# Patient Record
Sex: FEMALE | Race: BLACK OR AFRICAN AMERICAN | NOT HISPANIC OR LATINO | Employment: FULL TIME | URBAN - METROPOLITAN AREA
[De-identification: names, ages, dates, MRNs, and addresses within clinical notes are randomized per-mention and may not be internally consistent; named-entity substitution may affect disease eponyms.]

---

## 2017-01-25 ENCOUNTER — CONVERSION ENCOUNTER (OUTPATIENT)
Dept: MAMMOGRAPHY | Facility: CLINIC | Age: 62
End: 2017-01-25

## 2018-01-26 ENCOUNTER — CONVERSION ENCOUNTER (OUTPATIENT)
Dept: MAMMOGRAPHY | Facility: CLINIC | Age: 63
End: 2018-01-26

## 2018-08-22 ENCOUNTER — TRANSCRIBE ORDERS (OUTPATIENT)
Dept: ADMINISTRATIVE | Facility: HOSPITAL | Age: 63
End: 2018-08-22

## 2018-08-22 DIAGNOSIS — R06.02 SOB (SHORTNESS OF BREATH): Primary | ICD-10-CM

## 2018-08-22 DIAGNOSIS — I50.9 CONGESTIVE HEART FAILURE, UNSPECIFIED HF CHRONICITY, UNSPECIFIED HEART FAILURE TYPE (HCC): ICD-10-CM

## 2018-09-12 ENCOUNTER — APPOINTMENT (OUTPATIENT)
Dept: LAB | Facility: HOSPITAL | Age: 63
End: 2018-09-12

## 2018-09-12 ENCOUNTER — TRANSCRIBE ORDERS (OUTPATIENT)
Dept: ADMINISTRATIVE | Facility: HOSPITAL | Age: 63
End: 2018-09-12

## 2018-09-12 DIAGNOSIS — Z00.8 HEALTH EXAMINATION IN POPULATION SURVEY: Primary | ICD-10-CM

## 2018-09-12 DIAGNOSIS — Z00.8 HEALTH EXAMINATION IN POPULATION SURVEY: ICD-10-CM

## 2018-09-12 LAB
CHOLEST SERPL-MCNC: 175 MG/DL
EST. AVERAGE GLUCOSE BLD GHB EST-MCNC: 134 MG/DL
HBA1C MFR BLD: 6.3 % (ref 4.2–6.3)
HDLC SERPL-MCNC: 46 MG/DL (ref 40–59)
LDLC SERPL CALC-MCNC: 109 MG/DL
NONHDLC SERPL-MCNC: 129 MG/DL
TRIGL SERPL-MCNC: 102 MG/DL

## 2018-09-12 PROCEDURE — 83036 HEMOGLOBIN GLYCOSYLATED A1C: CPT | Performed by: PREVENTIVE MEDICINE

## 2018-09-12 PROCEDURE — 36415 COLL VENOUS BLD VENIPUNCTURE: CPT | Performed by: PREVENTIVE MEDICINE

## 2018-09-12 PROCEDURE — 80061 LIPID PANEL: CPT

## 2018-10-18 ENCOUNTER — OFFICE VISIT (OUTPATIENT)
Dept: FAMILY MEDICINE CLINIC | Facility: CLINIC | Age: 63
End: 2018-10-18
Payer: COMMERCIAL

## 2018-10-18 VITALS
OXYGEN SATURATION: 95 % | HEART RATE: 65 BPM | HEIGHT: 62 IN | TEMPERATURE: 97.3 F | WEIGHT: 222 LBS | BODY MASS INDEX: 40.85 KG/M2 | RESPIRATION RATE: 16 BRPM | DIASTOLIC BLOOD PRESSURE: 82 MMHG | SYSTOLIC BLOOD PRESSURE: 132 MMHG

## 2018-10-18 DIAGNOSIS — E78.5 HYPERLIPIDEMIA, UNSPECIFIED HYPERLIPIDEMIA TYPE: ICD-10-CM

## 2018-10-18 DIAGNOSIS — E66.01 CLASS 3 SEVERE OBESITY DUE TO EXCESS CALORIES WITH SERIOUS COMORBIDITY AND BODY MASS INDEX (BMI) OF 40.0 TO 44.9 IN ADULT (HCC): ICD-10-CM

## 2018-10-18 DIAGNOSIS — R73.03 PREDIABETES: ICD-10-CM

## 2018-10-18 DIAGNOSIS — I10 HYPERTENSION, UNSPECIFIED TYPE: ICD-10-CM

## 2018-10-18 DIAGNOSIS — J02.8 SORE THROAT (VIRAL): Primary | ICD-10-CM

## 2018-10-18 DIAGNOSIS — Z87.898 HISTORY OF PREDIABETES: ICD-10-CM

## 2018-10-18 DIAGNOSIS — B97.89 SORE THROAT (VIRAL): Primary | ICD-10-CM

## 2018-10-18 PROBLEM — E11.9 TYPE 2 DIABETES MELLITUS WITHOUT COMPLICATION, WITHOUT LONG-TERM CURRENT USE OF INSULIN (HCC): Status: ACTIVE | Noted: 2018-10-18

## 2018-10-18 PROCEDURE — 99204 OFFICE O/P NEW MOD 45 MIN: CPT | Performed by: NURSE PRACTITIONER

## 2018-10-18 RX ORDER — HYDROCHLOROTHIAZIDE 25 MG/1
TABLET ORAL
COMMUNITY
Start: 2017-05-04 | End: 2019-03-20 | Stop reason: SDUPTHER

## 2018-10-18 RX ORDER — OLMESARTAN MEDOXOMIL 40 MG/1
TABLET ORAL
COMMUNITY
Start: 2018-09-12 | End: 2019-03-20 | Stop reason: SDUPTHER

## 2018-10-18 RX ORDER — PANTOPRAZOLE SODIUM 40 MG/1
TABLET, DELAYED RELEASE ORAL
COMMUNITY
Start: 2018-07-17 | End: 2019-03-22 | Stop reason: SDUPTHER

## 2018-10-18 RX ORDER — ASPIRIN 81 MG
TABLET, DELAYED RELEASE (ENTERIC COATED) ORAL
COMMUNITY
Start: 2018-09-12 | End: 2019-06-20 | Stop reason: ALTCHOICE

## 2018-10-18 RX ORDER — HYDROCHLOROTHIAZIDE 25 MG/1
TABLET ORAL
COMMUNITY
Start: 2018-09-12 | End: 2018-10-18 | Stop reason: SDUPTHER

## 2018-10-18 RX ORDER — LEVOTHYROXINE SODIUM 0.05 MG/1
TABLET ORAL EVERY 24 HOURS
COMMUNITY
Start: 2018-05-16 | End: 2018-11-15 | Stop reason: ALTCHOICE

## 2018-10-18 RX ORDER — LANCETS
EACH MISCELLANEOUS
Qty: 100 EACH | Refills: 3 | Status: SHIPPED | OUTPATIENT
Start: 2018-10-18 | End: 2020-05-06 | Stop reason: SDUPTHER

## 2018-10-18 NOTE — ASSESSMENT & PLAN NOTE
Lab Results   Component Value Date    HGBA1C 6 3 09/12/2018       No results for input(s): POCGLU in the last 72 hours  Blood Sugar Average: Last 72 hrs:      I am ordering hgb A1c for pt to check A1c level  Does not take metformin because it makes her sick, has been off of it for a few months now  Will discuss this with Dr Soo Vick

## 2018-10-18 NOTE — PROGRESS NOTES
Assessment/Plan:    Sore throat (viral)  Recommended to continue with conservative measures as well as strict handwashing  Ibuprofen or acetaminophen is recommended for analgesia  Saltwater gargle, warm liquids, and rest may be helpful in relieving symptoms  At this time she does not require antibiotics unless new symptoms arise or worsen  Hypertension  Pt today controlled with hctz and benicar  I am not making any changes  Advised patient to continue on current medication regimen  Explained to patient that therapeutic measure is lifelong lifestyle modification including:  Sodium reduction (<2 g/day)  Dietary Approaches to Stop Hypertension (DASH) diet (3 servings of fruit and vegetables daily, whole grains, low sodium, low-fat proteins)   Weight loss to BMI under 30 kg/m^2  Increased physical activity: 3 to 5 times/week of daily aerobic exercise for 30- to 50-minute sessions as tolerated   Limited alcohol consumption less than 5 drinks for week  Prediabetes  Lab Results   Component Value Date    HGBA1C 6 3 09/12/2018       No results for input(s): POCGLU in the last 72 hours  Blood Sugar Average: Last 72 hrs:      I am ordering hgb A1c for pt to check A1c level  Does not take metformin because it makes her sick, has been off of it for a few months now  Will discuss this with Dr Peng Tavera  Diagnoses and all orders for this visit:    Sore throat (viral)    Hyperlipidemia, unspecified hyperlipidemia type  -     Lipid panel; Future  -     TSH, 3rd generation with Free T4 reflex; Future    Prediabetes    Hypertension, unspecified type    History of prediabetes  -     Comprehensive metabolic panel; Future  -     Hemoglobin A1C; Future  -     glucose blood test strip; Checks blood glucose 3 times a day  -     Lancets (ONETOUCH ULTRASOFT) lancets;  Check blood glucose 3 times a day    Class 3 severe obesity due to excess calories with serious comorbidity and body mass index (BMI) of 40 0 to 44 9 in adult ([facility] )  -     CBC and differential; Future    Other orders  -     VENTOLIN  (90 Base) MCG/ACT inhaler;   -     ASPIRIN LOW DOSE 81 MG EC tablet;   -     hydrochlorothiazide (HYDRODIURIL) 25 mg tablet; take 1 tablet by oral route  every morning  -     Discontinue: hydrochlorothiazide (HYDRODIURIL) 25 mg tablet;   -     hydrocortisone 2 5 % cream;   -     pantoprazole (PROTONIX) 40 mg tablet;   -     levothyroxine (SYNTHROID) 50 mcg tablet; every 24 hours  -     olmesartan (BENICAR) 40 mg tablet;           Subjective:      Patient ID: Pranav Phan is a 59 y o  female  Cc  Per pt  Sore throat since yesterday and need refills her medication  59year old female patient here to establish care  Was under care of Sergio and now starting here  Last bloodwork was this year in the beginning  Pt states she is prediabetic and has hypothyroid  Jan of 2018 had a physical  Mammogram done  Needs to go for colonscopy in 2020, due to a polyp every 5 years  Patient has seasonal allergies  Feels sore throat started 1 day before yesterday  Pain scale 3/10  Denies fever  Has been using salt water gargles and lozenges and she feels better  Some nasal congestion  Does not use flonase as she does not like it  Mild cough last night but she feels good  Using inhaler for her wheeze  The following portions of the patient's history were reviewed and updated as appropriate: allergies, current medications, past family history, past medical history, past social history, past surgical history and problem list     Review of Systems   Constitutional: Negative  HENT: Positive for ear pain and sore throat  Negative for sinus pain  Eyes: Negative  Respiratory: Negative  Negative for shortness of breath  Cardiovascular: Negative  Negative for chest pain, palpitations and leg swelling  Gastrointestinal: Negative  Endocrine: Negative  Negative for polydipsia and polyuria  Genitourinary: Negative  Musculoskeletal: Negative  Skin: Negative  Psychiatric/Behavioral: Negative  Objective:      /82 (BP Location: Left arm, Patient Position: Sitting, Cuff Size: Adult)   Pulse 65   Temp (!) 97 3 °F (36 3 °C) (Oral)   Resp 16   Ht 5' 2" (1 575 m)   Wt 101 kg (222 lb)   SpO2 95%   BMI 40 60 kg/m²          Physical Exam   Constitutional: She is oriented to person, place, and time  She appears well-developed and well-nourished  HENT:   Head: Normocephalic and atraumatic  Right Ear: External ear and ear canal normal  No tenderness  A middle ear effusion is present  Left Ear: External ear and ear canal normal  No tenderness  A middle ear effusion is present  Nose: Rhinorrhea present  Right sinus exhibits no maxillary sinus tenderness and no frontal sinus tenderness  Left sinus exhibits no maxillary sinus tenderness and no frontal sinus tenderness  Mouth/Throat: Uvula is midline, oropharynx is clear and moist and mucous membranes are normal    Cardiovascular: Normal rate, regular rhythm and normal heart sounds  Pulmonary/Chest: Effort normal and breath sounds normal    Musculoskeletal: Normal range of motion  Neurological: She is alert and oriented to person, place, and time  She has normal reflexes  Skin: Skin is warm and dry  Nursing note and vitals reviewed

## 2018-10-18 NOTE — ASSESSMENT & PLAN NOTE
Recommended to continue with conservative measures as well as strict handwashing  Ibuprofen or acetaminophen is recommended for analgesia  Saltwater gargle, warm liquids, and rest may be helpful in relieving symptoms  At this time she does not require antibiotics unless new symptoms arise or worsen

## 2018-10-18 NOTE — PATIENT INSTRUCTIONS
Hypertension   WHAT YOU NEED TO KNOW:   What is hypertension? Hypertension is high blood pressure (BP)  Your BP is the force of your blood moving against the walls of your arteries  Normal BP is less than 120/80  Prehypertension is between 120/80 and 139/89  Hypertension is 140/90 or higher  Hypertension causes your BP to get so high that your heart has to work much harder than normal  This can damage your heart  You can control hypertension with a healthy lifestyle or medicines  A controlled blood pressure helps protect your organs, such as your heart, lungs, brain, and kidneys  What causes hypertension? The cause of hypertension may not be known  This type of hypertension is called essential or primary hypertension  Hypertension can sometimes be caused by other medical conditions, such as kidney disease  This type of hypertension is called secondary hypertension  What increases my risk for hypertension? · Age older than 54 years (men)    · Age older than 72 years (women)    · A family history of hypertension or heart disease     · Obesity or lack of exercise     · Too many high-sodium foods    · Stress     · Use of tobacco, alcohol, or illegal drugs     · A medical condition, such as diabetes, kidney disease, thyroid disease, or adrenal gland disorder     · Certain medicines, such as steroids or birth control pills  What are the signs and symptoms of hypertension? You may have no signs or symptoms, or you may have any of the following:  · Headache     · Blurred vision     · Chest pain     · Dizziness or weakness     · Trouble breathing    · Nosebleeds  How is hypertension diagnosed? Your healthcare provider will ask about your symptoms and the medicines you take  He or she will also ask if you have a family history of high blood pressure and about any health conditions you have  He or she will also check your blood pressure and weight and examine your heart, lungs, and eyes   You may need any of the following tests:  · Blood tests  may help healthcare providers find the cause of your hypertension  Blood tests can also help find other health problems caused by hypertension  · Urine tests  will be done to check your kidney function  Kidney problems can increase your risk for hypertension  How is hypertension treated? Your healthcare provider will recommend lifestyle changes to lower your BP  You may also need the following medicines:  · Medicine  may be used to help lower your BP  You may need more than one type of medicine  Take the medicine exactly as directed  · Diuretics  help decrease extra fluid that collects in your body  This will help lower your BP  You may urinate more often while you take this medicine  · Cholesterol medicine  helps lower your cholesterol level  A low cholesterol level helps prevent heart disease and makes it easier to control your blood pressure  What can I do to manage hypertension? Talk with your healthcare provider about these and other ways to manage hypertension:  · Check your BP at home  Sit and rest for 5 minutes before you take your BP  Extend your arm and support it on a flat surface  Your arm should be at the same level as your heart  Follow the directions that came with your BP monitor  If possible, take at least 2 BP readings each time  Take your BP at least twice a day at the same times each day, such as morning and evening  Keep a record of your BP readings and bring it to your follow-up visits  Ask your healthcare provider what your BP should be  · Limit sodium (salt) as directed  Too much sodium can affect your fluid balance  Check labels to find low-sodium or no-salt-added foods  Some low-sodium foods use potassium salts for flavor  Too much potassium can also cause health problems  Your healthcare provider will tell you how much sodium and potassium are safe for you to have in a day   He or she may recommend that you limit sodium to 2,300 mg a day            · Follow the meal plan recommended by your healthcare provider  A dietitian or your provider can give you more information on low-sodium plans or the DASH (Dietary Approaches to Stop Hypertension) eating plan  The DASH plan is low in sodium, unhealthy fats, and total fat  It is high in potassium, calcium, and fiber  · Exercise to maintain a healthy weight  Exercise at least 30 minutes per day, on most days of the week  This will help decrease your blood pressure  Ask your healthcare provider about the best exercise plan for you  · Decrease stress  This may help lower your BP  Learn ways to relax, such as deep breathing or listening to music  · Limit alcohol  Women should limit alcohol to 1 drink a day  Men should limit alcohol to 2 drinks a day  A drink of alcohol is 12 ounces of beer, 5 ounces of wine, or 1½ ounces of liquor  · Do not smoke  Nicotine and other chemicals in cigarettes and cigars can increase your BP and also cause lung damage  Ask your healthcare provider for information if you currently smoke and need help to quit  E-cigarettes or smokeless tobacco still contain nicotine  Talk to your healthcare provider before you use these products  · Manage any other health conditions you have  Health conditions such as diabetes can increase your risk for hypertension  Follow your healthcare provider's instructions and take all your medicines as directed  Call 911 for any of the following:   · You have discomfort in your chest that feels like squeezing, pressure, fullness, or pain  · You become confused or have difficulty speaking  · You suddenly feel lightheaded or have trouble breathing  · You have pain or discomfort in your back, neck, jaw, stomach, or arm  When should I seek immediate care? · You have a severe headache or vision loss  · You have weakness in an arm or leg  When should I contact my healthcare provider?    · You feel faint, dizzy, confused, or drowsy  · You have been taking your BP medicine and your BP is still higher than your healthcare provider says it should be  · You have questions or concerns about your condition or care  CARE AGREEMENT:   You have the right to help plan your care  Learn about your health condition and how it may be treated  Discuss treatment options with your caregivers to decide what care you want to receive  You always have the right to refuse treatment  The above information is an  only  It is not intended as medical advice for individual conditions or treatments  Talk to your doctor, nurse or pharmacist before following any medical regimen to see if it is safe and effective for you  © 2017 [employer]  Information is for End User's use only and may not be sold, redistributed or otherwise used for commercial purposes  All illustrations and images included in CareNotes® are the copyrighted property of [employer]  or [employer]  Pharyngelmira   WHAT YOU NEED TO KNOW:   Pharyngitis, or sore throat, is inflammation of the tissues and structures in your pharynx (throat)  Pharyngitis is most often caused by bacteria  It may also be caused by a cold or flu virus  Other causes include smoking, allergies, or acid reflux  DISCHARGE INSTRUCTIONS:   Call 911 for any of the following:   · You have trouble breathing or swallowing because your throat is swollen or sore  Return to the emergency department if:   · You are drooling because it hurts too much to swallow  · Your fever is higher than 102? F (39?C) or lasts longer than 3 days  · You are confused  · You taste blood in your throat  Contact your healthcare provider if:   · Your throat pain gets worse  · You have a painful lump in your throat that does not go away after 5 days  · Your symptoms do not improve after 5 days  · You have questions or concerns about your condition or care    Medicines: Viral pharyngitis will go away on its own without treatment  Your sore throat should start to feel better in 3 to 5 days for both viral and bacterial infections  You may need any of the following:  · Antibiotics  treat a bacterial infection  · NSAIDs , such as ibuprofen, help decrease swelling, pain, and fever  NSAIDs can cause stomach bleeding or kidney problems in certain people  If you take blood thinner medicine, always ask your healthcare provider if NSAIDs are safe for you  Always read the medicine label and follow directions  · Acetaminophen  decreases pain and fever  It is available without a doctor's order  Ask how much to take and how often to take it  Follow directions  Acetaminophen can cause liver damage if not taken correctly  · Take your medicine as directed  Contact your healthcare provider if you think your medicine is not helping or if you have side effects  Tell him or her if you are allergic to any medicine  Keep a list of the medicines, vitamins, and herbs you take  Include the amounts, and when and why you take them  Bring the list or the pill bottles to follow-up visits  Carry your medicine list with you in case of an emergency  Manage your symptoms:   · Gargle salt water  Mix ¼ teaspoon salt in an 8 ounce glass of warm water and gargle  This may help decrease swelling in your throat  · Drink liquids as directed  You may need to drink more liquids than usual  Liquids may help soothe your throat and prevent dehydration  Ask how much liquid to drink each day and which liquids are best for you  · Use a cool-steam humidifier  to help moisten the air in your room and calm your cough  · Soothe your throat  with cough drops, ice, soft foods, or popsicles  Prevent the spread of pharyngitis:  Cover your mouth and nose when you cough or sneeze  Do not share food or drinks  Wash your hands often  Use soap and water   If soap and water are unavailable, use an alcohol based hand   Follow up with your healthcare provider as directed:  Write down your questions so you remember to ask them during your visits  © 2017 [employer] Information is for End User's use only and may not be sold, redistributed or otherwise used for commercial purposes  All illustrations and images included in CareNotes® are the copyrighted property of [employer]  or [employer]  The above information is an  only  It is not intended as medical advice for individual conditions or treatments  Talk to your doctor, nurse or pharmacist before following any medical regimen to see if it is safe and effective for you

## 2018-10-18 NOTE — ASSESSMENT & PLAN NOTE
Pt today controlled with hctz and benicar  I am not making any changes  Advised patient to continue on current medication regimen  Explained to patient that therapeutic measure is lifelong lifestyle modification including:  Sodium reduction (<2 g/day)  Dietary Approaches to Stop Hypertension (DASH) diet (3 servings of fruit and vegetables daily, whole grains, low sodium, low-fat proteins)   Weight loss to BMI under 30 kg/m^2  Increased physical activity: 3 to 5 times/week of daily aerobic exercise for 30- to 50-minute sessions as tolerated   Limited alcohol consumption less than 5 drinks for week

## 2018-11-15 ENCOUNTER — OFFICE VISIT (OUTPATIENT)
Dept: FAMILY MEDICINE CLINIC | Facility: CLINIC | Age: 63
End: 2018-11-15
Payer: COMMERCIAL

## 2018-11-15 VITALS
HEIGHT: 62 IN | SYSTOLIC BLOOD PRESSURE: 122 MMHG | TEMPERATURE: 98.7 F | WEIGHT: 223 LBS | HEART RATE: 58 BPM | RESPIRATION RATE: 16 BRPM | BODY MASS INDEX: 41.04 KG/M2 | DIASTOLIC BLOOD PRESSURE: 80 MMHG | OXYGEN SATURATION: 95 %

## 2018-11-15 DIAGNOSIS — I10 HYPERTENSION, UNSPECIFIED TYPE: ICD-10-CM

## 2018-11-15 DIAGNOSIS — E66.01 CLASS 3 SEVERE OBESITY DUE TO EXCESS CALORIES WITH SERIOUS COMORBIDITY AND BODY MASS INDEX (BMI) OF 40.0 TO 44.9 IN ADULT (HCC): ICD-10-CM

## 2018-11-15 DIAGNOSIS — Z00.01 ENCOUNTER FOR WELL ADULT EXAM WITH ABNORMAL FINDINGS: Primary | ICD-10-CM

## 2018-11-15 DIAGNOSIS — J45.909 MILD ASTHMA WITHOUT COMPLICATION, UNSPECIFIED WHETHER PERSISTENT: ICD-10-CM

## 2018-11-15 PROBLEM — J02.8 SORE THROAT (VIRAL): Status: RESOLVED | Noted: 2018-10-18 | Resolved: 2018-11-15

## 2018-11-15 PROBLEM — B97.89 SORE THROAT (VIRAL): Status: RESOLVED | Noted: 2018-10-18 | Resolved: 2018-11-15

## 2018-11-15 PROBLEM — E66.813 CLASS 3 SEVERE OBESITY DUE TO EXCESS CALORIES WITH SERIOUS COMORBIDITY AND BODY MASS INDEX (BMI) OF 40.0 TO 44.9 IN ADULT (HCC): Status: ACTIVE | Noted: 2018-11-15

## 2018-11-15 PROCEDURE — 99396 PREV VISIT EST AGE 40-64: CPT | Performed by: NURSE PRACTITIONER

## 2018-11-15 RX ORDER — ALBUTEROL SULFATE 2.5 MG/3ML
SOLUTION RESPIRATORY (INHALATION)
COMMUNITY
Start: 2018-11-08 | End: 2019-03-22 | Stop reason: SDUPTHER

## 2018-11-15 RX ORDER — LEVOTHYROXINE SODIUM 0.1 MG/1
TABLET ORAL
COMMUNITY
Start: 2018-11-08 | End: 2019-03-20 | Stop reason: SDUPTHER

## 2018-11-15 NOTE — ASSESSMENT & PLAN NOTE
To continue on current medication HCTZ 25mg and Benicar  I am not adjusting the medication today  I advised patient to complete her bloodwork and return to clinic in 6 months for BP follow up  To continue on low sodium diet and exercise

## 2018-11-15 NOTE — PROGRESS NOTES
Assessment/Plan:    Hypertension  To continue on current medication HCTZ 25mg and Benicar  I am not adjusting the medication today  I advised patient to complete her bloodwork and return to clinic in 6 months for BP follow up  To continue on low sodium diet and exercise  Encounter for well adult exam with abnormal findings  Patient is here for physical exam we find this visit without any distress or abnormalities  We recommended exercise at least three and 0 5 hours per week and healthy diet with a low carbohydrate and low saturated fat  Began to follow up in one year for regular physical exams  Class 3 severe obesity due to excess calories with serious comorbidity and body mass index (BMI) of 40 0 to 44 9 in adult [facility])  I counseled patient about healthy BMI with benefit of avoiding weight gain to prevent health risks  I recommended increased intake of fruits, vegetables, yogurt, whole grains, and diet soda  Also advised patient on increased physical activity and sleeping 6-8 hours/night  Limiting trans fat, saturated fat, fried foods, sugar and adding low fat foods in her diet  Diagnoses and all orders for this visit:    Encounter for well adult exam with abnormal findings    Hypertension, unspecified type    Class 3 severe obesity due to excess calories with serious comorbidity and body mass index (BMI) of 40 0 to 44 9 in adult ([address] )    Mild asthma without complication, unspecified whether persistent          Subjective:      Patient ID: Tyler Ro is a 59 y o  female  Follow up  59year old female patient here for physical  States she is not due for mammogram 2019 in January  Not due to colonscopy  Feels well overall          The following portions of the patient's history were reviewed and updated as appropriate: allergies, current medications, past family history, past medical history, past social history, past surgical history and problem list     Review of Systems   Constitutional: Negative  Negative for fatigue  Mild obesity   HENT: Negative  Eyes: Negative  Respiratory: Negative  Negative for shortness of breath  Cardiovascular: Negative  Negative for chest pain, palpitations and leg swelling  Gastrointestinal: Negative  Negative for abdominal distention  Endocrine: Negative  Negative for cold intolerance, polydipsia and polyuria  Genitourinary: Negative  Negative for difficulty urinating  Musculoskeletal: Negative  Skin: Negative  Allergic/Immunologic: Negative  Neurological: Negative  Negative for headaches  Hematological: Negative  Psychiatric/Behavioral: Negative  Objective:      /80 (BP Location: Left arm, Patient Position: Sitting, Cuff Size: Adult)   Pulse 58   Temp 98 7 °F (37 1 °C) (Oral)   Resp 16   Ht 5' 2" (1 575 m)   Wt 101 kg (223 lb)   SpO2 95%   BMI 40 79 kg/m²          Physical Exam   Constitutional: She is oriented to person, place, and time  She appears well-developed and well-nourished  HENT:   Head: Normocephalic and atraumatic  Eyes: Pupils are equal, round, and reactive to light  Conjunctivae and EOM are normal    Neck: Normal range of motion  Neck supple  Cardiovascular: Normal rate, regular rhythm, normal heart sounds and intact distal pulses  Exam reveals no gallop and no friction rub  No murmur heard  Pulmonary/Chest: Effort normal and breath sounds normal  No respiratory distress  Abdominal: Soft  Bowel sounds are normal    Musculoskeletal: Normal range of motion  Neurological: She is alert and oriented to person, place, and time  She has normal reflexes  Skin: Skin is warm and dry  Psychiatric: She has a normal mood and affect  Thought content normal    Nursing note and vitals reviewed

## 2018-11-15 NOTE — ASSESSMENT & PLAN NOTE
Patient is here for physical exam we find this visit without any distress or abnormalities  We recommended exercise at least three and 0 5 hours per week and healthy diet with a low carbohydrate and low saturated fat  Began to follow up in one year for regular physical exams

## 2018-11-15 NOTE — PATIENT INSTRUCTIONS
Heart Healthy Diet   AMBULATORY CARE:   A heart healthy diet  is an eating plan low in total fat, unhealthy fats, and sodium (salt)  A heart healthy diet helps decrease your risk for heart disease and stroke  Limit the amount of fat you eat to 25% to 35% of your total daily calories  Limit sodium to less than 2,300 mg each day  Healthy fats:  Healthy fats can help improve cholesterol levels  The risk for heart disease is decreased when cholesterol levels are normal  Choose healthy fats, such as the following:  · Unsaturated fat  is found in foods such as soybean, canola, olive, corn, and safflower oils  It is also found in soft tub margarine that is made with liquid vegetable oil  · Omega-3 fat  is found in certain fish, such as salmon, tuna, and trout, and in walnuts and flaxseed  Unhealthy fats:  Unhealthy fats can cause unhealthy cholesterol levels in your blood and increase your risk of heart disease  Limit unhealthy fats, such as the following:  · Cholesterol  is found in animal foods, such as eggs and lobster, and in dairy products made from whole milk  Limit cholesterol to less than 300 milligrams (mg) each day  You may need to limit cholesterol to 200 mg each day if you have heart disease  · Saturated fat  is found in meats, such as bruce and hamburger  It is also found in chicken or turkey skin, whole milk, and butter  Limit saturated fat to less than 7% of your total daily calories  Limit saturated fat to less than 6% if you have heart disease or are at increased risk for it  · Trans fat  is found in packaged foods, such as potato chips and cookies  It is also in hard margarine, some fried foods, and shortening  Avoid trans fats as much as possible    Heart healthy foods and drinks to include:  Ask your dietitian or healthcare provider how many servings to have from each of the following food groups:  · Grains:      ¨ Whole-wheat breads, cereals, and pastas, and brown rice    ¨ Low-fat, low-sodium crackers and chips    · Vegetables:      ¨ Broccoli, green beans, green peas, and spinach    ¨ Collards, kale, and lima beans    ¨ Carrots, sweet potatoes, tomatoes, and peppers    ¨ Canned vegetables with no salt added    · Fruits:      ¨ Bananas, peaches, pears, and pineapple    ¨ Grapes, raisins, and dates    ¨ Oranges, tangerines, grapefruit, orange juice, and grapefruit juice    ¨ Apricots, mangoes, melons, and papaya    ¨ Raspberries and strawberries    ¨ Canned fruit with no added sugar    · Low-fat dairy products:      ¨ Nonfat (skim) milk, 1% milk, and low-fat almond, cashew, or soy milks fortified with calcium    ¨ Low-fat cheese, regular or frozen yogurt, and cottage cheese    · Meats and proteins , such as lean cuts of beef and pork (loin, leg, round), skinless chicken and turkey, legumes, soy products, egg whites, and nuts  Foods and drinks to limit or avoid:  Ask your dietitian or healthcare provider about these and other foods that are high in unhealthy fat, sodium, and sugar:  · Snack or packaged foods , such as frozen dinners, cookies, macaroni and cheese, and cereals with more than 300 mg of sodium per serving    · Canned or dry mixes  for cakes, soups, sauces, or gravies    · Vegetables with added sodium , such as instant potatoes, vegetables with added sauces, or regular canned vegetables    · Other foods high in sodium , such as ketchup, barbecue sauce, salad dressing, pickles, olives, soy sauce, and miso    · High-fat dairy foods  such as whole or 2% milk, cream cheese, or sour cream, and cheeses     · High-fat protein foods  such as high-fat cuts of beef (T-bone steaks, ribs), chicken or turkey with skin, and organ meats, such as liver    · Cured or smoked meats , such as hot dogs, bruce, and sausage    · Unhealthy fats and oils , such as butter, stick margarine, shortening, and cooking oils such as coconut or palm oil    · Food and drinks high in sugar , such as soft drinks (soda), sports drinks, sweetened tea, candy, cake, cookies, pies, and doughnuts  Other diet guidelines to follow:   · Eat more foods containing omega-3 fats  Eat fish high in omega-3 fats at least 2 times a week  · Limit alcohol  Too much alcohol can damage your heart and raise your blood pressure  Women should limit alcohol to 1 drink a day  Men should limit alcohol to 2 drinks a day  A drink of alcohol is 12 ounces of beer, 5 ounces of wine, or 1½ ounces of liquor  · Choose low-sodium foods  High-sodium foods can lead to high blood pressure  Add little or no salt to food you prepare  Use herbs and spices in place of salt  · Eat more fiber  to help lower cholesterol levels  Eat at least 5 servings of fruits and vegetables each day  Eat 3 ounces of whole-grain foods each day  Legumes (beans) are also a good source of fiber  Lifestyle guidelines:   · Do not smoke  Nicotine and other chemicals in cigarettes and cigars can cause lung and heart damage  Ask your healthcare provider for information if you currently smoke and need help to quit  E-cigarettes or smokeless tobacco still contain nicotine  Talk to your healthcare provider before you use these products  · Exercise regularly  to help you maintain a healthy weight and improve your blood pressure and cholesterol levels  Ask your healthcare provider about the best exercise plan for you  Do not start an exercise program without asking your healthcare provider  Follow up with your healthcare provider as directed:  Write down your questions so you remember to ask them during your visits  © 2017 [employer] Information is for End User's use only and may not be sold, redistributed or otherwise used for commercial purposes  All illustrations and images included in CareNotes® are the copyrighted property of [employer]  or [employer]  The above information is an  only   It is not intended as medical advice for individual conditions or treatments  Talk to your doctor, nurse or pharmacist before following any medical regimen to see if it is safe and effective for you  Wellness Visit for Adults   WHAT YOU NEED TO KNOW:   What is a wellness visit? A wellness visit is when you see your healthcare provider to get screened for health problems  You can also get advice on how to stay healthy  Write down your questions so you remember to ask them  Ask your healthcare provider how often you should have a wellness visit  What happens at a wellness visit? Your healthcare provider will ask about your health, and your family history of health problems  This includes high blood pressure, heart disease, and cancer  He or she will ask if you have symptoms that concern you, if you smoke, and about your mood  You may also be asked about your intake of medicines, supplements, food, and alcohol  Any of the following may be done:  · Your weight  will be checked  Your height may also be checked so your body mass index (BMI) can be calculated  Your BMI shows if you are at a healthy weight  · Your blood pressure  and heart rate will be checked  Your temperature may also be checked  · Blood and urine tests  may be done  Blood tests may be done to check your cholesterol levels  Abnormal cholesterol levels increase your risk for heart disease and stroke  You may also need a blood or urine test to check for diabetes if you are at increased risk  Urine tests may be done to look for signs of an infection or kidney disease  · A physical exam  includes checking your heartbeat and lungs with a stethoscope  Your healthcare provider may also check your skin to look for sun damage  · Screening tests  may be recommended  A screening test is done to check for diseases that may not cause symptoms  The screening tests you may need depend on your age, gender, family history, and lifestyle habits   For example, colorectal screening may be recommended if you are 48years old or older  What screening tests do I need if I am a woman? · A Pap smear  is used to screen for cervical cancer  Pap smears are usually done every 3 to 5 years depending on your age  You may need them more often if you have had abnormal Pap smear test results in the past  Ask your healthcare provider how often you should have a Pap smear  · A mammogram  is an x-ray of your breasts to screen for breast cancer  Experts recommend mammograms every 2 years starting at age 48 years  You may need a mammogram at age 52 years or younger if you have an increased risk for breast cancer  Talk to your healthcare provider about when you should start having mammograms and how often you need them  What vaccines might I need? · Get an influenza vaccine  every year  The influenza vaccine protects you from the flu  Several types of viruses cause the flu  The viruses change over time, so new vaccines are made each year  · Get a tetanus-diphtheria (Td) booster vaccine  every 10 years  This vaccine protects you against tetanus and diphtheria  Tetanus is a severe infection that may cause painful muscle spasms and lockjaw  Diphtheria is a severe bacterial infection that causes a thick covering in the back of your mouth and throat  · Get a human papillomavirus (HPV) vaccine  if you are female and aged 23 to 32 or male 23 to 24 and never received it  This vaccine protects you from HPV infection  HPV is the most common infection spread by sexual contact  HPV may also cause vaginal, penile, and anal cancers  · Get a pneumococcal vaccine  if you are aged 72 years or older  The pneumococcal vaccine is an injection given to protect you from pneumococcal disease  Pneumococcal disease is an infection caused by pneumococcal bacteria  The infection may cause pneumonia, meningitis, or an ear infection  · Get a shingles vaccine  if you are aged 61 or older, even if you have had shingles before  The shingles vaccine is an injection to protect you from the varicella-zoster virus  This is the same virus that causes chickenpox  Shingles is a painful rash that develops in people who had chickenpox or have been exposed to the virus  How can I eat healthy? My Plate is a model for planning healthy meals  It shows the types and amounts of foods that should go on your plate  Fruits and vegetables make up about half of your plate, and grains and protein make up the other half  A serving of dairy is included on the side of your plate  The amount of calories and serving sizes you need depends on your age, gender, weight, and height  Examples of healthy foods are listed below:  · Eat a variety of vegetables  such as dark green, red, and orange vegetables  You can also include canned vegetables low in sodium (salt) and frozen vegetables without added butter or sauces  · Eat a variety of fresh fruits , canned fruit in 100% juice, frozen fruit, and dried fruit  · Include whole grains  At least half of the grains you eat should be whole grains  Examples include whole-wheat bread, wheat pasta, brown rice, and whole-grain cereals such as oatmeal     · Eat a variety of protein foods such as seafood (fish and shellfish), lean meat, and poultry without skin (turkey and chicken)  Examples of lean meats include pork leg, shoulder, or tenderloin, and beef round, sirloin, tenderloin, and extra lean ground beef  Other protein foods include eggs and egg substitutes, beans, peas, soy products, nuts, and seeds  · Choose low-fat dairy products such as skim or 1% milk or low-fat yogurt, cheese, and cottage cheese  · Limit unhealthy fats  such as butter, hard margarine, and shortening  How much exercise do I need? Exercise at least 30 minutes per day on most days of the week  Some examples of exercise include walking, biking, dancing, and swimming   You can also fit in more physical activity by taking the stairs instead of the elevator or parking farther away from stores  Include muscle strengthening activities 2 days each week  Regular exercise provides many health benefits  It helps you manage your weight, and decreases your risk for type 2 diabetes, heart disease, stroke, and high blood pressure  Exercise can also help improve your mood  Ask your healthcare provider about the best exercise plan for you  What are some general health and safety guidelines I should follow? · Do not smoke  Nicotine and other chemicals in cigarettes and cigars can cause lung damage  Ask your healthcare provider for information if you currently smoke and need help to quit  E-cigarettes or smokeless tobacco still contain nicotine  Talk to your healthcare provider before you use these products  · Limit alcohol  A drink of alcohol is 12 ounces of beer, 5 ounces of wine, or 1½ ounces of liquor  · Lose weight, if needed  Being overweight increases your risk of certain health conditions  These include heart disease, high blood pressure, type 2 diabetes, and certain types of cancer  · Protect your skin  Do not sunbathe or use tanning beds  Use sunscreen with a SPF 15 or higher  Apply sunscreen at least 15 minutes before you go outside  Reapply sunscreen every 2 hours  Wear protective clothing, hats, and sunglasses when you are outside  · Drive safely  Always wear your seatbelt  Make sure everyone in your car wears a seatbelt  A seatbelt can save your life if you are in an accident  Do not use your cell phone when you are driving  This could distract you and cause an accident  Pull over if you need to make a call or send a text message  · Practice safe sex  Use latex condoms if are sexually active and have more than one partner  Your healthcare provider may recommend screening tests for sexually transmitted infections (STIs)  · Wear helmets, lifejackets, and protective gear    Always wear a helmet when you ride a bike or motorcycle, go skiing, or play sports that could cause a head injury  Wear protective equipment when you play sports  Wear a lifejacket when you are on a boat or doing water sports  CARE AGREEMENT:   You have the right to help plan your care  Learn about your health condition and how it may be treated  Discuss treatment options with your caregivers to decide what care you want to receive  You always have the right to refuse treatment  The above information is an  only  It is not intended as medical advice for individual conditions or treatments  Talk to your doctor, nurse or pharmacist before following any medical regimen to see if it is safe and effective for you  © 2017 [facility] Information is for End User's use only and may not be sold, redistributed or otherwise used for commercial purposes  All illustrations and images included in CareNotes® are the copyrighted property of [employer]  or [employer]

## 2018-12-19 DIAGNOSIS — Z87.898 HISTORY OF PREDIABETES: ICD-10-CM

## 2018-12-19 DIAGNOSIS — I10 HYPERTENSION, UNSPECIFIED TYPE: Primary | ICD-10-CM

## 2018-12-19 RX ORDER — OLMESARTAN MEDOXOMIL 40 MG/1
40 TABLET ORAL DAILY
Qty: 90 TABLET | Refills: 1 | Status: SHIPPED | OUTPATIENT
Start: 2018-12-19 | End: 2019-03-24 | Stop reason: SDUPTHER

## 2018-12-19 RX ORDER — OLMESARTAN MEDOXOMIL 40 MG/1
40 TABLET ORAL DAILY
COMMUNITY
Start: 2017-05-04 | End: 2018-12-19 | Stop reason: SDUPTHER

## 2018-12-19 RX ORDER — HYDROCHLOROTHIAZIDE 25 MG/1
TABLET ORAL
COMMUNITY
Start: 2017-05-04 | End: 2018-12-19 | Stop reason: SDUPTHER

## 2018-12-19 RX ORDER — HYDROCHLOROTHIAZIDE 25 MG/1
25 TABLET ORAL EVERY MORNING
Qty: 90 TABLET | Refills: 1 | Status: SHIPPED | OUTPATIENT
Start: 2018-12-19 | End: 2019-03-22 | Stop reason: SDUPTHER

## 2018-12-19 RX ORDER — LEVOTHYROXINE SODIUM 0.05 MG/1
TABLET ORAL EVERY 24 HOURS
COMMUNITY
Start: 2018-05-16 | End: 2019-03-20 | Stop reason: SDUPTHER

## 2019-01-10 DIAGNOSIS — Z12.31 ENCOUNTER FOR SCREENING MAMMOGRAM FOR MALIGNANT NEOPLASM OF BREAST: Primary | ICD-10-CM

## 2019-01-28 ENCOUNTER — HOSPITAL ENCOUNTER (OUTPATIENT)
Dept: MAMMOGRAPHY | Facility: CLINIC | Age: 64
Discharge: HOME | End: 2019-01-28
Payer: COMMERCIAL

## 2019-01-28 ENCOUNTER — APPOINTMENT (OUTPATIENT)
Dept: LAB | Facility: HOSPITAL | Age: 64
End: 2019-01-28
Payer: COMMERCIAL

## 2019-01-28 VITALS — WEIGHT: 223 LBS | HEIGHT: 62 IN | BODY MASS INDEX: 41.04 KG/M2

## 2019-01-28 DIAGNOSIS — Z87.898 HISTORY OF PREDIABETES: ICD-10-CM

## 2019-01-28 DIAGNOSIS — E66.01 CLASS 3 SEVERE OBESITY DUE TO EXCESS CALORIES WITH SERIOUS COMORBIDITY AND BODY MASS INDEX (BMI) OF 40.0 TO 44.9 IN ADULT (HCC): ICD-10-CM

## 2019-01-28 DIAGNOSIS — Z12.31 ENCOUNTER FOR SCREENING MAMMOGRAM FOR MALIGNANT NEOPLASM OF BREAST: ICD-10-CM

## 2019-01-28 DIAGNOSIS — E78.5 HYPERLIPIDEMIA, UNSPECIFIED HYPERLIPIDEMIA TYPE: ICD-10-CM

## 2019-01-28 LAB
ALBUMIN SERPL BCP-MCNC: 3.8 G/DL (ref 3–5.2)
ALP SERPL-CCNC: 68 U/L (ref 43–122)
ALT SERPL W P-5'-P-CCNC: 23 U/L (ref 9–52)
ANION GAP SERPL CALCULATED.3IONS-SCNC: 5 MMOL/L (ref 5–14)
AST SERPL W P-5'-P-CCNC: 33 U/L (ref 14–36)
BASOPHILS # BLD AUTO: 0.1 THOUSANDS/ΜL (ref 0–0.1)
BASOPHILS NFR BLD AUTO: 1 % (ref 0–1)
BILIRUB SERPL-MCNC: 0.7 MG/DL
BUN SERPL-MCNC: 17 MG/DL (ref 5–25)
CALCIUM SERPL-MCNC: 9 MG/DL (ref 8.4–10.2)
CHLORIDE SERPL-SCNC: 105 MMOL/L (ref 97–108)
CHOLEST SERPL-MCNC: 177 MG/DL
CO2 SERPL-SCNC: 31 MMOL/L (ref 22–30)
CREAT SERPL-MCNC: 0.64 MG/DL (ref 0.6–1.2)
EOSINOPHIL # BLD AUTO: 0.2 THOUSAND/ΜL (ref 0–0.4)
EOSINOPHIL NFR BLD AUTO: 3 % (ref 0–6)
ERYTHROCYTE [DISTWIDTH] IN BLOOD BY AUTOMATED COUNT: 14.4 %
EST. AVERAGE GLUCOSE BLD GHB EST-MCNC: 131 MG/DL
GFR SERPL CREATININE-BSD FRML MDRD: 110 ML/MIN/1.73SQ M
GLUCOSE P FAST SERPL-MCNC: 119 MG/DL (ref 70–99)
HBA1C MFR BLD: 6.2 % (ref 4.2–6.3)
HCT VFR BLD AUTO: 43.8 % (ref 36–46)
HDLC SERPL-MCNC: 38 MG/DL (ref 40–59)
HGB BLD-MCNC: 14.4 G/DL (ref 12–16)
LDLC SERPL CALC-MCNC: 115 MG/DL
LYMPHOCYTES # BLD AUTO: 3.4 THOUSANDS/ΜL (ref 0.5–4)
LYMPHOCYTES NFR BLD AUTO: 42 % (ref 25–45)
MCH RBC QN AUTO: 26.7 PG (ref 26–34)
MCHC RBC AUTO-ENTMCNC: 32.9 G/DL (ref 31–36)
MCV RBC AUTO: 81 FL (ref 80–100)
MONOCYTES # BLD AUTO: 0.8 THOUSAND/ΜL (ref 0.2–0.9)
MONOCYTES NFR BLD AUTO: 10 % (ref 1–10)
NEUTROPHILS # BLD AUTO: 3.5 THOUSANDS/ΜL (ref 1.8–7.8)
NEUTS SEG NFR BLD AUTO: 44 % (ref 45–65)
NONHDLC SERPL-MCNC: 139 MG/DL
PLATELET # BLD AUTO: 262 THOUSANDS/UL (ref 150–450)
PMV BLD AUTO: 9.8 FL (ref 8.9–12.7)
POTASSIUM SERPL-SCNC: 3.5 MMOL/L (ref 3.6–5)
PROT SERPL-MCNC: 7.2 G/DL (ref 5.9–8.4)
RBC # BLD AUTO: 5.39 MILLION/UL (ref 4–5.2)
SODIUM SERPL-SCNC: 141 MMOL/L (ref 137–147)
TRIGL SERPL-MCNC: 121 MG/DL
TSH SERPL DL<=0.05 MIU/L-ACNC: 2.96 UIU/ML (ref 0.47–4.68)
WBC # BLD AUTO: 8 THOUSAND/UL (ref 4.5–11)

## 2019-01-28 PROCEDURE — 85025 COMPLETE CBC W/AUTO DIFF WBC: CPT

## 2019-01-28 PROCEDURE — 36415 COLL VENOUS BLD VENIPUNCTURE: CPT

## 2019-01-28 PROCEDURE — 84443 ASSAY THYROID STIM HORMONE: CPT

## 2019-01-28 PROCEDURE — 77067 SCR MAMMO BI INCL CAD: CPT

## 2019-01-28 PROCEDURE — 80053 COMPREHEN METABOLIC PANEL: CPT

## 2019-01-28 PROCEDURE — 80061 LIPID PANEL: CPT

## 2019-01-28 PROCEDURE — 83036 HEMOGLOBIN GLYCOSYLATED A1C: CPT

## 2019-03-20 DIAGNOSIS — E03.9 HYPOTHYROIDISM (ACQUIRED): Primary | ICD-10-CM

## 2019-03-20 RX ORDER — LEVOTHYROXINE SODIUM 0.1 MG/1
TABLET ORAL
Status: CANCELLED | OUTPATIENT
Start: 2019-03-20

## 2019-03-20 RX ORDER — PANTOPRAZOLE SODIUM 40 MG/1
40 TABLET, DELAYED RELEASE ORAL DAILY
Qty: 90 TABLET | Refills: 3 | Status: CANCELLED | OUTPATIENT
Start: 2019-03-20

## 2019-03-20 RX ORDER — LEVOTHYROXINE SODIUM 0.05 MG/1
50 TABLET ORAL DAILY
Qty: 90 TABLET | Refills: 3 | OUTPATIENT
Start: 2019-03-20

## 2019-03-20 RX ORDER — LEVOTHYROXINE SODIUM 0.1 MG/1
100 TABLET ORAL DAILY
Qty: 90 TABLET | Refills: 3 | Status: SHIPPED | OUTPATIENT
Start: 2019-03-20 | End: 2019-03-22 | Stop reason: SDUPTHER

## 2019-03-22 ENCOUNTER — TELEPHONE (OUTPATIENT)
Dept: OTHER | Facility: OTHER | Age: 64
End: 2019-03-22

## 2019-03-22 DIAGNOSIS — I10 HYPERTENSION, UNSPECIFIED TYPE: ICD-10-CM

## 2019-03-22 DIAGNOSIS — E03.9 HYPOTHYROIDISM (ACQUIRED): ICD-10-CM

## 2019-03-22 DIAGNOSIS — K29.60 OTHER GASTRITIS WITHOUT HEMORRHAGE, UNSPECIFIED CHRONICITY: Primary | ICD-10-CM

## 2019-03-22 DIAGNOSIS — J45.909 MILD ASTHMA WITHOUT COMPLICATION, UNSPECIFIED WHETHER PERSISTENT: ICD-10-CM

## 2019-03-22 DIAGNOSIS — N39.41 URGE INCONTINENCE OF URINE: ICD-10-CM

## 2019-03-22 RX ORDER — PANTOPRAZOLE SODIUM 40 MG/1
40 TABLET, DELAYED RELEASE ORAL DAILY
Qty: 30 TABLET | Refills: 5 | Status: SHIPPED | OUTPATIENT
Start: 2019-03-22 | End: 2019-11-21 | Stop reason: SDUPTHER

## 2019-03-22 RX ORDER — HYDROCHLOROTHIAZIDE 25 MG/1
25 TABLET ORAL EVERY MORNING
Qty: 90 TABLET | Refills: 1 | Status: SHIPPED | OUTPATIENT
Start: 2019-03-22 | End: 2019-05-15 | Stop reason: SDUPTHER

## 2019-03-22 RX ORDER — OXYBUTYNIN CHLORIDE 5 MG/1
5 TABLET ORAL 2 TIMES DAILY
Qty: 60 TABLET | Refills: 5 | Status: SHIPPED | OUTPATIENT
Start: 2019-03-22 | End: 2020-05-26 | Stop reason: ALTCHOICE

## 2019-03-22 RX ORDER — LEVOTHYROXINE SODIUM 0.1 MG/1
100 TABLET ORAL DAILY
Qty: 90 TABLET | Refills: 3 | Status: SHIPPED | OUTPATIENT
Start: 2019-03-22 | End: 2019-05-20 | Stop reason: SDUPTHER

## 2019-03-22 RX ORDER — ALBUTEROL SULFATE 2.5 MG/3ML
2.5 SOLUTION RESPIRATORY (INHALATION) EVERY 6 HOURS PRN
Qty: 100 VIAL | Refills: 5 | Status: SHIPPED | OUTPATIENT
Start: 2019-03-22 | End: 2020-03-10 | Stop reason: SDUPTHER

## 2019-03-22 NOTE — TELEPHONE ENCOUNTER
[phone]: Pt is calling regarding her BP medication, she stated she called on Tuesday and she is at the pharmacy now and it is not there  Called Dr Erna Infante he is pulling her chart up now and will call her back in 5 minutes  I informed the patient

## 2019-03-24 RX ORDER — OLMESARTAN MEDOXOMIL 40 MG/1
40 TABLET ORAL DAILY
Qty: 30 TABLET | Refills: 5 | Status: SHIPPED | OUTPATIENT
Start: 2019-03-24 | End: 2019-05-20 | Stop reason: SDUPTHER

## 2019-05-15 ENCOUNTER — HOSPITAL ENCOUNTER (OUTPATIENT)
Dept: NON INVASIVE DIAGNOSTICS | Facility: HOSPITAL | Age: 64
Discharge: HOME | End: 2019-05-15
Payer: COMMERCIAL

## 2019-05-15 DIAGNOSIS — I10 HYPERTENSION, UNSPECIFIED TYPE: ICD-10-CM

## 2019-05-15 DIAGNOSIS — R06.02 SOB (SHORTNESS OF BREATH): ICD-10-CM

## 2019-05-15 DIAGNOSIS — I50.9 CONGESTIVE HEART FAILURE, UNSPECIFIED HF CHRONICITY, UNSPECIFIED HEART FAILURE TYPE (HCC): ICD-10-CM

## 2019-05-15 DIAGNOSIS — L25.9 CONTACT DERMATITIS, UNSPECIFIED CONTACT DERMATITIS TYPE, UNSPECIFIED TRIGGER: Primary | ICD-10-CM

## 2019-05-15 PROCEDURE — 93306 TTE W/DOPPLER COMPLETE: CPT

## 2019-05-15 PROCEDURE — 93306 TTE W/DOPPLER COMPLETE: CPT | Performed by: INTERNAL MEDICINE

## 2019-05-15 RX ORDER — HYDROCHLOROTHIAZIDE 25 MG/1
25 TABLET ORAL EVERY MORNING
Qty: 90 TABLET | Refills: 1 | Status: SHIPPED | OUTPATIENT
Start: 2019-05-15 | End: 2019-05-20 | Stop reason: SDUPTHER

## 2019-05-16 ENCOUNTER — OFFICE VISIT (OUTPATIENT)
Dept: CARDIOLOGY CLINIC | Facility: CLINIC | Age: 64
End: 2019-05-16
Payer: COMMERCIAL

## 2019-05-16 VITALS
BODY MASS INDEX: 41.37 KG/M2 | HEIGHT: 62 IN | WEIGHT: 224.8 LBS | SYSTOLIC BLOOD PRESSURE: 120 MMHG | HEART RATE: 72 BPM | DIASTOLIC BLOOD PRESSURE: 70 MMHG

## 2019-05-16 DIAGNOSIS — I10 HYPERTENSION, UNSPECIFIED TYPE: Primary | ICD-10-CM

## 2019-05-16 PROBLEM — E78.2 MIXED HYPERLIPIDEMIA: Status: ACTIVE | Noted: 2019-05-16

## 2019-05-16 PROCEDURE — 99214 OFFICE O/P EST MOD 30 MIN: CPT | Performed by: INTERNAL MEDICINE

## 2019-05-16 PROCEDURE — 93000 ELECTROCARDIOGRAM COMPLETE: CPT | Performed by: INTERNAL MEDICINE

## 2019-05-16 RX ORDER — IBUPROFEN 400 MG/1
1 TABLET ORAL EVERY 4 HOURS
COMMUNITY
Start: 2014-10-27 | End: 2019-06-20 | Stop reason: ALTCHOICE

## 2019-05-16 RX ORDER — FEXOFENADINE HCL 180 MG/1
1 TABLET ORAL EVERY 24 HOURS
COMMUNITY
Start: 2017-05-04 | End: 2019-05-20 | Stop reason: SDUPTHER

## 2019-05-20 ENCOUNTER — OFFICE VISIT (OUTPATIENT)
Dept: FAMILY MEDICINE CLINIC | Facility: CLINIC | Age: 64
End: 2019-05-20
Payer: COMMERCIAL

## 2019-05-20 VITALS
DIASTOLIC BLOOD PRESSURE: 84 MMHG | BODY MASS INDEX: 40.82 KG/M2 | TEMPERATURE: 97.5 F | RESPIRATION RATE: 20 BRPM | WEIGHT: 221.8 LBS | OXYGEN SATURATION: 95 % | SYSTOLIC BLOOD PRESSURE: 122 MMHG | HEIGHT: 62 IN | HEART RATE: 70 BPM

## 2019-05-20 DIAGNOSIS — J30.1 SEASONAL ALLERGIC RHINITIS DUE TO POLLEN: Primary | ICD-10-CM

## 2019-05-20 DIAGNOSIS — E03.9 HYPOTHYROIDISM (ACQUIRED): ICD-10-CM

## 2019-05-20 DIAGNOSIS — L25.9 CONTACT DERMATITIS, UNSPECIFIED CONTACT DERMATITIS TYPE, UNSPECIFIED TRIGGER: ICD-10-CM

## 2019-05-20 DIAGNOSIS — R73.03 PREDIABETES: ICD-10-CM

## 2019-05-20 DIAGNOSIS — E78.1 PURE HYPERGLYCERIDEMIA: ICD-10-CM

## 2019-05-20 DIAGNOSIS — I10 HYPERTENSION, UNSPECIFIED TYPE: ICD-10-CM

## 2019-05-20 PROCEDURE — 99214 OFFICE O/P EST MOD 30 MIN: CPT | Performed by: NURSE PRACTITIONER

## 2019-05-20 RX ORDER — FEXOFENADINE HCL 180 MG/1
180 TABLET ORAL DAILY
Qty: 30 TABLET | Refills: 5 | Status: SHIPPED | OUTPATIENT
Start: 2019-05-20 | End: 2020-09-08

## 2019-05-20 RX ORDER — LEVOTHYROXINE SODIUM 0.1 MG/1
100 TABLET ORAL DAILY
Qty: 90 TABLET | Refills: 3 | Status: SHIPPED | OUTPATIENT
Start: 2019-05-20 | End: 2019-11-22 | Stop reason: ALTCHOICE

## 2019-05-20 RX ORDER — FLUTICASONE PROPIONATE 50 MCG
2 SPRAY, SUSPENSION (ML) NASAL DAILY
Qty: 16 G | Refills: 0 | Status: SHIPPED | OUTPATIENT
Start: 2019-05-20 | End: 2021-12-28 | Stop reason: SDUPTHER

## 2019-05-20 RX ORDER — OLMESARTAN MEDOXOMIL 40 MG/1
40 TABLET ORAL DAILY
Qty: 90 TABLET | Refills: 1 | Status: SHIPPED | OUTPATIENT
Start: 2019-05-20 | End: 2019-09-27 | Stop reason: SDUPTHER

## 2019-05-20 RX ORDER — HYDROCHLOROTHIAZIDE 25 MG/1
25 TABLET ORAL EVERY MORNING
Qty: 90 TABLET | Refills: 1 | Status: SHIPPED | OUTPATIENT
Start: 2019-05-20 | End: 2019-09-27 | Stop reason: SDUPTHER

## 2019-05-29 DIAGNOSIS — K03.6 ACCRETIONS ON TEETH: ICD-10-CM

## 2019-05-29 DIAGNOSIS — Z01.20 ENCOUNTER FOR DENTAL EXAMINATION: ICD-10-CM

## 2019-06-19 ENCOUNTER — APPOINTMENT (OUTPATIENT)
Dept: LAB | Facility: HOSPITAL | Age: 64
End: 2019-06-19
Payer: COMMERCIAL

## 2019-06-19 DIAGNOSIS — I10 HYPERTENSION, UNSPECIFIED TYPE: ICD-10-CM

## 2019-06-19 DIAGNOSIS — E78.1 PURE HYPERGLYCERIDEMIA: ICD-10-CM

## 2019-06-19 DIAGNOSIS — E03.9 HYPOTHYROIDISM (ACQUIRED): ICD-10-CM

## 2019-06-19 LAB
ALBUMIN SERPL BCP-MCNC: 3.7 G/DL (ref 3–5.2)
ALP SERPL-CCNC: 70 U/L (ref 43–122)
ALT SERPL W P-5'-P-CCNC: 22 U/L (ref 9–52)
ANION GAP SERPL CALCULATED.3IONS-SCNC: 9 MMOL/L (ref 5–14)
AST SERPL W P-5'-P-CCNC: 26 U/L (ref 14–36)
BILIRUB SERPL-MCNC: 0.8 MG/DL
BUN SERPL-MCNC: 13 MG/DL (ref 5–25)
CALCIUM SERPL-MCNC: 8.4 MG/DL (ref 8.4–10.2)
CHLORIDE SERPL-SCNC: 102 MMOL/L (ref 97–108)
CHOLEST SERPL-MCNC: 154 MG/DL
CO2 SERPL-SCNC: 29 MMOL/L (ref 22–30)
CREAT SERPL-MCNC: 0.58 MG/DL (ref 0.6–1.2)
GFR SERPL CREATININE-BSD FRML MDRD: 113 ML/MIN/1.73SQ M
GLUCOSE P FAST SERPL-MCNC: 107 MG/DL (ref 70–99)
HDLC SERPL-MCNC: 42 MG/DL (ref 40–59)
LDLC SERPL CALC-MCNC: 97 MG/DL
NONHDLC SERPL-MCNC: 112 MG/DL
POTASSIUM SERPL-SCNC: 4 MMOL/L (ref 3.6–5)
PROT SERPL-MCNC: 7.1 G/DL (ref 5.9–8.4)
SODIUM SERPL-SCNC: 140 MMOL/L (ref 137–147)
TRIGL SERPL-MCNC: 76 MG/DL
TSH SERPL DL<=0.05 MIU/L-ACNC: 1.82 UIU/ML (ref 0.47–4.68)

## 2019-06-19 PROCEDURE — 80053 COMPREHEN METABOLIC PANEL: CPT

## 2019-06-19 PROCEDURE — 80061 LIPID PANEL: CPT

## 2019-06-19 PROCEDURE — 84443 ASSAY THYROID STIM HORMONE: CPT

## 2019-06-19 PROCEDURE — 36415 COLL VENOUS BLD VENIPUNCTURE: CPT

## 2019-06-20 ENCOUNTER — OFFICE VISIT (OUTPATIENT)
Dept: FAMILY MEDICINE CLINIC | Facility: CLINIC | Age: 64
End: 2019-06-20
Payer: COMMERCIAL

## 2019-06-20 VITALS
RESPIRATION RATE: 16 BRPM | BODY MASS INDEX: 40.48 KG/M2 | OXYGEN SATURATION: 93 % | WEIGHT: 220 LBS | HEIGHT: 62 IN | HEART RATE: 88 BPM | SYSTOLIC BLOOD PRESSURE: 106 MMHG | DIASTOLIC BLOOD PRESSURE: 70 MMHG | TEMPERATURE: 98 F

## 2019-06-20 DIAGNOSIS — I10 HYPERTENSION, UNSPECIFIED TYPE: Primary | ICD-10-CM

## 2019-06-20 DIAGNOSIS — E03.9 HYPOTHYROIDISM (ACQUIRED): ICD-10-CM

## 2019-06-20 DIAGNOSIS — M25.561 ACUTE PAIN OF RIGHT KNEE: ICD-10-CM

## 2019-06-20 DIAGNOSIS — E78.2 MIXED HYPERLIPIDEMIA: Primary | ICD-10-CM

## 2019-06-20 PROCEDURE — 3074F SYST BP LT 130 MM HG: CPT | Performed by: FAMILY MEDICINE

## 2019-06-20 PROCEDURE — 3078F DIAST BP <80 MM HG: CPT | Performed by: FAMILY MEDICINE

## 2019-06-20 PROCEDURE — 99214 OFFICE O/P EST MOD 30 MIN: CPT | Performed by: FAMILY MEDICINE

## 2019-06-20 PROCEDURE — 1036F TOBACCO NON-USER: CPT | Performed by: FAMILY MEDICINE

## 2019-06-20 PROCEDURE — 3008F BODY MASS INDEX DOCD: CPT | Performed by: FAMILY MEDICINE

## 2019-06-20 PROCEDURE — 20610 DRAIN/INJ JOINT/BURSA W/O US: CPT | Performed by: FAMILY MEDICINE

## 2019-06-20 RX ORDER — ROSUVASTATIN CALCIUM 5 MG/1
5 TABLET, COATED ORAL DAILY
Qty: 90 TABLET | Refills: 3 | Status: SHIPPED | OUTPATIENT
Start: 2019-06-20 | End: 2020-05-26 | Stop reason: SDUPTHER

## 2019-06-20 RX ORDER — ATORVASTATIN CALCIUM 10 MG/1
10 TABLET, FILM COATED ORAL DAILY
Qty: 30 TABLET | Refills: 5 | Status: SHIPPED | OUTPATIENT
Start: 2019-06-20 | End: 2019-06-20 | Stop reason: ALTCHOICE

## 2019-06-20 RX ORDER — TRIAMCINOLONE ACETONIDE 40 MG/ML
20 INJECTION, SUSPENSION INTRA-ARTICULAR; INTRAMUSCULAR
Status: COMPLETED | OUTPATIENT
Start: 2019-06-20 | End: 2019-06-20

## 2019-06-20 RX ADMIN — TRIAMCINOLONE ACETONIDE 20 MG: 40 INJECTION, SUSPENSION INTRA-ARTICULAR; INTRAMUSCULAR at 14:20

## 2019-09-25 RX ORDER — LEVOTHYROXINE SODIUM 0.05 MG/1
TABLET ORAL DAILY
COMMUNITY
End: 2019-09-27 | Stop reason: SDUPTHER

## 2019-09-25 RX ORDER — ASPIRIN 81 MG/1
TABLET, CHEWABLE ORAL DAILY
COMMUNITY
End: 2020-05-26 | Stop reason: ALTCHOICE

## 2019-09-27 ENCOUNTER — OFFICE VISIT (OUTPATIENT)
Dept: FAMILY MEDICINE CLINIC | Facility: CLINIC | Age: 64
End: 2019-09-27
Payer: COMMERCIAL

## 2019-09-27 VITALS
TEMPERATURE: 98 F | DIASTOLIC BLOOD PRESSURE: 68 MMHG | HEART RATE: 86 BPM | WEIGHT: 219 LBS | SYSTOLIC BLOOD PRESSURE: 122 MMHG | OXYGEN SATURATION: 96 % | HEIGHT: 62 IN | BODY MASS INDEX: 40.3 KG/M2

## 2019-09-27 DIAGNOSIS — Z23 NEED FOR TDAP VACCINATION: ICD-10-CM

## 2019-09-27 DIAGNOSIS — R42 DIZZINESS: Primary | ICD-10-CM

## 2019-09-27 DIAGNOSIS — Z13.220 SCREENING FOR CHOLESTEROL LEVEL: ICD-10-CM

## 2019-09-27 DIAGNOSIS — E66.01 CLASS 3 SEVERE OBESITY DUE TO EXCESS CALORIES WITH SERIOUS COMORBIDITY AND BODY MASS INDEX (BMI) OF 40.0 TO 44.9 IN ADULT (HCC): ICD-10-CM

## 2019-09-27 DIAGNOSIS — I10 HYPERTENSION, UNSPECIFIED TYPE: ICD-10-CM

## 2019-09-27 DIAGNOSIS — Z23 NEEDS FLU SHOT: ICD-10-CM

## 2019-09-27 DIAGNOSIS — Z12.11 SCREENING FOR COLON CANCER: ICD-10-CM

## 2019-09-27 DIAGNOSIS — R73.9 HYPERGLYCEMIA: ICD-10-CM

## 2019-09-27 DIAGNOSIS — J30.1 SEASONAL ALLERGIC RHINITIS DUE TO POLLEN: ICD-10-CM

## 2019-09-27 DIAGNOSIS — R82.998 URINE COLOR APPEARS BRIGHT: ICD-10-CM

## 2019-09-27 DIAGNOSIS — E03.9 HYPOTHYROIDISM (ACQUIRED): ICD-10-CM

## 2019-09-27 LAB — SL AMB POCT HEMOGLOBIN AIC: 6.3 (ref ?–6.5)

## 2019-09-27 PROCEDURE — 3008F BODY MASS INDEX DOCD: CPT | Performed by: NURSE PRACTITIONER

## 2019-09-27 PROCEDURE — 3074F SYST BP LT 130 MM HG: CPT | Performed by: NURSE PRACTITIONER

## 2019-09-27 PROCEDURE — 99214 OFFICE O/P EST MOD 30 MIN: CPT | Performed by: NURSE PRACTITIONER

## 2019-09-27 PROCEDURE — 3078F DIAST BP <80 MM HG: CPT | Performed by: NURSE PRACTITIONER

## 2019-09-27 PROCEDURE — 83036 HEMOGLOBIN GLYCOSYLATED A1C: CPT | Performed by: NURSE PRACTITIONER

## 2019-09-27 RX ORDER — OLMESARTAN MEDOXOMIL 40 MG/1
40 TABLET ORAL DAILY
Qty: 90 TABLET | Refills: 1 | Status: SHIPPED | OUTPATIENT
Start: 2019-09-27 | End: 2019-11-21 | Stop reason: SDUPTHER

## 2019-09-27 RX ORDER — HYDROCHLOROTHIAZIDE 25 MG/1
25 TABLET ORAL EVERY MORNING
Qty: 90 TABLET | Refills: 1 | Status: SHIPPED | OUTPATIENT
Start: 2019-09-27 | End: 2019-11-21 | Stop reason: SDUPTHER

## 2019-09-27 RX ORDER — LEVOTHYROXINE SODIUM 0.05 MG/1
50 TABLET ORAL DAILY
Qty: 90 TABLET | Refills: 1 | Status: SHIPPED | OUTPATIENT
Start: 2019-09-27 | End: 2020-03-10 | Stop reason: SDUPTHER

## 2019-09-27 NOTE — LETTER
September 27, 2019     Patient: Gatito Ramírez   YOB: 1955   Date of Visit: 9/27/2019       To Whom it May Concern:    Gatito Ramírez is under my professional care  She was seen in my office on 9/27/2019  She may return to work on 9/30/19  If you have any questions or concerns, please don't hesitate to call           Sincerely,          FAISAL Causey        CC: No Recipients

## 2019-09-27 NOTE — PROGRESS NOTES
Assessment/Plan:    Dizziness  -A1c in office was 6 3%  Blood sugar patient took in her unit was 138  Has subsided while in office after drinking a protein shake  However, patient states that she barely drinks water is currently taking a diuretic medication which can further dehydrate her  I advised to patient take frequent breaks and ensure she is drinking at lease 1-2L water a day  To follow up if s/s recur  Hypertension  -With good control  To continue on Olmesartan 40mg daily and HCTZ  To have repeat CMP bloodwork done  To continue to follow her diet regimen and healthy lifestyle  Hypothyroidism (acquired)  Discussed with patient that the goal of treatment is reduction of symptoms and prevention of long term complications  Treatment is usually given upon establishing the diagnosis and is lifelong  Discussed with patient that there is no evidence that "natural" or pig thyroid extract provides clinically reliable therapy  Also discussed with patient the main complication of over-replacement of thyroid hormone Is increased risk of osteoporosis and a-fib  We discussed increasing or decreasing in small increment every 8 weeks to normalize TSH level  Once we receive at least 2 normal TSH levels we will monitor TSH level every 6-12 months     -To continue on current Levothyroxine 50 mcg as indicated  Repeat TSH level  Diagnoses and all orders for this visit:    Dizziness  -     CBC and differential; Future  -     POCT hemoglobin A1c    Hypothyroidism (acquired)  -     levothyroxine (SYNTHROID) 50 mcg tablet; Take 1 tablet (50 mcg total) by mouth daily    Hypertension, unspecified type  -     hydrochlorothiazide (HYDRODIURIL) 25 mg tablet; Take 1 tablet (25 mg total) by mouth every morning  -     olmesartan (BENICAR) 40 mg tablet;  Take 1 tablet (40 mg total) by mouth daily    Needs flu shot    Need for Tdap vaccination    Screening for colon cancer    Class 3 severe obesity due to excess calories with serious comorbidity and body mass index (BMI) of 40 0 to 44 9 in adult (HCC)  -     TSH, 3rd generation with Free T4 reflex; Future    Hyperglycemia  -     Comprehensive metabolic panel; Future  -     Cancel: Hemoglobin A1C; Future    Screening for cholesterol level  -     Lipid panel; Future    Urine color appears bright  -     UA (URINE) with reflex to Scope    Seasonal allergic rhinitis due to pollen    Other orders  -     Cancel: TDAP VACCINE GREATER THAN OR EQUAL TO 8YO IM  -     Cancel: Occult Blood, Fecal Immunochemical; Future  -     Cancel: influenza vaccine, [id number], quadrivalent, recombinant, PF, 0 5 mL, for patients 18 yr+ (FLUBLOK)  -     aspirin 81 mg chewable tablet; Chew Daily  -     Discontinue: sitaGLIPtin (JANUVIA) 100 mg tablet; Take by mouth Daily  -     Discontinue: levothyroxine (SYNTHROID) 50 mcg tablet; Take by mouth Daily          Subjective:      Patient ID: Cristina Bryant is a 62 y o  female  62year old female patient is here for follow up  States that she felt dizziness today and her blood glucose was 138 per patient  States she drank a protein shake and her yogurt and did not feel that dizzy today  States she took her water pill this morning  She is not drinking enough water  Patient does not drink enough water at her floor in the hospital because they only allow her to drink in the break room  Denies any chest pain/palpitations  Denies any burning on urination    Thyroid Problem   Presents for follow-up visit  Symptoms include fatigue and hair loss  Patient reports no anxiety, constipation, diaphoresis, diarrhea, leg swelling, menstrual problem, nail problem, palpitations, tremors, weight gain or weight loss  The symptoms have been stable  Hypertension   This is a recurrent problem  The current episode started today  The problem has been gradually improving since onset  The problem is controlled   Pertinent negatives include no chest pain, headaches, palpitations, peripheral edema, shortness of breath or sweats  Agents associated with hypertension include thyroid hormones  Risk factors for coronary artery disease include obesity, sedentary lifestyle, stress and family history  Past treatments include angiotensin blockers and diuretics  The current treatment provides significant improvement  Compliance problems include exercise and diet  There is no history of angina, CAD/MI, CVA, left ventricular hypertrophy or retinopathy  Identifiable causes of hypertension include a thyroid problem  There is no history of chronic renal disease, a hypertension causing med or sleep apnea  The following portions of the patient's history were reviewed and updated as appropriate: allergies, current medications, past family history, past medical history, past social history, past surgical history and problem list     Review of Systems   Constitutional: Positive for fatigue  Negative for appetite change, diaphoresis, fever, weight gain and weight loss  HENT: Positive for postnasal drip  Negative for congestion, sinus pressure, sinus pain and sore throat  Eyes: Negative  Respiratory: Negative  Negative for cough, chest tightness, shortness of breath and wheezing  Cardiovascular: Negative  Negative for chest pain and palpitations  Gastrointestinal: Negative  Negative for constipation and diarrhea  Endocrine: Negative  Genitourinary: Negative  Negative for menstrual problem  Musculoskeletal: Negative  Negative for arthralgias and gait problem  Skin: Negative for color change, pallor and rash  Allergic/Immunologic: Positive for environmental allergies  Neurological: Positive for dizziness  Negative for tremors, seizures, speech difficulty and headaches  Hematological: Negative  Negative for adenopathy  Does not bruise/bleed easily  Psychiatric/Behavioral: Negative  The patient is not nervous/anxious            Objective:      /68 (BP Location: Left arm, Patient Position: Sitting, Cuff Size: Adult)   Pulse 86   Temp 98 °F (36 7 °C) (Oral)   Ht 5' 2" (1 575 m)   Wt 99 3 kg (219 lb)   SpO2 96%   BMI 40 06 kg/m²          Physical Exam   Constitutional: She is oriented to person, place, and time  She appears well-developed and well-nourished  No distress  HENT:   Head: Normocephalic and atraumatic  Right Ear: External ear normal    Left Ear: External ear normal    Eyes: Pupils are equal, round, and reactive to light  EOM are normal    Neck: Normal range of motion  Neck supple  Cardiovascular: Normal rate, regular rhythm, normal heart sounds and intact distal pulses  Pulmonary/Chest: Effort normal and breath sounds normal  No respiratory distress  She has no wheezes  She has no rales  Abdominal: Soft  Bowel sounds are normal    Musculoskeletal: Normal range of motion  Lymphadenopathy:     She has no cervical adenopathy  Neurological: She is alert and oriented to person, place, and time  She has normal reflexes  She displays normal reflexes  No cranial nerve deficit or sensory deficit  She exhibits normal muscle tone  Coordination normal    Skin: Skin is warm and dry  Capillary refill takes less than 2 seconds  She is not diaphoretic  Psychiatric: She has a normal mood and affect  Thought content normal    Nursing note and vitals reviewed

## 2019-09-30 PROBLEM — R42 DIZZINESS: Status: ACTIVE | Noted: 2019-09-30

## 2019-09-30 NOTE — ASSESSMENT & PLAN NOTE
-A1c in office was 6 3%  Blood sugar patient took in her unit was 138  Has subsided while in office after drinking a protein shake  However, patient states that she barely drinks water is currently taking a diuretic medication which can further dehydrate her  I advised to patient take frequent breaks and ensure she is drinking at lease 1-2L water a day  To follow up if s/s recur

## 2019-09-30 NOTE — ASSESSMENT & PLAN NOTE
Discussed with patient that the goal of treatment is reduction of symptoms and prevention of long term complications  Treatment is usually given upon establishing the diagnosis and is lifelong  Discussed with patient that there is no evidence that "natural" or pig thyroid extract provides clinically reliable therapy  Also discussed with patient the main complication of over-replacement of thyroid hormone Is increased risk of osteoporosis and a-fib  We discussed increasing or decreasing in small increment every 8 weeks to normalize TSH level  Once we receive at least 2 normal TSH levels we will monitor TSH level every 6-12 months     -To continue on current Levothyroxine 50 mcg as indicated  Repeat TSH level

## 2019-09-30 NOTE — ASSESSMENT & PLAN NOTE
-With good control  To continue on Olmesartan 40mg daily and HCTZ  To have repeat CMP bloodwork done  To continue to follow her diet regimen and healthy lifestyle

## 2019-10-03 ENCOUNTER — VBI (OUTPATIENT)
Dept: ADMINISTRATIVE | Facility: OTHER | Age: 64
End: 2019-10-03

## 2019-10-03 NOTE — TELEPHONE ENCOUNTER
Initial attempt made and documented for CRC Screening Patient/Employee Outreach on 10/03/19  Follow-up outreach scheduled to be completed within 7 business days      Valeriano Jon MA

## 2019-10-17 ENCOUNTER — APPOINTMENT (OUTPATIENT)
Dept: LAB | Facility: HOSPITAL | Age: 64
End: 2019-10-17
Payer: COMMERCIAL

## 2019-10-17 DIAGNOSIS — Z13.220 SCREENING FOR CHOLESTEROL LEVEL: ICD-10-CM

## 2019-10-17 DIAGNOSIS — R73.9 HYPERGLYCEMIA: ICD-10-CM

## 2019-10-17 DIAGNOSIS — R42 DIZZINESS: ICD-10-CM

## 2019-10-17 DIAGNOSIS — E66.01 CLASS 3 SEVERE OBESITY DUE TO EXCESS CALORIES WITH SERIOUS COMORBIDITY AND BODY MASS INDEX (BMI) OF 40.0 TO 44.9 IN ADULT (HCC): ICD-10-CM

## 2019-10-17 LAB
ALBUMIN SERPL BCP-MCNC: 3.7 G/DL (ref 3–5.2)
ALP SERPL-CCNC: 62 U/L (ref 43–122)
ALT SERPL W P-5'-P-CCNC: 22 U/L (ref 9–52)
ANION GAP SERPL CALCULATED.3IONS-SCNC: 6 MMOL/L (ref 5–14)
AST SERPL W P-5'-P-CCNC: 27 U/L (ref 14–36)
BACTERIA UR QL AUTO: ABNORMAL /HPF
BASOPHILS # BLD AUTO: 0.1 THOUSANDS/ΜL (ref 0–0.1)
BASOPHILS NFR BLD AUTO: 1 % (ref 0–1)
BILIRUB SERPL-MCNC: 0.8 MG/DL
BILIRUB UR QL STRIP: NEGATIVE
BUN SERPL-MCNC: 15 MG/DL (ref 5–25)
CALCIUM SERPL-MCNC: 9.1 MG/DL (ref 8.4–10.2)
CHLORIDE SERPL-SCNC: 102 MMOL/L (ref 97–108)
CHOLEST SERPL-MCNC: 166 MG/DL
CLARITY UR: ABNORMAL
CO2 SERPL-SCNC: 31 MMOL/L (ref 22–30)
COLOR UR: YELLOW
CREAT SERPL-MCNC: 0.67 MG/DL (ref 0.6–1.2)
EOSINOPHIL # BLD AUTO: 0.3 THOUSAND/ΜL (ref 0–0.4)
EOSINOPHIL NFR BLD AUTO: 3 % (ref 0–6)
ERYTHROCYTE [DISTWIDTH] IN BLOOD BY AUTOMATED COUNT: 14.7 %
GFR SERPL CREATININE-BSD FRML MDRD: 108 ML/MIN/1.73SQ M
GLUCOSE P FAST SERPL-MCNC: 115 MG/DL (ref 70–99)
GLUCOSE UR STRIP-MCNC: NEGATIVE MG/DL
HCT VFR BLD AUTO: 43.4 % (ref 36–46)
HDLC SERPL-MCNC: 37 MG/DL (ref 40–59)
HGB BLD-MCNC: 14.3 G/DL (ref 12–16)
HGB UR QL STRIP.AUTO: NEGATIVE
KETONES UR STRIP-MCNC: NEGATIVE MG/DL
LDLC SERPL CALC-MCNC: 111 MG/DL
LEUKOCYTE ESTERASE UR QL STRIP: 500
LYMPHOCYTES # BLD AUTO: 3.5 THOUSANDS/ΜL (ref 0.5–4)
LYMPHOCYTES NFR BLD AUTO: 41 % (ref 25–45)
MCH RBC QN AUTO: 26.6 PG (ref 26–34)
MCHC RBC AUTO-ENTMCNC: 32.9 G/DL (ref 31–36)
MCV RBC AUTO: 81 FL (ref 80–100)
MONOCYTES # BLD AUTO: 0.8 THOUSAND/ΜL (ref 0.2–0.9)
MONOCYTES NFR BLD AUTO: 9 % (ref 1–10)
NEUTROPHILS # BLD AUTO: 3.9 THOUSANDS/ΜL (ref 1.8–7.8)
NEUTS SEG NFR BLD AUTO: 46 % (ref 45–65)
NITRITE UR QL STRIP: NEGATIVE
NON-SQ EPI CELLS URNS QL MICRO: ABNORMAL /HPF
NONHDLC SERPL-MCNC: 129 MG/DL
PH UR STRIP.AUTO: 7 [PH]
PLATELET # BLD AUTO: 260 THOUSANDS/UL (ref 150–450)
PMV BLD AUTO: 10.3 FL (ref 8.9–12.7)
POTASSIUM SERPL-SCNC: 4 MMOL/L (ref 3.6–5)
PROT SERPL-MCNC: 7 G/DL (ref 5.9–8.4)
PROT UR STRIP-MCNC: ABNORMAL MG/DL
RBC # BLD AUTO: 5.37 MILLION/UL (ref 4–5.2)
RBC #/AREA URNS AUTO: ABNORMAL /HPF
SODIUM SERPL-SCNC: 139 MMOL/L (ref 137–147)
SP GR UR STRIP.AUTO: 1.01 (ref 1–1.04)
TRIGL SERPL-MCNC: 90 MG/DL
TSH SERPL DL<=0.05 MIU/L-ACNC: 4.1 UIU/ML (ref 0.47–4.68)
UROBILINOGEN UA: NEGATIVE MG/DL
WBC # BLD AUTO: 8.6 THOUSAND/UL (ref 4.5–11)
WBC #/AREA URNS AUTO: ABNORMAL /HPF

## 2019-10-17 PROCEDURE — 85025 COMPLETE CBC W/AUTO DIFF WBC: CPT

## 2019-10-17 PROCEDURE — 80061 LIPID PANEL: CPT

## 2019-10-17 PROCEDURE — 84443 ASSAY THYROID STIM HORMONE: CPT

## 2019-10-17 PROCEDURE — 80053 COMPREHEN METABOLIC PANEL: CPT

## 2019-10-17 PROCEDURE — 36415 COLL VENOUS BLD VENIPUNCTURE: CPT

## 2019-10-17 PROCEDURE — 81001 URINALYSIS AUTO W/SCOPE: CPT | Performed by: NURSE PRACTITIONER

## 2019-11-21 ENCOUNTER — OFFICE VISIT (OUTPATIENT)
Dept: FAMILY MEDICINE CLINIC | Facility: CLINIC | Age: 64
End: 2019-11-21
Payer: COMMERCIAL

## 2019-11-21 VITALS
SYSTOLIC BLOOD PRESSURE: 130 MMHG | HEART RATE: 88 BPM | HEIGHT: 62 IN | OXYGEN SATURATION: 94 % | TEMPERATURE: 97.8 F | BODY MASS INDEX: 40.67 KG/M2 | DIASTOLIC BLOOD PRESSURE: 80 MMHG | RESPIRATION RATE: 18 BRPM | WEIGHT: 221 LBS

## 2019-11-21 DIAGNOSIS — Z00.00 ANNUAL PHYSICAL EXAM: ICD-10-CM

## 2019-11-21 DIAGNOSIS — Z12.11 ENCOUNTER FOR SCREENING COLONOSCOPY: ICD-10-CM

## 2019-11-21 DIAGNOSIS — N39.0 URINARY TRACT INFECTION WITHOUT HEMATURIA, SITE UNSPECIFIED: Primary | ICD-10-CM

## 2019-11-21 DIAGNOSIS — K29.60 OTHER GASTRITIS WITHOUT HEMORRHAGE, UNSPECIFIED CHRONICITY: ICD-10-CM

## 2019-11-21 DIAGNOSIS — I10 HYPERTENSION, UNSPECIFIED TYPE: ICD-10-CM

## 2019-11-21 DIAGNOSIS — Z11.4 ENCOUNTER FOR SCREENING FOR HIV: ICD-10-CM

## 2019-11-21 DIAGNOSIS — M25.561 ACUTE PAIN OF RIGHT KNEE: ICD-10-CM

## 2019-11-21 DIAGNOSIS — L25.9 CONTACT DERMATITIS, UNSPECIFIED CONTACT DERMATITIS TYPE, UNSPECIFIED TRIGGER: ICD-10-CM

## 2019-11-21 DIAGNOSIS — Z00.01 ENCOUNTER FOR WELL ADULT EXAM WITH ABNORMAL FINDINGS: ICD-10-CM

## 2019-11-21 LAB
SL AMB  POCT GLUCOSE, UA: NEGATIVE
SL AMB LEUKOCYTE ESTERASE,UA: ABNORMAL
SL AMB POCT BILIRUBIN,UA: NEGATIVE
SL AMB POCT BLOOD,UA: ABNORMAL
SL AMB POCT CLARITY,UA: ABNORMAL
SL AMB POCT COLOR,UA: ABNORMAL
SL AMB POCT KETONES,UA: ABNORMAL
SL AMB POCT NITRITE,UA: NEGATIVE
SL AMB POCT PH,UA: 6.5
SL AMB POCT SPECIFIC GRAVITY,UA: 1.02
SL AMB POCT URINE PROTEIN: NEGATIVE
SL AMB POCT UROBILINOGEN: 0.2

## 2019-11-21 PROCEDURE — 99214 OFFICE O/P EST MOD 30 MIN: CPT | Performed by: NURSE PRACTITIONER

## 2019-11-21 PROCEDURE — 81002 URINALYSIS NONAUTO W/O SCOPE: CPT | Performed by: NURSE PRACTITIONER

## 2019-11-21 PROCEDURE — 99396 PREV VISIT EST AGE 40-64: CPT | Performed by: NURSE PRACTITIONER

## 2019-11-21 RX ORDER — PANTOPRAZOLE SODIUM 40 MG/1
40 TABLET, DELAYED RELEASE ORAL DAILY
Qty: 30 TABLET | Refills: 5 | Status: SHIPPED | OUTPATIENT
Start: 2019-11-21 | End: 2020-10-16 | Stop reason: SDUPTHER

## 2019-11-21 RX ORDER — NITROFURANTOIN 25; 75 MG/1; MG/1
100 CAPSULE ORAL 2 TIMES DAILY
Qty: 10 CAPSULE | Refills: 0 | Status: SHIPPED | OUTPATIENT
Start: 2019-11-21 | End: 2019-11-26

## 2019-11-21 RX ORDER — OLMESARTAN MEDOXOMIL 40 MG/1
40 TABLET ORAL DAILY
Qty: 90 TABLET | Refills: 1 | Status: SHIPPED | OUTPATIENT
Start: 2019-11-21 | End: 2020-03-10 | Stop reason: SDUPTHER

## 2019-11-21 RX ORDER — HYDROCHLOROTHIAZIDE 25 MG/1
25 TABLET ORAL EVERY MORNING
Qty: 90 TABLET | Refills: 1 | Status: SHIPPED | OUTPATIENT
Start: 2019-11-21 | End: 2020-03-10 | Stop reason: SDUPTHER

## 2019-11-21 NOTE — ASSESSMENT & PLAN NOTE
-POCT urine with leukocytes  Starting patient on oral antibiotics  Advised to increase fluid intake and drink cranberry juice  Wipe front to back and void often and not hold in urine during work hours  To follow up if s/s do not resolve

## 2019-11-21 NOTE — PATIENT INSTRUCTIONS

## 2019-11-21 NOTE — PROGRESS NOTES
Assessment/Plan:    Urinary tract infection without hematuria  -POCT urine with leukocytes  Starting patient on oral antibiotics  Advised to increase fluid intake and drink cranberry juice  Wipe front to back and void often and not hold in urine during work hours  To follow up if s/s do not resolve  Acute pain of right knee  -This is a recurrent problem  Patient was advised to trial out NSAIDs and if these do not resolve or improve her knee pain to return for possible knee injections  Pt in agreement with plan of care  Encounter for well adult exam with abnormal findings  Patient is here for physical exam we find this visit without any distress or abnormalities  We recommended exercise at least three and 0 5 hours per week and healthy diet with a low carbohydrate and low saturated fat  Began to follow up in one year for regular physical exams  Diagnoses and all orders for this visit:    Urinary tract infection without hematuria, site unspecified  -     nitrofurantoin (MACROBID) 100 mg capsule; Take 1 capsule (100 mg total) by mouth 2 (two) times a day for 5 days  -     POCT urine dip    Acute pain of right knee    Encounter for well adult exam with abnormal findings    Encounter for screening colonoscopy  -     Ambulatory referral to Gastroenterology; Future    Annual physical exam    Encounter for screening for HIV  -     HIV 1/2 AG-AB combo; Future    Contact dermatitis, unspecified contact dermatitis type, unspecified trigger  -     triamcinolone (KENALOG) 0 1 % ointment; Apply topically 2 (two) times a day    Hypertension, unspecified type  -     olmesartan (BENICAR) 40 mg tablet; Take 1 tablet (40 mg total) by mouth daily  -     hydrochlorothiazide (HYDRODIURIL) 25 mg tablet; Take 1 tablet (25 mg total) by mouth every morning    Other gastritis without hemorrhage, unspecified chronicity  -     pantoprazole (PROTONIX) 40 mg tablet;  Take 1 tablet (40 mg total) by mouth daily          Subjective: Patient ID: Vicente Altamirano is a 62 y o  female  62year old female patient here for a physical exam        The following portions of the patient's history were reviewed and updated as appropriate: allergies, current medications, past family history, past medical history, past social history, past surgical history and problem list     Review of Systems   Constitutional: Negative  Negative for appetite change, fatigue and fever  HENT: Negative  Eyes: Negative  Respiratory: Negative  Negative for cough, chest tightness, shortness of breath and wheezing  Cardiovascular: Negative  Negative for chest pain and palpitations  Gastrointestinal: Negative  Endocrine: Negative  Genitourinary: Positive for dysuria, frequency and urgency  Musculoskeletal: Positive for arthralgias and gait problem  Skin: Negative  Negative for color change and rash  Allergic/Immunologic: Negative  Hematological: Negative  Psychiatric/Behavioral: Negative  Objective:      /80 (BP Location: Left arm, Patient Position: Sitting, Cuff Size: Standard)   Pulse 88   Temp 97 8 °F (36 6 °C) (Oral)   Resp 18   Ht 5' 2" (1 575 m)   Wt 100 kg (221 lb)   SpO2 94%   BMI 40 42 kg/m²          Physical Exam   Constitutional: She is oriented to person, place, and time  She appears well-developed and well-nourished  No distress  HENT:   Head: Normocephalic and atraumatic  Right Ear: External ear normal    Left Ear: External ear normal    Nose: Nose normal    Mouth/Throat: Oropharynx is clear and moist    Eyes: Pupils are equal, round, and reactive to light  Conjunctivae and EOM are normal  Right eye exhibits no discharge  Left eye exhibits no discharge  Neck: Normal range of motion  Neck supple  No thyromegaly present  Cardiovascular: Normal rate, regular rhythm, normal heart sounds and intact distal pulses  Exam reveals no gallop and no friction rub  No murmur heard    Pulmonary/Chest: Effort normal and breath sounds normal  No respiratory distress  She has no wheezes  Abdominal: Soft  Bowel sounds are normal  She exhibits no distension  There is no tenderness  Musculoskeletal: Normal range of motion  She exhibits tenderness (right knee tenderness)  Neurological: She is alert and oriented to person, place, and time  She has normal reflexes  Skin: Skin is warm and dry  Capillary refill takes less than 2 seconds  She is not diaphoretic  Psychiatric: She has a normal mood and affect  Her behavior is normal  Judgment and thought content normal    Nursing note and vitals reviewed

## 2019-11-21 NOTE — ASSESSMENT & PLAN NOTE
-This is a recurrent problem  Patient was advised to trial out NSAIDs and if these do not resolve or improve her knee pain to return for possible knee injections  Pt in agreement with plan of care

## 2019-11-21 NOTE — PROGRESS NOTES
Assessment/Plan:    Urinary tract infection without hematuria  -POCT urine with leukocytes  Starting patient on oral antibiotics  Advised to increase fluid intake and drink cranberry juice  Wipe front to back and void often and not hold in urine during work hours  To follow up if s/s do not resolve  Acute pain of right knee  -This is a recurrent problem  Patient was advised to trial out NSAIDs and if these do not resolve or improve her knee pain to return for possible knee injections  Pt in agreement with plan of care  Diagnoses and all orders for this visit:    Urinary tract infection without hematuria, site unspecified  -     nitrofurantoin (MACROBID) 100 mg capsule; Take 1 capsule (100 mg total) by mouth 2 (two) times a day for 5 days  -     POCT urine dip    Acute pain of right knee    Encounter for screening colonoscopy  -     Ambulatory referral to Gastroenterology; Future    Annual physical exam    Encounter for screening for HIV  -     HIV 1/2 AG-AB combo; Future    Contact dermatitis, unspecified contact dermatitis type, unspecified trigger  -     triamcinolone (KENALOG) 0 1 % ointment; Apply topically 2 (two) times a day    Hypertension, unspecified type  -     olmesartan (BENICAR) 40 mg tablet; Take 1 tablet (40 mg total) by mouth daily  -     hydrochlorothiazide (HYDRODIURIL) 25 mg tablet; Take 1 tablet (25 mg total) by mouth every morning    Other gastritis without hemorrhage, unspecified chronicity  -     pantoprazole (PROTONIX) 40 mg tablet; Take 1 tablet (40 mg total) by mouth daily          Subjective:      Patient ID: Ajay Ferrer is a 62 y o  female  Patient presents with:  Physical Exam    62 year female patient here for a physical exam      Knee Pain    The incident occurred more than 1 week ago  The incident occurred at home  The injury mechanism was a twisting injury  The pain is present in the right knee  The quality of the pain is described as aching   The pain is at a severity of 9/10  The pain is severe  The pain has been fluctuating since onset  Pertinent negatives include no inability to bear weight, loss of motion, numbness or tingling  She reports no foreign bodies present  The symptoms are aggravated by weight bearing, palpation and movement  She has tried ice and elevation for the symptoms  The treatment provided no relief  Urinary Frequency    This is a recurrent problem  The current episode started 1 to 4 weeks ago  The problem occurs every urination  The problem has been waxing and waning  The pain is at a severity of 0/10  The patient is experiencing no pain  There has been no fever  The fever has been present for less than 1 day  She is not sexually active  There is no history of pyelonephritis  Associated symptoms include frequency and urgency  Pertinent negatives include no flank pain, nausea, possible pregnancy or vomiting  Treatments tried: oxybutynin  The treatment provided no relief  There is no history of catheterization, kidney stones, urinary stasis or a urological procedure  The following portions of the patient's history were reviewed and updated as appropriate: allergies, current medications, past family history, past medical history, past social history, past surgical history and problem list     Review of Systems   Constitutional: Negative  Negative for appetite change, fatigue and fever  HENT: Negative  Negative for congestion  Eyes: Negative  Respiratory: Negative  Negative for cough, chest tightness, shortness of breath and wheezing  Cardiovascular: Negative  Negative for chest pain and palpitations  Gastrointestinal: Negative  Negative for nausea and vomiting  Endocrine: Negative  Genitourinary: Positive for dysuria, frequency and urgency  Negative for flank pain  Musculoskeletal: Positive for arthralgias (right knee pain)  Negative for gait problem  Skin: Negative  Negative for color change and rash  Allergic/Immunologic: Negative  Neurological: Negative  Negative for tingling and numbness  Hematological: Negative  Psychiatric/Behavioral: Negative  Objective:      /80 (BP Location: Left arm, Patient Position: Sitting, Cuff Size: Standard)   Pulse 88   Temp 97 8 °F (36 6 °C) (Oral)   Resp 18   Ht 5' 2" (1 575 m)   Wt 100 kg (221 lb)   SpO2 94%   BMI 40 42 kg/m²          Physical Exam   Constitutional: She is oriented to person, place, and time  She appears well-developed and well-nourished  HENT:   Head: Normocephalic and atraumatic  Eyes: Pupils are equal, round, and reactive to light  EOM are normal    Neck: Normal range of motion  Neck supple  Cardiovascular: Normal rate, regular rhythm, normal heart sounds and intact distal pulses  Pulmonary/Chest: Effort normal and breath sounds normal  No respiratory distress  She has no wheezes  She has no rales  Abdominal: Soft  Normal appearance and bowel sounds are normal  There is no tenderness  There is no rigidity, no rebound, no guarding, no CVA tenderness, no tenderness at McBurney's point and negative Arenas's sign  No hernia  Musculoskeletal: Normal range of motion  She exhibits tenderness (right knee with mild swelling noted  No erythema)  Lymphadenopathy:     She has no cervical adenopathy  Neurological: She is alert and oriented to person, place, and time  She has normal reflexes  Skin: Skin is warm and dry  Psychiatric: She has a normal mood and affect   Thought content normal

## 2019-11-22 ENCOUNTER — OFFICE VISIT (OUTPATIENT)
Dept: FAMILY MEDICINE CLINIC | Facility: CLINIC | Age: 64
End: 2019-11-22
Payer: COMMERCIAL

## 2019-11-22 VITALS
RESPIRATION RATE: 16 BRPM | DIASTOLIC BLOOD PRESSURE: 70 MMHG | HEIGHT: 62 IN | BODY MASS INDEX: 40.67 KG/M2 | OXYGEN SATURATION: 98 % | HEART RATE: 80 BPM | TEMPERATURE: 98 F | WEIGHT: 221 LBS | SYSTOLIC BLOOD PRESSURE: 110 MMHG

## 2019-11-22 DIAGNOSIS — M25.561 ACUTE PAIN OF RIGHT KNEE: Primary | ICD-10-CM

## 2019-11-22 PROCEDURE — 99213 OFFICE O/P EST LOW 20 MIN: CPT | Performed by: FAMILY MEDICINE

## 2019-11-22 NOTE — PROGRESS NOTES
Assessment/Plan:    1  Acute pain of right knee  This joint was injected with 40 mg of triamcinolone  Patient tolerated the procedure very well  No complications  Recommended to put ice  home I will follow up the patient in the needed basis  - Large joint arthrocentesis: R knee        No problem-specific Assessment & Plan notes found for this encounter  There are no diagnoses linked to this encounter  Subjective:      Patient ID: Mary Hendricks is a 62 y o  female  Knee Pain    The incident occurred 3 to 5 days ago  The pain is present in the right knee  The pain is at a severity of 6/10  The pain has been intermittent since onset  Associated symptoms include a loss of sensation and muscle weakness  The treatment provided moderate relief  The following portions of the patient's history were reviewed and updated as appropriate: allergies, current medications, past family history, past medical history, past social history, past surgical history and problem list     Review of Systems   Constitutional: Negative  Respiratory: Negative  Cardiovascular: Negative  Musculoskeletal: Positive for arthralgias and joint swelling  Negative for back pain and gait problem  Psychiatric/Behavioral: Confusion: right knee  Objective:      /70 (BP Location: Left arm, Patient Position: Sitting, Cuff Size: Standard)   Pulse 80   Temp 98 °F (36 7 °C) (Oral)   Resp 16   Ht 5' 2" (1 575 m)   Wt 100 kg (221 lb)   SpO2 98%   Breastfeeding? No   BMI 40 42 kg/m²            Physical Exam   Cardiovascular: Exam reveals no friction rub  No murmur heard  Pulmonary/Chest: No stridor  No respiratory distress  Musculoskeletal: She exhibits tenderness (of right knee, effusion, Decreased ROM )  Neurological: No cranial nerve deficit  Coordination normal    Vitals reviewed      Large joint arthrocentesis: R knee  Date/Time: 12/1/2019 5:18 PM  Consent given by: patient  Supporting Documentation  Indications: pain and joint swelling   Procedure Details  Location: knee - R knee  Needle size: 25 G  Ultrasound guidance: no  Approach: anterior  Medications administered: 40 mg triamcinolone acetonide 40 mg/mL    Patient tolerance: patient tolerated the procedure well with no immediate complications  Dressing:  Sterile dressing applied

## 2019-12-01 PROCEDURE — 20610 DRAIN/INJ JOINT/BURSA W/O US: CPT | Performed by: FAMILY MEDICINE

## 2019-12-01 RX ORDER — TRIAMCINOLONE ACETONIDE 40 MG/ML
40 INJECTION, SUSPENSION INTRA-ARTICULAR; INTRAMUSCULAR
Status: COMPLETED | OUTPATIENT
Start: 2019-12-01 | End: 2019-12-01

## 2019-12-01 RX ADMIN — TRIAMCINOLONE ACETONIDE 40 MG: 40 INJECTION, SUSPENSION INTRA-ARTICULAR; INTRAMUSCULAR at 17:18

## 2020-01-21 ENCOUNTER — TELEPHONE (OUTPATIENT)
Dept: FAMILY MEDICINE CLINIC | Facility: CLINIC | Age: 65
End: 2020-01-21

## 2020-01-21 DIAGNOSIS — Z12.39 SCREENING FOR BREAST CANCER: Primary | ICD-10-CM

## 2020-03-10 DIAGNOSIS — I10 HYPERTENSION, UNSPECIFIED TYPE: ICD-10-CM

## 2020-03-10 DIAGNOSIS — J45.909 MILD ASTHMA WITHOUT COMPLICATION, UNSPECIFIED WHETHER PERSISTENT: ICD-10-CM

## 2020-03-10 DIAGNOSIS — E03.9 HYPOTHYROIDISM (ACQUIRED): ICD-10-CM

## 2020-03-12 RX ORDER — OLMESARTAN MEDOXOMIL 40 MG/1
40 TABLET ORAL DAILY
Qty: 90 TABLET | Refills: 3 | Status: SHIPPED | OUTPATIENT
Start: 2020-03-12 | End: 2020-05-26 | Stop reason: SDUPTHER

## 2020-03-12 RX ORDER — HYDROCHLOROTHIAZIDE 25 MG/1
25 TABLET ORAL EVERY MORNING
Qty: 90 TABLET | Refills: 3 | Status: SHIPPED | OUTPATIENT
Start: 2020-03-12 | End: 2020-05-26 | Stop reason: SDUPTHER

## 2020-03-12 RX ORDER — LEVOTHYROXINE SODIUM 0.05 MG/1
50 TABLET ORAL DAILY
Qty: 90 TABLET | Refills: 3 | Status: SHIPPED | OUTPATIENT
Start: 2020-03-12 | End: 2020-05-26 | Stop reason: SDUPTHER

## 2020-03-12 RX ORDER — ALBUTEROL SULFATE 2.5 MG/3ML
2.5 SOLUTION RESPIRATORY (INHALATION) EVERY 6 HOURS PRN
Qty: 100 VIAL | Refills: 5 | Status: SHIPPED | OUTPATIENT
Start: 2020-03-12 | End: 2021-12-29

## 2020-03-13 ENCOUNTER — HOSPITAL ENCOUNTER (OUTPATIENT)
Dept: MAMMOGRAPHY | Facility: CLINIC | Age: 65
Discharge: HOME | End: 2020-03-13
Payer: COMMERCIAL

## 2020-03-13 VITALS — HEIGHT: 62 IN | WEIGHT: 221 LBS | BODY MASS INDEX: 40.67 KG/M2

## 2020-03-13 DIAGNOSIS — Z12.31 ENCOUNTER FOR SCREENING MAMMOGRAM FOR MALIGNANT NEOPLASM OF BREAST: ICD-10-CM

## 2020-03-13 DIAGNOSIS — Z12.39 SCREENING FOR BREAST CANCER: ICD-10-CM

## 2020-03-13 PROCEDURE — 77063 BREAST TOMOSYNTHESIS BI: CPT

## 2020-03-13 PROCEDURE — 77067 SCR MAMMO BI INCL CAD: CPT

## 2020-03-17 NOTE — RESULT ENCOUNTER NOTE
Please call patient about Normal test  [FreeTextEntry1] : new pt [de-identified] : 53 M w/ PMH HTN, chronic back pain (neck and lower back), dysuria frequent (slow stream) with increased frequency, increased BMs (up to 10 times per day) for which he takes Imodium. The pt was recently in the ED for acute severe occipital HA, he was found to have /130, he was given medications and his BP decreased. The pt travels between  and [place], he stays in [place] for 4 months and then returns. He does not have a PMD, he has been traveling to [place] to get his BP meds, however, he does not think they have been working. The pt states the HA persists since the incident. \par \par The pt denies blurred vision, visual sxs, n/v

## 2020-03-18 NOTE — PRE-PROCEDURE INSTRUCTIONS
Pre-Surgery Instructions:   Medication Instructions    albuterol (2 5 mg/3 mL) 0 083 % nebulizer solution Instructed patient per Anesthesia Guidelines   olmesartan (Benicar) 40 mg tablet Instructed patient per Anesthesia Guidelines   pantoprazole (PROTONIX) 40 mg tablet Instructed patient per Anesthesia Guidelines   VENTOLIN  (90 Base) MCG/ACT inhaler Instructed patient per Anesthesia Guidelines

## 2020-03-20 ENCOUNTER — HOSPITAL ENCOUNTER (OUTPATIENT)
Dept: GASTROENTEROLOGY | Facility: HOSPITAL | Age: 65
Setting detail: OUTPATIENT SURGERY
Discharge: HOME | End: 2020-03-20
Attending: COLON & RECTAL SURGERY

## 2020-04-28 ENCOUNTER — TELEPHONE (OUTPATIENT)
Dept: CARDIOLOGY CLINIC | Facility: CLINIC | Age: 65
End: 2020-04-28

## 2020-05-06 DIAGNOSIS — Z87.898 HISTORY OF PREDIABETES: ICD-10-CM

## 2020-05-07 RX ORDER — LANCETS
EACH MISCELLANEOUS
Qty: 100 EACH | Refills: 3 | Status: SHIPPED | OUTPATIENT
Start: 2020-05-07 | End: 2021-12-28 | Stop reason: SDUPTHER

## 2020-05-12 DIAGNOSIS — Z87.898 HISTORY OF PREDIABETES: ICD-10-CM

## 2020-05-15 ENCOUNTER — TELEPHONE (OUTPATIENT)
Dept: CARDIOLOGY CLINIC | Facility: CLINIC | Age: 65
End: 2020-05-15

## 2020-05-26 ENCOUNTER — OFFICE VISIT (OUTPATIENT)
Dept: FAMILY MEDICINE CLINIC | Facility: CLINIC | Age: 65
End: 2020-05-26
Payer: COMMERCIAL

## 2020-05-26 VITALS
WEIGHT: 217 LBS | BODY MASS INDEX: 39.93 KG/M2 | SYSTOLIC BLOOD PRESSURE: 130 MMHG | DIASTOLIC BLOOD PRESSURE: 70 MMHG | HEART RATE: 67 BPM | OXYGEN SATURATION: 95 % | HEIGHT: 62 IN | TEMPERATURE: 97.9 F | RESPIRATION RATE: 16 BRPM

## 2020-05-26 DIAGNOSIS — I10 HYPERTENSION, UNSPECIFIED TYPE: Primary | ICD-10-CM

## 2020-05-26 DIAGNOSIS — E55.9 VITAMIN D DEFICIENCY: ICD-10-CM

## 2020-05-26 DIAGNOSIS — E03.9 HYPOTHYROIDISM (ACQUIRED): ICD-10-CM

## 2020-05-26 DIAGNOSIS — R73.9 HYPERGLYCEMIA: ICD-10-CM

## 2020-05-26 DIAGNOSIS — Z11.59 NEED FOR HEPATITIS C SCREENING TEST: ICD-10-CM

## 2020-05-26 DIAGNOSIS — E78.2 MIXED HYPERLIPIDEMIA: ICD-10-CM

## 2020-05-26 PROCEDURE — 3075F SYST BP GE 130 - 139MM HG: CPT | Performed by: FAMILY MEDICINE

## 2020-05-26 PROCEDURE — 3078F DIAST BP <80 MM HG: CPT | Performed by: FAMILY MEDICINE

## 2020-05-26 PROCEDURE — 1036F TOBACCO NON-USER: CPT | Performed by: FAMILY MEDICINE

## 2020-05-26 PROCEDURE — 99214 OFFICE O/P EST MOD 30 MIN: CPT | Performed by: FAMILY MEDICINE

## 2020-05-26 RX ORDER — HYDROCHLOROTHIAZIDE 25 MG/1
25 TABLET ORAL EVERY MORNING
Qty: 90 TABLET | Refills: 3 | Status: SHIPPED | OUTPATIENT
Start: 2020-05-26 | End: 2021-06-15 | Stop reason: SDUPTHER

## 2020-05-26 RX ORDER — LEVOTHYROXINE SODIUM 0.05 MG/1
50 TABLET ORAL DAILY
Qty: 90 TABLET | Refills: 3 | Status: SHIPPED | OUTPATIENT
Start: 2020-05-26 | End: 2021-08-10

## 2020-05-26 RX ORDER — OLMESARTAN MEDOXOMIL 40 MG/1
40 TABLET ORAL DAILY
Qty: 90 TABLET | Refills: 3 | Status: SHIPPED | OUTPATIENT
Start: 2020-05-26 | End: 2021-06-10 | Stop reason: SDUPTHER

## 2020-05-26 RX ORDER — OLMESARTAN MEDOXOMIL 40 MG/1
40 TABLET ORAL DAILY
Qty: 90 TABLET | Refills: 3 | Status: SHIPPED | OUTPATIENT
Start: 2020-05-26 | End: 2020-05-26 | Stop reason: SDUPTHER

## 2020-05-26 RX ORDER — ERGOCALCIFEROL 1.25 MG/1
50000 CAPSULE ORAL WEEKLY
Qty: 4 CAPSULE | Refills: 5 | Status: SHIPPED | OUTPATIENT
Start: 2020-05-26 | End: 2021-12-02 | Stop reason: ALTCHOICE

## 2020-05-26 RX ORDER — ROSUVASTATIN CALCIUM 5 MG/1
5 TABLET, COATED ORAL DAILY
Qty: 90 TABLET | Refills: 3 | Status: SHIPPED | OUTPATIENT
Start: 2020-05-26 | End: 2021-06-10 | Stop reason: SDUPTHER

## 2020-06-01 DIAGNOSIS — B34.9 VIRAL DISEASE: ICD-10-CM

## 2020-06-01 PROCEDURE — U0003 INFECTIOUS AGENT DETECTION BY NUCLEIC ACID (DNA OR RNA); SEVERE ACUTE RESPIRATORY SYNDROME CORONAVIRUS 2 (SARS-COV-2) (CORONAVIRUS DISEASE [COVID-19]), AMPLIFIED PROBE TECHNIQUE, MAKING USE OF HIGH THROUGHPUT TECHNOLOGIES AS DESCRIBED BY CMS-2020-01-R: HCPCS

## 2020-06-03 LAB
INPATIENT: NORMAL
SARS-COV-2 RNA SPEC QL NAA+PROBE: NOT DETECTED

## 2020-06-04 ENCOUNTER — ANESTHESIA EVENT (OUTPATIENT)
Dept: GASTROENTEROLOGY | Facility: HOSPITAL | Age: 65
End: 2020-06-04

## 2020-06-05 ENCOUNTER — ANESTHESIA (OUTPATIENT)
Dept: GASTROENTEROLOGY | Facility: HOSPITAL | Age: 65
End: 2020-06-05

## 2020-06-05 ENCOUNTER — HOSPITAL ENCOUNTER (OUTPATIENT)
Dept: GASTROENTEROLOGY | Facility: HOSPITAL | Age: 65
Setting detail: OUTPATIENT SURGERY
Discharge: HOME | End: 2020-06-05
Attending: COLON & RECTAL SURGERY | Admitting: COLON & RECTAL SURGERY
Payer: COMMERCIAL

## 2020-06-05 VITALS
DIASTOLIC BLOOD PRESSURE: 68 MMHG | BODY MASS INDEX: 39.93 KG/M2 | HEART RATE: 57 BPM | SYSTOLIC BLOOD PRESSURE: 128 MMHG | WEIGHT: 217 LBS | OXYGEN SATURATION: 95 % | TEMPERATURE: 98 F | HEIGHT: 62 IN | RESPIRATION RATE: 18 BRPM

## 2020-06-05 DIAGNOSIS — Z86.010 PERSONAL HISTORY OF COLONIC POLYPS: ICD-10-CM

## 2020-06-05 DIAGNOSIS — B34.9 VIRAL DISEASE: Primary | ICD-10-CM

## 2020-06-05 DIAGNOSIS — Z12.11 ENCOUNTER FOR SCREENING FOR MALIGNANT NEOPLASM OF COLON: ICD-10-CM

## 2020-06-05 PROCEDURE — 88305 TISSUE EXAM BY PATHOLOGIST: CPT | Performed by: PATHOLOGY

## 2020-06-05 RX ORDER — SODIUM CHLORIDE 9 MG/ML
125 INJECTION, SOLUTION INTRAVENOUS CONTINUOUS
Status: DISCONTINUED | OUTPATIENT
Start: 2020-06-05 | End: 2020-06-09 | Stop reason: HOSPADM

## 2020-06-05 RX ORDER — PROPOFOL 10 MG/ML
INJECTION, EMULSION INTRAVENOUS AS NEEDED
Status: DISCONTINUED | OUTPATIENT
Start: 2020-06-05 | End: 2020-06-05 | Stop reason: SURG

## 2020-06-05 RX ORDER — LIDOCAINE HYDROCHLORIDE 10 MG/ML
INJECTION, SOLUTION EPIDURAL; INFILTRATION; INTRACAUDAL; PERINEURAL AS NEEDED
Status: DISCONTINUED | OUTPATIENT
Start: 2020-06-05 | End: 2020-06-05 | Stop reason: SURG

## 2020-06-05 RX ADMIN — PROPOFOL 100 MG: 10 INJECTION, EMULSION INTRAVENOUS at 10:49

## 2020-06-05 RX ADMIN — LIDOCAINE HYDROCHLORIDE 50 MG: 10 INJECTION, SOLUTION EPIDURAL; INFILTRATION; INTRACAUDAL; PERINEURAL at 10:49

## 2020-06-05 RX ADMIN — PROPOFOL 50 MG: 10 INJECTION, EMULSION INTRAVENOUS at 10:55

## 2020-06-05 RX ADMIN — SODIUM CHLORIDE: 0.9 INJECTION, SOLUTION INTRAVENOUS at 10:30

## 2020-06-05 RX ADMIN — PROPOFOL 50 MG: 10 INJECTION, EMULSION INTRAVENOUS at 10:53

## 2020-06-05 RX ADMIN — PROPOFOL 50 MG: 10 INJECTION, EMULSION INTRAVENOUS at 10:50

## 2020-06-05 RX ADMIN — PROPOFOL 50 MG: 10 INJECTION, EMULSION INTRAVENOUS at 10:58

## 2020-06-30 ENCOUNTER — LAB (OUTPATIENT)
Dept: LAB | Facility: HOSPITAL | Age: 65
End: 2020-06-30
Payer: COMMERCIAL

## 2020-06-30 DIAGNOSIS — E78.2 MIXED HYPERLIPIDEMIA: ICD-10-CM

## 2020-06-30 DIAGNOSIS — I10 HYPERTENSION, UNSPECIFIED TYPE: ICD-10-CM

## 2020-06-30 DIAGNOSIS — R73.9 HYPERGLYCEMIA: ICD-10-CM

## 2020-06-30 DIAGNOSIS — E03.9 HYPOTHYROIDISM (ACQUIRED): ICD-10-CM

## 2020-06-30 DIAGNOSIS — Z11.4 ENCOUNTER FOR SCREENING FOR HIV: ICD-10-CM

## 2020-06-30 DIAGNOSIS — Z11.59 NEED FOR HEPATITIS C SCREENING TEST: ICD-10-CM

## 2020-06-30 LAB
ALBUMIN SERPL BCP-MCNC: 3.9 G/DL (ref 3–5.2)
ALP SERPL-CCNC: 71 U/L (ref 43–122)
ALT SERPL W P-5'-P-CCNC: 26 U/L (ref 9–52)
ANION GAP SERPL CALCULATED.3IONS-SCNC: 5 MMOL/L (ref 5–14)
AST SERPL W P-5'-P-CCNC: 28 U/L (ref 14–36)
BILIRUB SERPL-MCNC: 0.8 MG/DL
BUN SERPL-MCNC: 22 MG/DL (ref 5–25)
CALCIUM SERPL-MCNC: 9.1 MG/DL (ref 8.4–10.2)
CHLORIDE SERPL-SCNC: 106 MMOL/L (ref 97–108)
CHOLEST SERPL-MCNC: 182 MG/DL
CO2 SERPL-SCNC: 31 MMOL/L (ref 22–30)
CREAT SERPL-MCNC: 0.69 MG/DL (ref 0.6–1.2)
GFR SERPL CREATININE-BSD FRML MDRD: 106 ML/MIN/1.73SQ M
GLUCOSE 2H P 75 G GLC PO SERPL-MCNC: 53 MG/DL (ref 40–140)
GLUCOSE P FAST SERPL-MCNC: 109 MG/DL (ref 70–99)
GLUCOSE P FAST SERPL-MCNC: 110 MG/DL (ref 70–99)
GLUCOSE SERPL-MCNC: 147 MG/DL (ref 70–99)
HCV AB SER QL: NORMAL
HDLC SERPL-MCNC: 37 MG/DL
LDLC SERPL CALC-MCNC: 126 MG/DL
NONHDLC SERPL-MCNC: 145 MG/DL
POTASSIUM SERPL-SCNC: 4 MMOL/L (ref 3.6–5)
PROT SERPL-MCNC: 7.2 G/DL (ref 5.9–8.4)
SODIUM SERPL-SCNC: 142 MMOL/L (ref 137–147)
TRIGL SERPL-MCNC: 93 MG/DL
TSH SERPL DL<=0.05 MIU/L-ACNC: 1.8 UIU/ML (ref 0.47–4.68)

## 2020-06-30 PROCEDURE — 86803 HEPATITIS C AB TEST: CPT

## 2020-06-30 PROCEDURE — 80061 LIPID PANEL: CPT

## 2020-06-30 PROCEDURE — 82950 GLUCOSE TEST: CPT

## 2020-06-30 PROCEDURE — 36415 COLL VENOUS BLD VENIPUNCTURE: CPT

## 2020-06-30 PROCEDURE — 84443 ASSAY THYROID STIM HORMONE: CPT

## 2020-06-30 PROCEDURE — 87389 HIV-1 AG W/HIV-1&-2 AB AG IA: CPT

## 2020-06-30 PROCEDURE — 82947 ASSAY GLUCOSE BLOOD QUANT: CPT

## 2020-06-30 PROCEDURE — 80053 COMPREHEN METABOLIC PANEL: CPT

## 2020-07-01 LAB — HIV 1+2 AB+HIV1 P24 AG SERPL QL IA: NORMAL

## 2020-09-05 DIAGNOSIS — L25.9 CONTACT DERMATITIS, UNSPECIFIED CONTACT DERMATITIS TYPE, UNSPECIFIED TRIGGER: ICD-10-CM

## 2020-09-05 DIAGNOSIS — J30.1 SEASONAL ALLERGIC RHINITIS DUE TO POLLEN: ICD-10-CM

## 2020-09-08 RX ORDER — FEXOFENADINE HCL 180 MG/1
TABLET ORAL
Qty: 30 TABLET | Refills: 0 | Status: SHIPPED | OUTPATIENT
Start: 2020-09-08 | End: 2021-12-02 | Stop reason: ALTCHOICE

## 2020-10-07 ENCOUNTER — APPOINTMENT (EMERGENCY)
Dept: CT IMAGING | Facility: HOSPITAL | Age: 65
End: 2020-10-07
Payer: COMMERCIAL

## 2020-10-07 ENCOUNTER — HOSPITAL ENCOUNTER (EMERGENCY)
Facility: HOSPITAL | Age: 65
Discharge: HOME | End: 2020-10-08
Attending: EMERGENCY MEDICINE | Admitting: EMERGENCY MEDICINE
Payer: COMMERCIAL

## 2020-10-07 DIAGNOSIS — R05.9 COUGH: Primary | ICD-10-CM

## 2020-10-07 LAB
ALBUMIN SERPL BCP-MCNC: 4 G/DL (ref 3–5.2)
ALP SERPL-CCNC: 67 U/L (ref 43–122)
ALT SERPL W P-5'-P-CCNC: 21 U/L (ref 9–52)
ANION GAP SERPL CALCULATED.3IONS-SCNC: 4 MMOL/L (ref 5–14)
APTT PPP: 32 SECONDS (ref 23–37)
AST SERPL W P-5'-P-CCNC: 37 U/L (ref 14–36)
BASOPHILS # BLD AUTO: 0.1 THOUSANDS/ΜL (ref 0–0.1)
BASOPHILS NFR BLD AUTO: 1 % (ref 0–1)
BILIRUB SERPL-MCNC: 0.6 MG/DL
BUN SERPL-MCNC: 19 MG/DL (ref 5–25)
CALCIUM SERPL-MCNC: 9.1 MG/DL (ref 8.4–10.2)
CHLORIDE SERPL-SCNC: 103 MMOL/L (ref 97–108)
CO2 SERPL-SCNC: 31 MMOL/L (ref 22–30)
CREAT SERPL-MCNC: 0.71 MG/DL (ref 0.6–1.2)
EOSINOPHIL # BLD AUTO: 0.2 THOUSAND/ΜL (ref 0–0.4)
EOSINOPHIL NFR BLD AUTO: 2 % (ref 0–6)
ERYTHROCYTE [DISTWIDTH] IN BLOOD BY AUTOMATED COUNT: 14.4 %
GFR SERPL CREATININE-BSD FRML MDRD: 104 ML/MIN/1.73SQ M
GLUCOSE SERPL-MCNC: 105 MG/DL (ref 70–99)
HCT VFR BLD AUTO: 43.4 % (ref 36–46)
HGB BLD-MCNC: 14.4 G/DL (ref 12–16)
INR PPP: 0.95 (ref 0.84–1.19)
LYMPHOCYTES # BLD AUTO: 4 THOUSANDS/ΜL (ref 0.5–4)
LYMPHOCYTES NFR BLD AUTO: 36 % (ref 25–45)
MCH RBC QN AUTO: 26.5 PG (ref 26–34)
MCHC RBC AUTO-ENTMCNC: 33.3 G/DL (ref 31–36)
MCV RBC AUTO: 80 FL (ref 80–100)
MONOCYTES # BLD AUTO: 1 THOUSAND/ΜL (ref 0.2–0.9)
MONOCYTES NFR BLD AUTO: 9 % (ref 1–10)
NEUTROPHILS # BLD AUTO: 5.7 THOUSANDS/ΜL (ref 1.8–7.8)
NEUTS SEG NFR BLD AUTO: 51 % (ref 45–65)
NT-PROBNP SERPL-MCNC: 27.8 PG/ML (ref 0–299)
PLATELET # BLD AUTO: 290 THOUSANDS/UL (ref 150–450)
PMV BLD AUTO: 9.5 FL (ref 8.9–12.7)
POTASSIUM SERPL-SCNC: 4.1 MMOL/L (ref 3.6–5)
PROT SERPL-MCNC: 7.6 G/DL (ref 5.9–8.4)
PROTHROMBIN TIME: 12.7 SECONDS (ref 11.6–14.5)
RBC # BLD AUTO: 5.45 MILLION/UL (ref 4–5.2)
SARS-COV-2 RNA RESP QL NAA+PROBE: NEGATIVE
SODIUM SERPL-SCNC: 138 MMOL/L (ref 137–147)
TROPONIN I SERPL-MCNC: <0.01 NG/ML (ref 0–0.03)
WBC # BLD AUTO: 11.1 THOUSAND/UL (ref 4.5–11)

## 2020-10-07 PROCEDURE — 36415 COLL VENOUS BLD VENIPUNCTURE: CPT | Performed by: PHYSICIAN ASSISTANT

## 2020-10-07 PROCEDURE — 99284 EMERGENCY DEPT VISIT MOD MDM: CPT | Performed by: PHYSICIAN ASSISTANT

## 2020-10-07 PROCEDURE — 71275 CT ANGIOGRAPHY CHEST: CPT

## 2020-10-07 PROCEDURE — 85025 COMPLETE CBC W/AUTO DIFF WBC: CPT | Performed by: PHYSICIAN ASSISTANT

## 2020-10-07 PROCEDURE — 85610 PROTHROMBIN TIME: CPT | Performed by: PHYSICIAN ASSISTANT

## 2020-10-07 PROCEDURE — 80053 COMPREHEN METABOLIC PANEL: CPT | Performed by: PHYSICIAN ASSISTANT

## 2020-10-07 PROCEDURE — 99285 EMERGENCY DEPT VISIT HI MDM: CPT

## 2020-10-07 PROCEDURE — 93005 ELECTROCARDIOGRAM TRACING: CPT

## 2020-10-07 PROCEDURE — G1004 CDSM NDSC: HCPCS

## 2020-10-07 PROCEDURE — 87635 SARS-COV-2 COVID-19 AMP PRB: CPT | Performed by: PHYSICIAN ASSISTANT

## 2020-10-07 PROCEDURE — 85730 THROMBOPLASTIN TIME PARTIAL: CPT | Performed by: PHYSICIAN ASSISTANT

## 2020-10-07 PROCEDURE — 84484 ASSAY OF TROPONIN QUANT: CPT | Performed by: PHYSICIAN ASSISTANT

## 2020-10-07 PROCEDURE — 83880 ASSAY OF NATRIURETIC PEPTIDE: CPT | Performed by: PHYSICIAN ASSISTANT

## 2020-10-07 RX ORDER — ALBUTEROL SULFATE 90 UG/1
2 AEROSOL, METERED RESPIRATORY (INHALATION) ONCE
Status: COMPLETED | OUTPATIENT
Start: 2020-10-08 | End: 2020-10-07

## 2020-10-07 RX ADMIN — IOHEXOL 85 ML: 350 INJECTION, SOLUTION INTRAVENOUS at 23:05

## 2020-10-07 RX ADMIN — ALBUTEROL SULFATE 2 PUFF: 90 AEROSOL, METERED RESPIRATORY (INHALATION) at 23:59

## 2020-10-08 ENCOUNTER — TELEMEDICINE (OUTPATIENT)
Dept: FAMILY MEDICINE CLINIC | Facility: CLINIC | Age: 65
End: 2020-10-08
Payer: COMMERCIAL

## 2020-10-08 ENCOUNTER — NURSE TRIAGE (OUTPATIENT)
Dept: OTHER | Facility: OTHER | Age: 65
End: 2020-10-08

## 2020-10-08 VITALS
SYSTOLIC BLOOD PRESSURE: 130 MMHG | WEIGHT: 222.9 LBS | OXYGEN SATURATION: 97 % | RESPIRATION RATE: 17 BRPM | HEART RATE: 79 BPM | DIASTOLIC BLOOD PRESSURE: 74 MMHG | BODY MASS INDEX: 40.77 KG/M2 | TEMPERATURE: 96.9 F

## 2020-10-08 DIAGNOSIS — U07.1 COVID-19: Primary | ICD-10-CM

## 2020-10-08 DIAGNOSIS — U07.1 COVID-19: ICD-10-CM

## 2020-10-08 DIAGNOSIS — Z20.822 EXPOSURE TO COVID-19 VIRUS: Primary | ICD-10-CM

## 2020-10-08 LAB
ATRIAL RATE: 60 BPM
P AXIS: 67 DEGREES
PR INTERVAL: 210 MS
QRS AXIS: 48 DEGREES
QRSD INTERVAL: 84 MS
QT INTERVAL: 434 MS
QTC INTERVAL: 434 MS
T WAVE AXIS: 38 DEGREES
VENTRICULAR RATE: 60 BPM

## 2020-10-08 PROCEDURE — 99213 OFFICE O/P EST LOW 20 MIN: CPT | Performed by: NURSE PRACTITIONER

## 2020-10-08 PROCEDURE — U0003 INFECTIOUS AGENT DETECTION BY NUCLEIC ACID (DNA OR RNA); SEVERE ACUTE RESPIRATORY SYNDROME CORONAVIRUS 2 (SARS-COV-2) (CORONAVIRUS DISEASE [COVID-19]), AMPLIFIED PROBE TECHNIQUE, MAKING USE OF HIGH THROUGHPUT TECHNOLOGIES AS DESCRIBED BY CMS-2020-01-R: HCPCS | Performed by: FAMILY MEDICINE

## 2020-10-08 PROCEDURE — 93010 ELECTROCARDIOGRAM REPORT: CPT

## 2020-10-08 RX ORDER — BENZONATATE 100 MG/1
100 CAPSULE ORAL EVERY 8 HOURS
Qty: 21 CAPSULE | Refills: 0 | Status: SHIPPED | OUTPATIENT
Start: 2020-10-08 | End: 2020-10-16 | Stop reason: SDUPTHER

## 2020-10-09 LAB — SARS-COV-2 RNA SPEC QL NAA+PROBE: NOT DETECTED

## 2020-10-12 ENCOUNTER — TELEPHONE (OUTPATIENT)
Dept: OTHER | Facility: OTHER | Age: 65
End: 2020-10-12

## 2020-10-16 ENCOUNTER — HOSPITAL ENCOUNTER (OUTPATIENT)
Dept: RADIOLOGY | Facility: HOSPITAL | Age: 65
Discharge: HOME | End: 2020-10-16
Payer: COMMERCIAL

## 2020-10-16 ENCOUNTER — OFFICE VISIT (OUTPATIENT)
Dept: FAMILY MEDICINE CLINIC | Facility: CLINIC | Age: 65
End: 2020-10-16
Payer: COMMERCIAL

## 2020-10-16 VITALS
TEMPERATURE: 98.7 F | OXYGEN SATURATION: 98 % | SYSTOLIC BLOOD PRESSURE: 132 MMHG | HEART RATE: 76 BPM | BODY MASS INDEX: 41.22 KG/M2 | DIASTOLIC BLOOD PRESSURE: 80 MMHG | HEIGHT: 62 IN | WEIGHT: 224 LBS | RESPIRATION RATE: 18 BRPM

## 2020-10-16 DIAGNOSIS — R05.9 COUGH: ICD-10-CM

## 2020-10-16 DIAGNOSIS — R07.89 COSTOCHONDRAL CHEST PAIN: ICD-10-CM

## 2020-10-16 DIAGNOSIS — K29.60 OTHER GASTRITIS WITHOUT HEMORRHAGE, UNSPECIFIED CHRONICITY: ICD-10-CM

## 2020-10-16 DIAGNOSIS — J45.909 MILD ASTHMA WITHOUT COMPLICATION, UNSPECIFIED WHETHER PERSISTENT: ICD-10-CM

## 2020-10-16 DIAGNOSIS — R05.9 COUGH: Primary | ICD-10-CM

## 2020-10-16 PROBLEM — K29.70 GASTRITIS WITHOUT BLEEDING: Status: ACTIVE | Noted: 2020-10-16

## 2020-10-16 PROCEDURE — 99214 OFFICE O/P EST MOD 30 MIN: CPT | Performed by: NURSE PRACTITIONER

## 2020-10-16 PROCEDURE — 71046 X-RAY EXAM CHEST 2 VIEWS: CPT

## 2020-10-16 RX ORDER — BENZONATATE 100 MG/1
100 CAPSULE ORAL EVERY 8 HOURS
Qty: 21 CAPSULE | Refills: 0 | Status: SHIPPED | OUTPATIENT
Start: 2020-10-16 | End: 2020-10-16 | Stop reason: SDUPTHER

## 2020-10-16 RX ORDER — AZITHROMYCIN 250 MG/1
TABLET, FILM COATED ORAL
Qty: 6 TABLET | Refills: 0 | Status: SHIPPED | OUTPATIENT
Start: 2020-10-16 | End: 2020-10-20

## 2020-10-16 RX ORDER — BENZONATATE 100 MG/1
100 CAPSULE ORAL EVERY 8 HOURS
Qty: 21 CAPSULE | Refills: 0 | Status: SHIPPED | OUTPATIENT
Start: 2020-10-16 | End: 2020-12-01 | Stop reason: ALTCHOICE

## 2020-10-16 RX ORDER — PREDNISONE 20 MG/1
20 TABLET ORAL DAILY
Qty: 5 TABLET | Refills: 0 | Status: SHIPPED | OUTPATIENT
Start: 2020-10-16 | End: 2020-12-01 | Stop reason: ALTCHOICE

## 2020-10-16 RX ORDER — PANTOPRAZOLE SODIUM 40 MG/1
40 TABLET, DELAYED RELEASE ORAL DAILY
Qty: 30 TABLET | Refills: 5 | Status: SHIPPED | OUTPATIENT
Start: 2020-10-16 | End: 2021-12-02 | Stop reason: SDUPTHER

## 2020-10-21 ENCOUNTER — TELEPHONE (OUTPATIENT)
Dept: FAMILY MEDICINE CLINIC | Facility: CLINIC | Age: 65
End: 2020-10-21

## 2020-10-21 DIAGNOSIS — M25.561 ACUTE PAIN OF RIGHT KNEE: Primary | ICD-10-CM

## 2020-10-21 RX ORDER — CYCLOBENZAPRINE HCL 10 MG
10 TABLET ORAL
Qty: 30 TABLET | Refills: 0 | Status: SHIPPED | OUTPATIENT
Start: 2020-10-21 | End: 2020-12-01 | Stop reason: ALTCHOICE

## 2020-11-30 RX ORDER — INDOMETHACIN 50 MG/1
CAPSULE ORAL
COMMUNITY
Start: 2020-10-20 | End: 2020-12-01 | Stop reason: ALTCHOICE

## 2020-12-01 ENCOUNTER — OFFICE VISIT (OUTPATIENT)
Dept: FAMILY MEDICINE CLINIC | Facility: CLINIC | Age: 65
End: 2020-12-01
Payer: COMMERCIAL

## 2020-12-01 VITALS
HEART RATE: 75 BPM | RESPIRATION RATE: 16 BRPM | HEIGHT: 62 IN | BODY MASS INDEX: 40.48 KG/M2 | DIASTOLIC BLOOD PRESSURE: 70 MMHG | OXYGEN SATURATION: 96 % | SYSTOLIC BLOOD PRESSURE: 120 MMHG | TEMPERATURE: 97.9 F | WEIGHT: 220 LBS

## 2020-12-01 DIAGNOSIS — Z23 NEED FOR VACCINATION FOR PNEUMOCOCCUS: ICD-10-CM

## 2020-12-01 DIAGNOSIS — D72.829 LEUKOCYTOSIS, UNSPECIFIED TYPE: ICD-10-CM

## 2020-12-01 DIAGNOSIS — R79.89 ELEVATED LIVER FUNCTION TESTS: ICD-10-CM

## 2020-12-01 DIAGNOSIS — Z00.01 ENCOUNTER FOR GENERAL ADULT MEDICAL EXAMINATION WITH ABNORMAL FINDINGS: Primary | ICD-10-CM

## 2020-12-01 DIAGNOSIS — R73.03 PRE-DIABETES: ICD-10-CM

## 2020-12-01 DIAGNOSIS — E03.9 HYPOTHYROIDISM (ACQUIRED): ICD-10-CM

## 2020-12-01 PROBLEM — N39.0 URINARY TRACT INFECTION WITHOUT HEMATURIA: Status: RESOLVED | Noted: 2019-11-21 | Resolved: 2020-12-01

## 2020-12-01 PROBLEM — Z20.822 EXPOSURE TO COVID-19 VIRUS: Status: RESOLVED | Noted: 2020-10-08 | Resolved: 2020-12-01

## 2020-12-01 PROBLEM — K29.70 GASTRITIS WITHOUT BLEEDING: Status: RESOLVED | Noted: 2020-10-16 | Resolved: 2020-12-01

## 2020-12-01 PROBLEM — R07.89 COSTOCHONDRAL CHEST PAIN: Status: RESOLVED | Noted: 2020-10-16 | Resolved: 2020-12-01

## 2020-12-01 PROBLEM — R42 DIZZINESS: Status: RESOLVED | Noted: 2019-09-30 | Resolved: 2020-12-01

## 2020-12-01 PROBLEM — R05.9 COUGH: Status: RESOLVED | Noted: 2020-10-16 | Resolved: 2020-12-01

## 2020-12-01 PROBLEM — M25.561 ACUTE PAIN OF RIGHT KNEE: Status: RESOLVED | Noted: 2019-06-20 | Resolved: 2020-12-01

## 2020-12-01 PROCEDURE — 99396 PREV VISIT EST AGE 40-64: CPT | Performed by: FAMILY MEDICINE

## 2020-12-01 PROCEDURE — 90732 PPSV23 VACC 2 YRS+ SUBQ/IM: CPT

## 2020-12-01 PROCEDURE — 90471 IMMUNIZATION ADMIN: CPT

## 2020-12-19 ENCOUNTER — IMMUNIZATIONS (OUTPATIENT)
Dept: FAMILY MEDICINE CLINIC | Facility: HOSPITAL | Age: 65
End: 2020-12-19
Payer: COMMERCIAL

## 2020-12-19 DIAGNOSIS — Z23 ENCOUNTER FOR IMMUNIZATION: ICD-10-CM

## 2020-12-19 PROCEDURE — 91300 SARS-COV-2 / COVID-19 MRNA VACCINE (PFIZER-BIONTECH) 30 MCG: CPT

## 2020-12-19 PROCEDURE — 0001A SARS-COV-2 / COVID-19 MRNA VACCINE (PFIZER-BIONTECH) 30 MCG: CPT

## 2021-01-07 ENCOUNTER — IMMUNIZATIONS (OUTPATIENT)
Dept: FAMILY MEDICINE CLINIC | Facility: HOSPITAL | Age: 66
End: 2021-01-07

## 2021-01-07 DIAGNOSIS — Z23 ENCOUNTER FOR IMMUNIZATION: ICD-10-CM

## 2021-01-07 PROCEDURE — 91300 SARS-COV-2 / COVID-19 MRNA VACCINE (PFIZER-BIONTECH) 30 MCG: CPT

## 2021-01-07 PROCEDURE — 0002A SARS-COV-2 / COVID-19 MRNA VACCINE (PFIZER-BIONTECH) 30 MCG: CPT

## 2021-01-08 ENCOUNTER — APPOINTMENT (EMERGENCY)
Dept: RADIOLOGY | Facility: HOSPITAL | Age: 66
End: 2021-01-08
Payer: COMMERCIAL

## 2021-01-08 ENCOUNTER — APPOINTMENT (EMERGENCY)
Dept: CT IMAGING | Facility: HOSPITAL | Age: 66
End: 2021-01-08
Payer: COMMERCIAL

## 2021-01-08 ENCOUNTER — HOSPITAL ENCOUNTER (EMERGENCY)
Facility: HOSPITAL | Age: 66
Discharge: HOME | End: 2021-01-09
Attending: EMERGENCY MEDICINE | Admitting: EMERGENCY MEDICINE
Payer: COMMERCIAL

## 2021-01-08 DIAGNOSIS — B34.9 VIRAL ILLNESS: Primary | ICD-10-CM

## 2021-01-08 LAB
ALBUMIN SERPL BCP-MCNC: 3.6 G/DL (ref 3–5.2)
ALP SERPL-CCNC: 77 U/L (ref 43–122)
ALT SERPL W P-5'-P-CCNC: 17 U/L (ref 9–52)
ANION GAP SERPL CALCULATED.3IONS-SCNC: 6 MMOL/L (ref 5–14)
AST SERPL W P-5'-P-CCNC: 25 U/L (ref 14–36)
BASOPHILS # BLD AUTO: 0.1 THOUSANDS/ΜL (ref 0–0.1)
BASOPHILS NFR BLD AUTO: 1 % (ref 0–1)
BILIRUB SERPL-MCNC: 0.5 MG/DL
BUN SERPL-MCNC: 16 MG/DL (ref 5–25)
CALCIUM SERPL-MCNC: 9 MG/DL (ref 8.4–10.2)
CHLORIDE SERPL-SCNC: 102 MMOL/L (ref 97–108)
CO2 SERPL-SCNC: 29 MMOL/L (ref 22–30)
CREAT SERPL-MCNC: 0.9 MG/DL (ref 0.6–1.2)
D DIMER PPP FEU-MCNC: 0.92 UG/ML FEU
EOSINOPHIL # BLD AUTO: 0.1 THOUSAND/ΜL (ref 0–0.4)
EOSINOPHIL NFR BLD AUTO: 1 % (ref 0–6)
ERYTHROCYTE [DISTWIDTH] IN BLOOD BY AUTOMATED COUNT: 14.3 %
GFR SERPL CREATININE-BSD FRML MDRD: 78 ML/MIN/1.73SQ M
GLUCOSE SERPL-MCNC: 154 MG/DL (ref 70–99)
HCT VFR BLD AUTO: 40.4 % (ref 36–46)
HGB BLD-MCNC: 13.4 G/DL (ref 12–16)
LYMPHOCYTES # BLD AUTO: 2.4 THOUSANDS/ΜL (ref 0.5–4)
LYMPHOCYTES NFR BLD AUTO: 29 % (ref 25–45)
MCH RBC QN AUTO: 26.6 PG (ref 26–34)
MCHC RBC AUTO-ENTMCNC: 33.3 G/DL (ref 31–36)
MCV RBC AUTO: 80 FL (ref 80–100)
MICROCYTES BLD QL AUTO: PRESENT
MONOCYTES # BLD AUTO: 0.8 THOUSAND/ΜL (ref 0.2–0.9)
MONOCYTES NFR BLD AUTO: 9 % (ref 1–10)
NEUTROPHILS # BLD AUTO: 5.1 THOUSANDS/ΜL (ref 1.8–7.8)
NEUTS SEG NFR BLD AUTO: 60 % (ref 45–65)
PLATELET # BLD AUTO: 251 THOUSANDS/UL (ref 150–450)
PLATELET BLD QL SMEAR: ADEQUATE
PMV BLD AUTO: 9.2 FL (ref 8.9–12.7)
POTASSIUM SERPL-SCNC: 3.5 MMOL/L (ref 3.6–5)
PROT SERPL-MCNC: 6.8 G/DL (ref 5.9–8.4)
RBC # BLD AUTO: 5.06 MILLION/UL (ref 4–5.2)
RBC MORPH BLD: NORMAL
SODIUM SERPL-SCNC: 137 MMOL/L (ref 137–147)
TROPONIN I SERPL-MCNC: <0.01 NG/ML (ref 0–0.03)
WBC # BLD AUTO: 8.5 THOUSAND/UL (ref 4.5–11)

## 2021-01-08 PROCEDURE — 85379 FIBRIN DEGRADATION QUANT: CPT | Performed by: PHYSICIAN ASSISTANT

## 2021-01-08 PROCEDURE — 99284 EMERGENCY DEPT VISIT MOD MDM: CPT

## 2021-01-08 PROCEDURE — 71045 X-RAY EXAM CHEST 1 VIEW: CPT

## 2021-01-08 PROCEDURE — 85025 COMPLETE CBC W/AUTO DIFF WBC: CPT | Performed by: PHYSICIAN ASSISTANT

## 2021-01-08 PROCEDURE — 36415 COLL VENOUS BLD VENIPUNCTURE: CPT | Performed by: PHYSICIAN ASSISTANT

## 2021-01-08 PROCEDURE — 99285 EMERGENCY DEPT VISIT HI MDM: CPT | Performed by: PHYSICIAN ASSISTANT

## 2021-01-08 PROCEDURE — 80053 COMPREHEN METABOLIC PANEL: CPT | Performed by: PHYSICIAN ASSISTANT

## 2021-01-08 PROCEDURE — 87637 SARSCOV2&INF A&B&RSV AMP PRB: CPT | Performed by: PHYSICIAN ASSISTANT

## 2021-01-08 PROCEDURE — 84484 ASSAY OF TROPONIN QUANT: CPT | Performed by: PHYSICIAN ASSISTANT

## 2021-01-08 PROCEDURE — 93005 ELECTROCARDIOGRAM TRACING: CPT

## 2021-01-08 PROCEDURE — 71275 CT ANGIOGRAPHY CHEST: CPT

## 2021-01-08 PROCEDURE — 94640 AIRWAY INHALATION TREATMENT: CPT

## 2021-01-08 PROCEDURE — G1004 CDSM NDSC: HCPCS

## 2021-01-08 RX ORDER — BENZONATATE 100 MG/1
100 CAPSULE ORAL EVERY 8 HOURS
Qty: 21 CAPSULE | Refills: 0 | Status: SHIPPED | OUTPATIENT
Start: 2021-01-08 | End: 2021-01-13 | Stop reason: SDUPTHER

## 2021-01-08 RX ORDER — IPRATROPIUM BROMIDE AND ALBUTEROL SULFATE 2.5; .5 MG/3ML; MG/3ML
3 SOLUTION RESPIRATORY (INHALATION)
Status: DISCONTINUED | OUTPATIENT
Start: 2021-01-08 | End: 2021-01-09 | Stop reason: HOSPADM

## 2021-01-08 RX ADMIN — IOHEXOL 85 ML: 350 INJECTION, SOLUTION INTRAVENOUS at 22:37

## 2021-01-08 RX ADMIN — IPRATROPIUM BROMIDE AND ALBUTEROL SULFATE 3 ML: 2.5; .5 SOLUTION RESPIRATORY (INHALATION) at 20:57

## 2021-01-08 NOTE — Clinical Note
Bharathi Torrez was seen and treated in our emergency department on 1/8/2021  Diagnosis:     Lizzy Medellin  may return to work on return date  She may return on this date: 01/12/2021    Pending negative result      If you have any questions or concerns, please don't hesitate to call        Sung Ratliff PA-C    ______________________________           _______________          _______________  Hospital Representative                              Date                                Time

## 2021-01-09 VITALS
WEIGHT: 219.5 LBS | BODY MASS INDEX: 40.15 KG/M2 | TEMPERATURE: 98.2 F | SYSTOLIC BLOOD PRESSURE: 133 MMHG | RESPIRATION RATE: 16 BRPM | DIASTOLIC BLOOD PRESSURE: 68 MMHG | OXYGEN SATURATION: 97 % | HEART RATE: 62 BPM

## 2021-01-09 NOTE — ED PROVIDER NOTES
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 all day and found to be around 91/92%  Use albuterol inhaler with mild relief and improvement of saturation 95% however continues to be concerned regarding low saturation and SOB  Tells me that chest pain is mild substernal and has been occurring for 2-3 days and is not worse with breathing  Does not radiate to the back  Denies any other complaints  Asthma  Associated symptoms: chest pain, myalgias and shortness of breath    Associated symptoms: no abdominal pain, no cough, no ear pain, no fatigue, no nausea, no rash, no sore throat and no vomiting        Prior to Admission Medications   Prescriptions Last Dose Informant Patient Reported? Taking?    Lancets (ONETOUCH ULTRASOFT) lancets   No No   Sig: Check blood glucose 3 times a day   Multiple Vitamins-Minerals (MULTIVITAMIN ADULT PO)   Yes No   Si tablet every 24 hours   Ventolin  (90 Base) MCG/ACT inhaler   No No   Sig: Inhale 2 puffs every 6 (six) hours as needed for wheezing   albuterol (2 5 mg/3 mL) 0 083 % nebulizer solution   No No   Sig: Take 1 vial (2 5 mg total) by nebulization every 6 (six) hours as needed for wheezing or shortness of breath   diclofenac sodium (VOLTAREN) 1 %   Yes No   ergocalciferol (VITAMIN D2) 50,000 units   No No   Sig: Take 1 capsule (50,000 Units total) by mouth once a week Take one capsule weekly by mouth   fexofenadine (ALLEGRA) 180 MG tablet   No No   Sig: TAKE 1 TABLET BY MOUTH DAILY   fluticasone (FLONASE) 50 mcg/act nasal spray   No No   Si sprays into each nostril daily   Patient taking differently: 2 sprays into each nostril daily as needed    glucose blood test strip   No No   Sig: Checks blood glucose 3 times a day     hydrochlorothiazide (HYDRODIURIL) 25 mg tablet   No No   Sig: Take 1 tablet (25 mg total) by mouth every morning   levothyroxine (Synthroid) 50 mcg tablet   No No   Sig: Take 1 tablet (50 mcg total) by mouth daily   olmesartan (Benicar) 40 mg tablet   No No   Sig: Take 1 tablet (40 mg total) by mouth daily   pantoprazole (PROTONIX) 40 mg tablet   No No   Sig: Take 1 tablet (40 mg total) by mouth daily   rosuvastatin (CRESTOR) 5 mg tablet   No No   Sig: Take 1 tablet (5 mg total) by mouth daily      Facility-Administered Medications: None       Past Medical History:   Diagnosis Date    Asthma     Colon polyp     Diabetes ([facility] )     type 2    Diabetes mellitus ([facility] )     Disease of thyroid gland     GERD (gastroesophageal reflux disease)     Hyperlipidemia     Hypertension     Obesity     Seasonal allergies     Thyroid disease     Tinnitus        Past Surgical History:   Procedure Laterality Date     SECTION      Triplets    COLONOSCOPY      COLONOSCOPY W/ POLYPECTOMY  2020    HYSTERECTOMY      REDUCTION MAMMAPLASTY         Family History   Problem Relation Age of Onset    Coronary artery disease Maternal Grandfather     Breast cancer Maternal Aunt 62    Stroke Mother     No Known Problems Father     No Known Problems Sister     No Known Problems Daughter     No Known Problems Maternal Grandmother     No Known Problems Paternal Grandmother     No Known Problems Paternal Grandfather     No Known Problems Daughter     No Known Problems Daughter     No Known Problems Sister     No Known Problems Sister     No Known Problems Sister     No Known Problems Sister     No Known Problems Sister     No Known Problems Sister     No Known Problems Maternal Aunt     No Known Problems Maternal Aunt     No Known Problems Maternal Aunt     No Known Problems Maternal Aunt     No Known Problems Maternal Aunt     No Known Problems Paternal Aunt     No Known Problems Paternal Aunt      I have reviewed and agree with the history as documented  E-Cigarette/Vaping    E-Cigarette Use Never User      E-Cigarette/Vaping Substances    Nicotine No     THC No     CBD No     Flavoring No     Other No      Social History     Tobacco Use    Smoking status: Former Smoker     Quit date: 3/18/2010     Years since quitting: 10 8    Smokeless tobacco: Never Used   Substance Use Topics    Alcohol use: Yes     Alcohol/week: 4 0 standard drinks     Types: 4 Glasses of wine per week     Frequency: Monthly or less     Drinks per session: 1 or 2     Binge frequency: Never    Drug use: No       Review of Systems   Constitutional: Negative  Negative for chills and fatigue  HENT: Negative for ear pain and sore throat  Eyes: Negative for photophobia and redness  Respiratory: Positive for shortness of breath  Negative for apnea and cough  "tight sensation" pt describes as similar to prior asthma flares    Cardiovascular: Positive for chest pain  Gastrointestinal: Negative for abdominal pain, nausea and vomiting  Genitourinary: Negative for dysuria  Musculoskeletal: Positive for myalgias  Negative for arthralgias, neck pain and neck stiffness  Skin: Negative for rash  Neurological: Negative for dizziness, tremors, syncope and weakness  Psychiatric/Behavioral: Negative for suicidal ideas  Physical Exam  Physical Exam  Constitutional:       General: She is not in acute distress  Appearance: She is well-developed  She is not diaphoretic  Eyes:      Pupils: Pupils are equal, round, and reactive to light  Neck:      Musculoskeletal: Normal range of motion and neck supple     Cardiovascular:      Rate and Rhythm: Normal rate and regular rhythm  Pulmonary:      Effort: Pulmonary effort is normal  No respiratory distress  Breath sounds: Normal breath sounds  Comments: Normal resp effort without obvious wheezes rhonchi or rales  No resp distress  Abdominal:      General: Bowel sounds are normal  There is no distension  Palpations: Abdomen is soft  Musculoskeletal: Normal range of motion  Skin:     General: Skin is warm and dry  Neurological:      Mental Status: She is alert and oriented to person, place, and time           Vital Signs  ED Triage Vitals   Temperature Pulse Respirations Blood Pressure SpO2   01/08/21 2031 01/08/21 2031 01/08/21 2031 01/08/21 2033 01/08/21 2031   98 2 °F (36 8 °C) 81 20 149/80 95 %      Temp Source Heart Rate Source Patient Position - Orthostatic VS BP Location FiO2 (%)   01/08/21 2031 01/08/21 2031 01/08/21 2031 01/08/21 2031 --   Tympanic Monitor Sitting Left arm       Pain Score       --                  Vitals:    01/08/21 2033 01/08/21 2123 01/08/21 2145 01/08/21 2244   BP: 149/80  118/82 118/62   Pulse:  74 83 68   Patient Position - Orthostatic VS:   Sitting Lying         Visual Acuity      ED Medications  Medications   ipratropium-albuterol (DUO-NEB) 0 5-2 5 mg/3 mL inhalation solution 3 mL (3 mL Nebulization Given 1/8/21 2057)   iohexol (OMNIPAQUE) 350 MG/ML injection (SINGLE-DOSE) 85 mL (85 mL Intravenous Given 1/8/21 2237)       Diagnostic Studies  Results Reviewed     Procedure Component Value Units Date/Time    D-dimer, quantitative [[id number]]  (Abnormal) Collected: 01/08/21 2145    Lab Status: Final result Specimen: Blood from Arm, Right Updated: 01/08/21 2205     D-Dimer, Quant 0 92 ug/ml FEU     Troponin I [[id number]]  (Normal) Collected: 01/08/21 2054    Lab Status: Final result Specimen: Blood from Arm, Right Updated: 01/08/21 2122     Troponin I <0 01 ng/mL     Smear Review(Phlebs Do Not Order) [[id number]] Collected: 01/08/21 2054    Lab Status: Final result Specimen: Blood from Arm, Right Updated: 01/08/21 2122     RBC Morphology abnormal     Microcytes Present     Platelet Estimate Adequate    Comprehensive metabolic panel [[id number]]  (Abnormal) Collected: 01/08/21 2054    Lab Status: Final result Specimen: Blood from Arm, Right Updated: 01/08/21 2110     Sodium 137 mmol/L      Potassium 3 5 mmol/L      Chloride 102 mmol/L      CO2 29 mmol/L      ANION GAP 6 mmol/L      BUN 16 mg/dL      Creatinine 0 90 mg/dL      Glucose 154 mg/dL      Calcium 9 0 mg/dL      AST 25 U/L      ALT 17 U/L      Alkaline Phosphatase 77 U/L      Total Protein 6 8 g/dL      Albumin 3 6 g/dL      Total Bilirubin 0 50 mg/dL      eGFR 78 ml/min/1 73sq m     Narrative:      [place] guidelines for Chronic Kidney Disease (CKD):     Stage 1 with normal or high GFR (GFR > 90 mL/min/1 73 square meters)    Stage 2 Mild CKD (GFR = 60-89 mL/min/1 73 square meters)    Stage 3A Moderate CKD (GFR = 45-59 mL/min/1 73 square meters)    Stage 3B Moderate CKD (GFR = 30-44 mL/min/1 73 square meters)    Stage 4 Severe CKD (GFR = 15-29 mL/min/1 73 square meters)    Stage 5 End Stage CKD (GFR <15 mL/min/1 73 square meters)  Note: GFR calculation is accurate only with a steady state creatinine    CBC and differential [[id number]]  (Normal) Collected: 01/08/21 2054    Lab Status: Final result Specimen: Blood from Arm, Right Updated: 01/08/21 2104     WBC 8 50 Thousand/uL      RBC 5 06 Million/uL      Hemoglobin 13 4 g/dL      Hematocrit 40 4 %      MCV 80 fL      MCH 26 6 pg      MCHC 33 3 g/dL      RDW 14 3 %      MPV 9 2 fL      Platelets 167 Thousands/uL      Neutrophils Relative 60 %      Lymphocytes Relative 29 %      Monocytes Relative 9 %      Eosinophils Relative 1 %      Basophils Relative 1 %      Neutrophils Absolute 5 10 Thousands/µL      Lymphocytes Absolute 2 40 Thousands/µL      Monocytes Absolute 0 80 Thousand/µL      Eosinophils Absolute 0 10 Thousand/µL      Basophils Absolute 0 10 Thousands/µL     Novel Coronavirus 2019(COVID-19), Influenza A/B, RSV [facility] [[id number]] Collected: 01/08/21 2056    Lab Status: In process Specimen: Nasopharyngeal Swab Updated: 01/08/21 2101                 CTA ED chest PE Study   Final Result by Digna Cohn DO (01/08 5643)      No pulmonary embolus or acute chest process is seen  Mild scattered pulmonary emphysematous changes  Prominence of the central pulmonary arteries may suggest a component of pulmonary arterial hypertension  Shotty bilateral axillary lymph nodes  Some fat stranding in the right axillary region is noted, consider a component of lymphadenitis  Absent left kidney  Other findings as above  Workstation performed: [id number]         XR chest 1 view portable   ED Interpretation by Dagmar Cho PA-C (01/08 2114)   Similar when compared to prior                  Procedures  ECG 12 Lead Documentation Only    Date/Time: 1/8/2021 9:18 PM  Performed by: Dagmar Cho PA-C  Authorized by: Dagmar Cho PA-C     Indications / Diagnosis:  Chest pain  ECG reviewed by me, the ED Provider: yes    Patient location:  ED  Interpretation:     Interpretation: normal    Rate:     ECG rate:  70    ECG rate assessment: normal    Rhythm:     Rhythm: sinus rhythm    Ectopy:     Ectopy: none    QRS:     QRS axis:  Normal    QRS intervals:  Normal  Conduction:     Conduction: normal    ST segments:     ST segments:  Normal  T waves:     T waves: normal               ED Course                             SBIRT 22yo+      Most Recent Value   SBIRT (24 yo +)   In order to provide better care to our patients, we are screening all of our patients for alcohol and drug use  Would it be okay to ask you these screening questions? Yes Filed at: 01/08/2021 2223   Initial Alcohol Screen: US AUDIT-C    1   How often do you have a drink containing alcohol?  0 Filed at: 01/08/2021 2223   2  How many drinks containing alcohol do you have on a typical day you are drinking? 0 Filed at: 01/08/2021 2223   3a  Male UNDER 65: How often do you have five or more drinks on one occasion? 0 Filed at: 01/08/2021 2223   3b  FEMALE Any Age, or MALE 65+: How often do you have 4 or more drinks on one occassion? 0 Filed at: 01/08/2021 2223   Audit-C Score  0 Filed at: 01/08/2021 2223   BECKA: How many times in the past year have you    Used an illegal drug or used a prescription medication for non-medical reasons? 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% for the past 2 days  States that it felt like a normal asthma exacerbation different due to onset of chest pain on Tuesday  Symptoms thought to be related to illness versus asthma exacerbation  Will treat supportively at home  Stable for discharge home at this time  Amount and/or Complexity of Data Reviewed  Clinical lab tests: ordered and reviewed  Tests in the radiology section of CPT®: ordered and reviewed    Risk of Complications, Morbidity, and/or Mortality  Presenting problems: moderate  Diagnostic procedures: moderate  Management options: moderate    Patient Progress  Patient progress: stable      Disposition  Final diagnoses:   Viral illness     Time reflects when diagnosis was documented in both MDM as applicable and the Disposition within this note     Time User Action Codes Description Comment    1/8/2021 11:45 PM Suzi Means Add [B34 9] Viral illness       ED Disposition     ED Disposition Condition Date/Time Comment    Discharge Stable Fri Jan 8, 2021 11:45 PM [address] discharge to home/self care              Follow-up Information Follow up With Specialties Details Why Contact Info    Nishant Lopez MD Family Medicine Call  As needed [address]  [address] [place] [address]  [phone]            Patient's Medications   Discharge Prescriptions    No medications on file     No discharge procedures on file      PDMP Review     None          ED Provider  Electronically Signed by           Tamie Norton PA-C  01/08/21 2441

## 2021-01-09 NOTE — DISCHARGE INSTRUCTIONS
Continue using nebulizer as needed every 4-6 hours for shortness of breath  Return for new or worsening complaints  Follow-up with primary care doctor  You were tested today for coronavirus  You will be notified when results become available

## 2021-01-10 ENCOUNTER — APPOINTMENT (EMERGENCY)
Dept: RADIOLOGY | Facility: HOSPITAL | Age: 66
End: 2021-01-10
Payer: COMMERCIAL

## 2021-01-10 ENCOUNTER — TELEPHONE (OUTPATIENT)
Dept: EMERGENCY DEPT | Facility: HOSPITAL | Age: 66
End: 2021-01-10

## 2021-01-10 ENCOUNTER — HOSPITAL ENCOUNTER (EMERGENCY)
Facility: HOSPITAL | Age: 66
Discharge: HOME | End: 2021-01-10
Attending: EMERGENCY MEDICINE | Admitting: EMERGENCY MEDICINE
Payer: COMMERCIAL

## 2021-01-10 VITALS
DIASTOLIC BLOOD PRESSURE: 66 MMHG | RESPIRATION RATE: 18 BRPM | TEMPERATURE: 97.5 F | HEART RATE: 68 BPM | OXYGEN SATURATION: 98 % | SYSTOLIC BLOOD PRESSURE: 127 MMHG | BODY MASS INDEX: 39.96 KG/M2 | WEIGHT: 218.5 LBS

## 2021-01-10 DIAGNOSIS — R42 DIZZINESS: ICD-10-CM

## 2021-01-10 DIAGNOSIS — Z00.00 EVALUATION BY MEDICAL SERVICE REQUIRED: Primary | ICD-10-CM

## 2021-01-10 LAB
ANION GAP SERPL CALCULATED.3IONS-SCNC: 7 MMOL/L (ref 5–14)
ATRIAL RATE: 62 BPM
ATRIAL RATE: 70 BPM
BASOPHILS # BLD AUTO: 0.1 THOUSAND/UL (ref 0–0.1)
BASOPHILS NFR MAR MANUAL: 1 % (ref 0–1)
BUN SERPL-MCNC: 16 MG/DL (ref 5–25)
CALCIUM SERPL-MCNC: 9.5 MG/DL (ref 8.4–10.2)
CHLORIDE SERPL-SCNC: 103 MMOL/L (ref 97–108)
CO2 SERPL-SCNC: 31 MMOL/L (ref 22–30)
CREAT SERPL-MCNC: 0.82 MG/DL (ref 0.6–1.2)
EOSINOPHIL # BLD AUTO: 0.2 THOUSAND/UL (ref 0–0.4)
EOSINOPHIL NFR BLD MANUAL: 2 % (ref 0–6)
ERYTHROCYTE [DISTWIDTH] IN BLOOD BY AUTOMATED COUNT: 14.7 %
FLUAV RNA NPH QL NAA+PROBE: NOT DETECTED
FLUBV RNA NPH QL NAA+PROBE: NOT DETECTED
GFR SERPL CREATININE-BSD FRML MDRD: 87 ML/MIN/1.73SQ M
GLUCOSE SERPL-MCNC: 108 MG/DL (ref 70–99)
HCT VFR BLD AUTO: 44 % (ref 36–46)
HGB BLD-MCNC: 14.3 G/DL (ref 12–16)
LYMPHOCYTES # BLD AUTO: 2.5 THOUSAND/UL (ref 0.5–4)
LYMPHOCYTES # BLD AUTO: 25 % (ref 25–45)
MCH RBC QN AUTO: 26.4 PG (ref 26–34)
MCHC RBC AUTO-ENTMCNC: 32.4 G/DL (ref 31–36)
MCV RBC AUTO: 81 FL (ref 80–100)
MONOCYTES # BLD AUTO: 1.4 THOUSAND/UL (ref 0.2–0.9)
MONOCYTES NFR BLD AUTO: 14 % (ref 1–10)
NEUTS SEG # BLD: 5.8 THOUSAND/UL (ref 1.8–7.8)
NEUTS SEG NFR BLD AUTO: 58 %
P AXIS: 48 DEGREES
P AXIS: 62 DEGREES
PLATELET # BLD AUTO: 292 THOUSANDS/UL (ref 150–450)
PLATELET BLD QL SMEAR: ADEQUATE
PMV BLD AUTO: 9.5 FL (ref 8.9–12.7)
POTASSIUM SERPL-SCNC: 4.1 MMOL/L (ref 3.6–5)
PR INTERVAL: 192 MS
PR INTERVAL: 194 MS
QRS AXIS: -3 DEGREES
QRS AXIS: 26 DEGREES
QRSD INTERVAL: 80 MS
QRSD INTERVAL: 84 MS
QT INTERVAL: 390 MS
QT INTERVAL: 404 MS
QTC INTERVAL: 410 MS
QTC INTERVAL: 421 MS
RBC # BLD AUTO: 5.41 MILLION/UL (ref 4–5.2)
RBC MORPH BLD: NORMAL
RSV RNA NPH QL NAA+PROBE: NOT DETECTED
SARS-COV-2 RNA NPH QL NAA+PROBE: NOT DETECTED
SODIUM SERPL-SCNC: 141 MMOL/L (ref 137–147)
T WAVE AXIS: 44 DEGREES
T WAVE AXIS: 44 DEGREES
TOTAL CELLS COUNTED SPEC: 100
TROPONIN I SERPL-MCNC: <0.01 NG/ML (ref 0–0.03)
VENTRICULAR RATE: 62 BPM
VENTRICULAR RATE: 70 BPM
WBC # BLD AUTO: 10 THOUSAND/UL (ref 4.5–11)

## 2021-01-10 PROCEDURE — 80048 BASIC METABOLIC PNL TOTAL CA: CPT | Performed by: PHYSICIAN ASSISTANT

## 2021-01-10 PROCEDURE — 93010 ELECTROCARDIOGRAM REPORT: CPT | Performed by: INTERNAL MEDICINE

## 2021-01-10 PROCEDURE — 71046 X-RAY EXAM CHEST 2 VIEWS: CPT

## 2021-01-10 PROCEDURE — 36415 COLL VENOUS BLD VENIPUNCTURE: CPT | Performed by: PHYSICIAN ASSISTANT

## 2021-01-10 PROCEDURE — 85007 BL SMEAR W/DIFF WBC COUNT: CPT | Performed by: PHYSICIAN ASSISTANT

## 2021-01-10 PROCEDURE — 96360 HYDRATION IV INFUSION INIT: CPT

## 2021-01-10 PROCEDURE — 99285 EMERGENCY DEPT VISIT HI MDM: CPT | Performed by: PHYSICIAN ASSISTANT

## 2021-01-10 PROCEDURE — 84484 ASSAY OF TROPONIN QUANT: CPT | Performed by: PHYSICIAN ASSISTANT

## 2021-01-10 PROCEDURE — 85027 COMPLETE CBC AUTOMATED: CPT | Performed by: PHYSICIAN ASSISTANT

## 2021-01-10 PROCEDURE — 93005 ELECTROCARDIOGRAM TRACING: CPT

## 2021-01-10 PROCEDURE — 99284 EMERGENCY DEPT VISIT MOD MDM: CPT

## 2021-01-10 RX ADMIN — SODIUM CHLORIDE 1000 ML: 0.9 INJECTION, SOLUTION INTRAVENOUS at 17:49

## 2021-01-10 NOTE — ED PROVIDER NOTES
History  Chief Complaint   Patient presents with    Dizziness     pt states had covid shot last week and yesterday now c/o dizziness and low oxygen levels (91% at home)     30-year-old female with past medical history of asthma, diabetes, GERD, hypertension, hyperlipidemia presenting for re-evaluation after being seen 2 days ago for viral illness  During patient's previous ED visit 2 days ago she had a workup with labs and imaging  Pt negative covid  cta ed pe study showed possible pulmonary htn but no pna or PE  Pt d/c with instructions for supportive tmt at home  Pt was called today given negative covid results and states at home she has had 90% pulse ox at home  She reports dizziness that is worse but reports likely secondary to her glucose levels  Pt states she had covid vaccine last week and is concerned that this is now causing her symptoms of dizziness and low oxygen levels  Prior to Admission Medications   Prescriptions Last Dose Informant Patient Reported? Taking?    Lancets (ONETOUCH ULTRASOFT) lancets   No No   Sig: Check blood glucose 3 times a day   Multiple Vitamins-Minerals (MULTIVITAMIN ADULT PO)   Yes No   Si tablet every 24 hours   Ventolin  (90 Base) MCG/ACT inhaler   No No   Sig: Inhale 2 puffs every 6 (six) hours as needed for wheezing   albuterol (2 5 mg/3 mL) 0 083 % nebulizer solution   No No   Sig: Take 1 vial (2 5 mg total) by nebulization every 6 (six) hours as needed for wheezing or shortness of breath   benzonatate (TESSALON PERLES) 100 mg capsule   No No   Sig: Take 1 capsule (100 mg total) by mouth every 8 (eight) hours   diclofenac sodium (VOLTAREN) 1 %   Yes No   ergocalciferol (VITAMIN D2) 50,000 units   No No   Sig: Take 1 capsule (50,000 Units total) by mouth once a week Take one capsule weekly by mouth   fexofenadine (ALLEGRA) 180 MG tablet   No No   Sig: TAKE 1 TABLET BY MOUTH DAILY   fluticasone (FLONASE) 50 mcg/act nasal spray   No No   Si sprays into each nostril daily   Patient taking differently: 2 sprays into each nostril daily as needed    glucose blood test strip   No No   Sig: Checks blood glucose 3 times a day     hydrochlorothiazide (HYDRODIURIL) 25 mg tablet   No No   Sig: Take 1 tablet (25 mg total) by mouth every morning   levothyroxine (Synthroid) 50 mcg tablet   No No   Sig: Take 1 tablet (50 mcg total) by mouth daily   olmesartan (Benicar) 40 mg tablet   No No   Sig: Take 1 tablet (40 mg total) by mouth daily   pantoprazole (PROTONIX) 40 mg tablet   No No   Sig: Take 1 tablet (40 mg total) by mouth daily   rosuvastatin (CRESTOR) 5 mg tablet   No No   Sig: Take 1 tablet (5 mg total) by mouth daily      Facility-Administered Medications: None       Past Medical History:   Diagnosis Date    Asthma     Colon polyp     Diabetes ([facility] )     type 2    Diabetes mellitus ([facility] )     Disease of thyroid gland     GERD (gastroesophageal reflux disease)     Hyperlipidemia     Hypertension     Obesity     Seasonal allergies     Thyroid disease     Tinnitus        Past Surgical History:   Procedure Laterality Date     SECTION      Triplets    COLONOSCOPY      COLONOSCOPY W/ POLYPECTOMY  2020    HYSTERECTOMY      REDUCTION MAMMAPLASTY         Family History   Problem Relation Age of Onset    Coronary artery disease Maternal Grandfather     Breast cancer Maternal Aunt 62    Stroke Mother     No Known Problems Father     No Known Problems Sister     No Known Problems Daughter     No Known Problems Maternal Grandmother     No Known Problems Paternal Grandmother     No Known Problems Paternal Grandfather     No Known Problems Daughter     No Known Problems Daughter     No Known Problems Sister     No Known Problems Sister     No Known Problems Sister     No Known Problems Sister     No Known Problems Sister     No Known Problems Sister     No Known Problems Maternal Aunt     No Known Problems Maternal Aunt     No Known Problems Maternal Aunt     No Known Problems Maternal Aunt     No Known Problems Maternal Aunt     No Known Problems Paternal Aunt     No Known Problems Paternal Aunt      I have reviewed and agree with the history as documented  E-Cigarette/Vaping    E-Cigarette Use Never User      E-Cigarette/Vaping Substances    Nicotine No     THC No     CBD No     Flavoring No     Other No      Social History     Tobacco Use    Smoking status: Former Smoker     Quit date: 3/18/2010     Years since quitting: 10 8    Smokeless tobacco: Never Used   Substance Use Topics    Alcohol use: Yes     Alcohol/week: 4 0 standard drinks     Types: 4 Glasses of wine per week     Frequency: Monthly or less     Drinks per session: 1 or 2     Binge frequency: Never    Drug use: No       Review of Systems   All other systems reviewed and are negative  Physical Exam  Physical Exam  Vitals signs and nursing note reviewed  Constitutional:       General: She is not in acute distress  Appearance: She is well-developed  She is not ill-appearing or diaphoretic  HENT:      Head: Normocephalic and atraumatic  Eyes:      Conjunctiva/sclera: Conjunctivae normal       Comments: EOM grossly intact   Neck:      Musculoskeletal: Normal range of motion and neck supple  Vascular: No JVD  Cardiovascular:      Rate and Rhythm: Normal rate  Heart sounds: No murmur  No friction rub  No gallop  Pulmonary:      Effort: Pulmonary effort is normal  No respiratory distress  Breath sounds: No wheezing  Abdominal:      Palpations: Abdomen is soft  Musculoskeletal:      Comments: FROM, steady gait, cap refill brisk, strength and sensation grossly intact throughout   Skin:     General: Skin is warm and dry  Capillary Refill: Capillary refill takes less than 2 seconds  Neurological:      Mental Status: She is alert and oriented to person, place, and time     Psychiatric:         Behavior: Behavior normal  Vital Signs  ED Triage Vitals [01/10/21 1654]   Temperature Pulse Respirations Blood Pressure SpO2   97 5 °F (36 4 °C) 75 18 143/80 96 %      Temp Source Heart Rate Source Patient Position - Orthostatic VS BP Location FiO2 (%)   Tympanic Monitor Sitting Left arm --      Pain Score       --           Vitals:    01/10/21 1654   BP: 143/80   Pulse: 75   Patient Position - Orthostatic VS: Sitting         Visual Acuity      ED Medications  Medications   sodium chloride 0 9 % bolus 1,000 mL (1,000 mL Intravenous New Bag 1/10/21 1749)       Diagnostic Studies  Results Reviewed     Procedure Component Value Units Date/Time    Troponin I [[id number]]  (Normal) Collected: 01/10/21 1733    Lab Status: Final result Specimen: Blood from Arm, Left Updated: 01/10/21 1811     Troponin I <0 01 ng/mL     Basic metabolic panel [[id number]]  (Abnormal) Collected: 01/10/21 1733    Lab Status: Final result Specimen: Blood from Arm, Left Updated: 01/10/21 1757     Sodium 141 mmol/L      Potassium 4 1 mmol/L      Chloride 103 mmol/L      CO2 31 mmol/L      ANION GAP 7 mmol/L      BUN 16 mg/dL      Creatinine 0 82 mg/dL      Glucose 108 mg/dL      Calcium 9 5 mg/dL      eGFR 87 ml/min/1 73sq m     Narrative:      Brenda guidelines for Chronic Kidney Disease (CKD):     Stage 1 with normal or high GFR (GFR > 90 mL/min/1 73 square meters)    Stage 2 Mild CKD (GFR = 60-89 mL/min/1 73 square meters)    Stage 3A Moderate CKD (GFR = 45-59 mL/min/1 73 square meters)    Stage 3B Moderate CKD (GFR = 30-44 mL/min/1 73 square meters)    Stage 4 Severe CKD (GFR = 15-29 mL/min/1 73 square meters)    Stage 5 End Stage CKD (GFR <15 mL/min/1 73 square meters)  Note: GFR calculation is accurate only with a steady state creatinine    CBC and differential [[id number]]  (Abnormal) Collected: 01/10/21 1733    Lab Status: Final result Specimen: Blood from Arm, Left Updated: 01/10/21 1743     WBC 10 00 Thousand/uL      RBC 5 41 Million/uL      Hemoglobin 14 3 g/dL      Hematocrit 44 0 %      MCV 81 fL      MCH 26 4 pg      MCHC 32 4 g/dL      RDW 14 7 %      MPV 9 5 fL      Platelets 922 Thousands/uL     Manual Differential (Non Wam) [[id number]] Collected: 01/10/21 1733    Lab Status: In process Specimen: Blood from Arm, Left Updated: 01/10/21 1742                 XR chest 2 views    (Results Pending)              Procedures  ECG 12 Lead Documentation Only    Date/Time: 1/10/2021 6:00 PM  Performed by: Fabi Corrales PA-C  Authorized by: Fabi Corrales PA-C     Indications / Diagnosis:  Dizzy  ECG reviewed by me, the ED Provider: yes    Patient location:  ED  Interpretation:     Interpretation: normal    Rate:     ECG rate:  62    ECG rate assessment: normal    Rhythm:     Rhythm: sinus rhythm    Ectopy:     Ectopy: none    QRS:     QRS axis:  Normal  Conduction:     Conduction: normal    ST segments:     ST segments:  Normal  T waves:     T waves: normal    Comments:      qtc 410             ED Course  ED Course as of Tony 10 1829   Sun Tony 10, 2021   1719 Advised pt that you dont get covid from covid vaccine and she started hysterically laughing                                 SBIRT 20yo+      Most Recent Value   SBIRT (23 yo +)   In order to provide better care to our patients, we are screening all of our patients for alcohol and drug use  Would it be okay to ask you these screening questions? Yes Filed at: 01/10/2021 1758   Initial Alcohol Screen: US AUDIT-C    1  How often do you have a drink containing alcohol?  0 Filed at: 01/10/2021 1758   2  How many drinks containing alcohol do you have on a typical day you are drinking? 0 Filed at: 01/10/2021 1758   3a  Male UNDER 65: How often do you have five or more drinks on one occasion? 0 Filed at: 01/10/2021 1758   3b  FEMALE Any Age, or MALE 65+: How often do you have 4 or more drinks on one occassion?   0 Filed at: 01/10/2021 1758   Audit-C Score  0 Filed at: 01/10/2021 1758   BECKA: How many times in the past year have you    Used an illegal drug or used a prescription medication for non-medical reasons? Never Filed at: 01/10/2021 1758                    [facility]  Number of Diagnoses or Management Options  Diagnosis management comments: 56-year-old female presenting for re-evaluation after the ED visit 2 days ago, patient found to have a negative COVID test since that time, she reports she has been taking her pulse ox at home and has been dipping down to the low 90s, patient is 96% on room air emergency department, she has no respiratory distress, she appears well, all other vitals are normal, repeat labs and imaging all unremarkable, patient is concerned that all of her symptoms are secondary to COVID vaccination and is concerned that she does have COVID from the vaccination, long conversation with pt and pt education given, f/u with pcp as an outpatient    All labs and imaging discussed with patient, strict return to ED precautions discussed  Pt verbalizes understanding and agrees with plan  Pt is stable for discharge    Portions of the record may have been created with voice recognition software  Occasional wrong word or "sound a like" substitutions may have occurred due to the inherent limitations of voice recognition software  Read the chart carefully and recognize, using context, where substitutions have occurred  Disposition  Final diagnoses:   Evaluation by medical service required   Dizziness     Time reflects when diagnosis was documented in both MDM as applicable and the Disposition within this note     Time User Action Codes Description Comment    1/10/2021  6:29 PM Savage Pepe [Z00 00] Evaluation by medical service required     1/10/2021  6:29 PM Savage Pepe [R42] Dizziness       ED Disposition     ED Disposition Condition Date/Time Comment    Discharge Stable Sun Tony 10, 2021  6:28 PM Wanda Schaffer discharge to home/self care  Follow-up Information     Follow up With Specialties Details Why Contact Info    Kasia Barnes MD Family Medicine Call in 1 day  [phone]      [facility] Emergency Department Emergency Medicine Go to  If symptoms worsen [address] [address] [phone]          Patient's Medications   Discharge Prescriptions    No medications on file     No discharge procedures on file      PDMP Review     None          ED Provider  Electronically Signed by           Binh Trent PA-C  01/10/21 7716

## 2021-01-10 NOTE — TELEPHONE ENCOUNTER
Called pt  Discussed negative covid-19 test results,  Pt will return to er if condition worsens,  Pt will recheck family doctor

## 2021-01-11 NOTE — ED ATTENDING ATTESTATION
I was the attending physician on duty at the time the patient visited the emergency department  The patient was evaluated and dispositioned by the APC  I was personally available for consultation  I am administratively signing the chart after the fact      Remy Pineda MD

## 2021-01-13 ENCOUNTER — OFFICE VISIT (OUTPATIENT)
Dept: FAMILY MEDICINE CLINIC | Facility: CLINIC | Age: 66
End: 2021-01-13
Payer: COMMERCIAL

## 2021-01-13 VITALS
HEART RATE: 62 BPM | SYSTOLIC BLOOD PRESSURE: 132 MMHG | DIASTOLIC BLOOD PRESSURE: 80 MMHG | OXYGEN SATURATION: 95 % | WEIGHT: 216 LBS | RESPIRATION RATE: 18 BRPM | TEMPERATURE: 97.7 F | HEIGHT: 62 IN | BODY MASS INDEX: 39.75 KG/M2

## 2021-01-13 DIAGNOSIS — J45.909 MILD ASTHMA WITHOUT COMPLICATION, UNSPECIFIED WHETHER PERSISTENT: ICD-10-CM

## 2021-01-13 DIAGNOSIS — B34.9 VIRAL ILLNESS: ICD-10-CM

## 2021-01-13 DIAGNOSIS — L20.9 ATOPIC DERMATITIS, UNSPECIFIED TYPE: ICD-10-CM

## 2021-01-13 DIAGNOSIS — I27.21 PULMONARY ARTERIAL HYPERTENSION (HCC): Primary | ICD-10-CM

## 2021-01-13 DIAGNOSIS — Z78.0 POSTMENOPAUSAL: ICD-10-CM

## 2021-01-13 DIAGNOSIS — Z12.31 ENCOUNTER FOR SCREENING MAMMOGRAM FOR MALIGNANT NEOPLASM OF BREAST: ICD-10-CM

## 2021-01-13 PROCEDURE — 99214 OFFICE O/P EST MOD 30 MIN: CPT | Performed by: NURSE PRACTITIONER

## 2021-01-13 RX ORDER — BENZONATATE 100 MG/1
100 CAPSULE ORAL EVERY 8 HOURS
Qty: 21 CAPSULE | Refills: 0 | Status: SHIPPED | OUTPATIENT
Start: 2021-01-13 | End: 2021-12-02 | Stop reason: ALTCHOICE

## 2021-01-13 NOTE — PROGRESS NOTES
Assessment/Plan:    Pulmonary arterial hypertension ([facility] )  -Per CTA done in ER on 1/8 patient with PAH  She states that she has progressively become more fatigued and short of breath and attributes to her asthma  Finds herself with difficulty breathing at times  I am recommending patient f/u with specialist for further workup  Pt in agreement with plan of care  Postmenopausal  -Ordering DXA scan    Encounter for screening mammogram for malignant neoplasm of breast  -ordering mammogram       Diagnoses and all orders for this visit:    Pulmonary arterial hypertension ([facility] )  -     Ambulatory referral to Pulmonology; Future    Postmenopausal  -     DXA bone density spine hip and pelvis; Future    Encounter for screening mammogram for malignant neoplasm of breast  -     Mammo screening bilateral w 3d & cad; Future    Mild asthma without complication, unspecified whether persistent  -     Ventolin  (90 Base) MCG/ACT inhaler; Inhale 2 puffs every 6 (six) hours as needed for wheezing    Atopic dermatitis, unspecified type  -     hydrocortisone 2 5 % cream; Apply topically 4 (four) times a day as needed for irritation or rash    Viral illness  -     benzonatate (TESSALON PERLES) 100 mg capsule; Take 1 capsule (100 mg total) by mouth every 8 (eight) hours          Subjective:      Patient ID: Tomás Givens is a 60 y o  female  60year old female patient here for follow up from ER  States she was home and her 02 saturation was dropping in 91% and they gave her fluids per patient  She had Xray and EKG and were normal and labs  Her CTA shows pulmonary arterial hypertension  States she has difficulty breathing especially with having the fan        The following portions of the patient's history were reviewed and updated as appropriate: allergies, current medications, past family history, past medical history, past social history, past surgical history and problem list     Review of Systems   Constitutional: Positive for fatigue  HENT: Negative  Negative for congestion  Eyes: Negative  Respiratory: Positive for shortness of breath (at times)  Negative for cough, chest tightness and stridor  Cardiovascular: Negative  Negative for chest pain and palpitations  Gastrointestinal: Negative  Endocrine: Negative  Genitourinary: Negative  Musculoskeletal: Negative  Skin: Negative  Allergic/Immunologic: Negative  Neurological: Positive for dizziness  Hematological: Negative  Psychiatric/Behavioral: Negative  Objective:      /80 (BP Location: Left arm, Patient Position: Sitting, Cuff Size: Large)   Pulse 62   Temp 97 7 °F (36 5 °C) (Temporal)   Resp 18   Ht 5' 2" (1 575 m)   Wt 98 kg (216 lb)   SpO2 95%   BMI 39 51 kg/m²          Physical Exam  Vitals signs and nursing note reviewed  Constitutional:       General: She is not in acute distress  Appearance: Normal appearance  She is not ill-appearing  HENT:      Head: Normocephalic and atraumatic  Nose: Nose normal       Mouth/Throat:      Pharynx: Oropharynx is clear  Eyes:      Conjunctiva/sclera: Conjunctivae normal    Neck:      Musculoskeletal: Normal range of motion and neck supple  Cardiovascular:      Rate and Rhythm: Normal rate and regular rhythm  Pulses: Normal pulses  Heart sounds: Normal heart sounds  Pulmonary:      Effort: Pulmonary effort is normal  No respiratory distress  Breath sounds: Normal breath sounds  Abdominal:      General: Bowel sounds are normal       Palpations: Abdomen is soft  Musculoskeletal: Normal range of motion  Skin:     General: Skin is warm and dry  Capillary Refill: Capillary refill takes less than 2 seconds  Neurological:      General: No focal deficit present  Mental Status: She is alert and oriented to person, place, and time     Psychiatric:         Mood and Affect: Mood normal          Behavior: Behavior normal            Chief Complaint Patient presents with    Dizziness

## 2021-01-13 NOTE — ASSESSMENT & PLAN NOTE
-Per CTA done in ER on 1/8 patient with PAH  She states that she has progressively become more fatigued and short of breath and attributes to her asthma  Finds herself with difficulty breathing at times  I am recommending patient f/u with specialist for further workup  Pt in agreement with plan of care

## 2021-01-27 ENCOUNTER — OFFICE VISIT (OUTPATIENT)
Dept: PULMONOLOGY | Facility: CLINIC | Age: 66
End: 2021-01-27
Payer: COMMERCIAL

## 2021-01-27 VITALS
HEIGHT: 62 IN | RESPIRATION RATE: 18 BRPM | TEMPERATURE: 97.9 F | BODY MASS INDEX: 40.12 KG/M2 | HEART RATE: 63 BPM | SYSTOLIC BLOOD PRESSURE: 108 MMHG | OXYGEN SATURATION: 95 % | WEIGHT: 218 LBS | DIASTOLIC BLOOD PRESSURE: 58 MMHG

## 2021-01-27 DIAGNOSIS — R06.00 DYSPNEA ON EXERTION: Primary | ICD-10-CM

## 2021-01-27 DIAGNOSIS — R91.8 ABNORMAL CT SCAN OF LUNG: ICD-10-CM

## 2021-01-27 DIAGNOSIS — J30.2 SEASONAL ALLERGIC RHINITIS, UNSPECIFIED TRIGGER: ICD-10-CM

## 2021-01-27 PROCEDURE — 94618 PULMONARY STRESS TESTING: CPT | Performed by: INTERNAL MEDICINE

## 2021-01-27 PROCEDURE — 99245 OFF/OP CONSLTJ NEW/EST HI 55: CPT | Performed by: INTERNAL MEDICINE

## 2021-01-27 RX ORDER — HYDROCORTISONE 25 MG/G
CREAM TOPICAL
COMMUNITY
Start: 2021-01-13 | End: 2021-05-19 | Stop reason: ALTCHOICE

## 2021-01-27 RX ORDER — INDOMETHACIN 50 MG/1
CAPSULE ORAL
COMMUNITY
Start: 2020-12-22 | End: 2021-12-02 | Stop reason: ALTCHOICE

## 2021-01-27 NOTE — PROGRESS NOTES
Pulmonary Consultation   Rosalind Marte 73 y o  female MRN: [id number]  1/27/2021      Assessment:  Concern for pulmonary hypertension  · Pulmonary artery size appears to be 3 7 on the last CT PE, no evidence of pulmonary embolism  · Last TTE done in 05/2019, estimated PA SP is normal, TAPSE 2 51  · Currently denies any significant dyspnea on exertion, limitation of functional capacity,  · No accentuated 2nd heart sounds, no signs of hypervolemia on exam  · 6 minutes walk test in the office today showing no signs of exertional hypoxemia  Plan:  · Will start by rechecking a TTE, check for signs of elevated PA pressure, RV systolic dysfunction/size  · Will start workup accordingly for pulmonary hypertension if the echo is positive    Abnormal CT chest  · Very peripheral ground-glass opacities/very mild with sub pleural/basal predominance  · No honeycombing or significant interstitial thickening  · Will check complete pulmonary function test for any signs of restrictive/obstructive lung disease  · Will likely require high-resolution CT chest after that    Allergic rhinitis/? Reactive airway disease  · Okay to continue p r n  albuterol  · Reports no significant symptoms  · Complete PFTs as above    Follow-up in 1-2 months after the workup is completed    History of Present Illness   18-year-old female with a history of reflux, obesity, DM2, seasonal allergies, asthma who was referred for evaluation by Pulmonary for pulmonary hypertension  Patient is not a very reliable historian does not recall why she was referred to Pulmonary  At this time, she reports seasonal allergy symptoms of rhinorrhea, chest tightness when exposed to cold  States that this has been chronic  Denies significant exertional dyspnea, reports some lower extremity edema, does not recall when started  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, taking vital signs and helping staff  No limitation of exercise capacity    Reports significant weight gain over the past few years  Denies exertional angina, syncope, presyncope, dizziness or diaphoresis with exertion  No orthopnea or PND  No personal or family history of rheumatologic disorder or pulmonary hypertension  About 10-20 pack year tobacco abuse history quit in , nonalcoholic, never abused drugs or cocaine  No prior intake of weight loss preparations or stimulants  No prior history of asthma or COPD, however uses p r n  albuterol currently  Review of Systems  As per hpi, all other systems reviewed and were negative  TTE 05/15/2019-EF 63%  Mild LV hypertrophy  Pulmonary artery systolic pressure estimated to be normal, normal size  TAPSE 2 51    CT chest 10/07/2020-no pulmonary embolism  Peripheral ground-glass opacities, some subpleural reticulation at the bases  6 minutes walk test 2021-baseline 96% on room air, heart rate 61  Able to walk 420 m in 6 minutes, lowest SpO2 92% on room air, maximal heart rate 101    Social history  Originally from [place], moved to the [facility] in   Smoked for 40 years, about 1/4 of a pack a day quit in   Nonalcoholic  Used to work as a nursing aide, at "[employer]   and lives with her  in [facility]  No significant occupational/environmental hazard exposure      Historical Information   Past Medical History:   Diagnosis Date    Asthma     Colon polyp     Diabetes ([address] )     type 2    Diabetes mellitus (HCC)     Disease of thyroid gland     GERD (gastroesophageal reflux disease)     Hyperlipidemia     Hypertension     Obesity     Seasonal allergies     Thyroid disease     Tinnitus      Past Surgical History:   Procedure Laterality Date     SECTION      Triplets    COLONOSCOPY      COLONOSCOPY W/ POLYPECTOMY  2020    HYSTERECTOMY      REDUCTION MAMMAPLASTY       Family History   Problem Relation Age of Onset    Coronary artery disease Maternal Grandfather     Breast cancer Maternal Aunt 62    Stroke Mother     No Known Problems Father     No Known Problems Sister     No Known Problems Daughter     No Known Problems Maternal Grandmother     No Known Problems Paternal Grandmother     No Known Problems Paternal Grandfather     No Known Problems Daughter     No Known Problems Daughter     No Known Problems Sister     No Known Problems Sister     No Known Problems Sister     No Known Problems Sister     No Known Problems Sister     No Known Problems Sister     No Known Problems Maternal Aunt     No Known Problems Maternal Aunt     No Known Problems Maternal Aunt     No Known Problems Maternal Aunt     No Known Problems Maternal Aunt     No Known Problems Paternal Aunt     No Known Problems Paternal Aunt          Medications/Allergies: Reviewed    Vitals: Blood pressure 108/58, pulse 63, temperature 97 9 °F (36 6 °C), resp  rate 18, height 5' 2" (1 575 m), weight 98 9 kg (218 lb), SpO2 95 %, not currently breastfeeding  Body mass index is 39 87 kg/m²  Oxygen Therapy  SpO2: 95 %  Oxygen Therapy: None (Room air)      Physical Exam  Body mass index is 39 87 kg/m²    Gen: not in acute distress, morbidly obese  HEENT: supple, no adenopathy, PERRLA  Chest: normal respiratory efforts, clear breath sounds bilaterally  CV: RRR, no murmurs appreciated  Abdomen: soft, non tender, normal bowel sounds  Extremities: no edema  Neuro: AAO X3, no focal motor deficit      Labs:  Lab Results   Component Value Date    WBC 10 00 01/10/2021    HGB 14 3 01/10/2021    HCT 44 0 01/10/2021    MCV 81 01/10/2021     01/10/2021     Lab Results   Component Value Date    CALCIUM 9 5 01/10/2021    K 4 1 01/10/2021    CO2 31 (H) 01/10/2021     01/10/2021    BUN 16 01/10/2021    CREATININE 0 82 01/10/2021     No results found for: IGE  Lab Results   Component Value Date    ALT 17 01/08/2021    AST 25 01/08/2021    ALKPHOS 77 01/08/2021           Imaging and other studies: I have personally reviewed pertinent films in PACS      Pulmonary function testing:       EKG, Pathology, and Other Studies: I have personally reviewed pertinent reports  Portions of the record may have been created with voice recognition software  Occasional wrong word or "sound a like" substitutions may have occurred due to the inherent limitations of voice recognition software  Read the chart carefully and recognize, using context, where substitutions have occurred    [facility] swing-through gait

## 2021-02-08 ENCOUNTER — TELEPHONE (OUTPATIENT)
Dept: FAMILY MEDICINE CLINIC | Facility: CLINIC | Age: 66
End: 2021-02-08

## 2021-02-08 DIAGNOSIS — Z87.898 HISTORY OF PREDIABETES: Primary | ICD-10-CM

## 2021-02-08 NOTE — TELEPHONE ENCOUNTER
She does not have diabetes diagnosis, after having pending HbA1C ordered then we will see if she is diabetic or not,then she may have glucometer  She can buy her own if she desires

## 2021-02-09 NOTE — TELEPHONE ENCOUNTER
Pt  Returned phone call  We have no back line number for you and it Dior Camilo is still on it and asked to be taken off

## 2021-03-05 ENCOUNTER — HOSPITAL ENCOUNTER (OUTPATIENT)
Dept: RADIOLOGY | Facility: HOSPITAL | Age: 66
Discharge: HOME | End: 2021-03-05
Attending: PODIATRIST
Payer: COMMERCIAL

## 2021-03-05 ENCOUNTER — TRANSCRIBE ORDERS (OUTPATIENT)
Dept: ADMINISTRATIVE | Facility: HOSPITAL | Age: 66
End: 2021-03-05

## 2021-03-05 ENCOUNTER — LAB (OUTPATIENT)
Dept: LAB | Facility: HOSPITAL | Age: 66
End: 2021-03-05
Payer: COMMERCIAL

## 2021-03-05 DIAGNOSIS — D72.829 LEUKOCYTOSIS, UNSPECIFIED TYPE: ICD-10-CM

## 2021-03-05 DIAGNOSIS — E03.9 HYPOTHYROIDISM (ACQUIRED): ICD-10-CM

## 2021-03-05 DIAGNOSIS — R79.89 ELEVATED LIVER FUNCTION TESTS: ICD-10-CM

## 2021-03-05 DIAGNOSIS — M79.671 PAIN IN RIGHT FOOT: Primary | ICD-10-CM

## 2021-03-05 DIAGNOSIS — M79.671 PAIN IN RIGHT FOOT: ICD-10-CM

## 2021-03-05 DIAGNOSIS — R73.03 PRE-DIABETES: ICD-10-CM

## 2021-03-05 LAB
ALBUMIN SERPL BCP-MCNC: 4 G/DL (ref 3–5.2)
ALP SERPL-CCNC: 70 U/L (ref 43–122)
ALT SERPL W P-5'-P-CCNC: 11 U/L (ref 9–52)
ANION GAP SERPL CALCULATED.3IONS-SCNC: 5 MMOL/L (ref 5–14)
AST SERPL W P-5'-P-CCNC: 25 U/L (ref 14–36)
BASOPHILS # BLD AUTO: 0.1 THOUSANDS/ΜL (ref 0–0.1)
BASOPHILS NFR BLD AUTO: 1 % (ref 0–1)
BILIRUB SERPL-MCNC: 1 MG/DL
BUN SERPL-MCNC: 17 MG/DL (ref 5–25)
CALCIUM SERPL-MCNC: 8.9 MG/DL (ref 8.4–10.2)
CHLORIDE SERPL-SCNC: 103 MMOL/L (ref 97–108)
CO2 SERPL-SCNC: 32 MMOL/L (ref 22–30)
CREAT SERPL-MCNC: 0.73 MG/DL (ref 0.6–1.2)
EOSINOPHIL # BLD AUTO: 0.2 THOUSAND/ΜL (ref 0–0.4)
EOSINOPHIL NFR BLD AUTO: 2 % (ref 0–6)
ERYTHROCYTE [DISTWIDTH] IN BLOOD BY AUTOMATED COUNT: 14.5 %
EST. AVERAGE GLUCOSE BLD GHB EST-MCNC: 126 MG/DL
GFR SERPL CREATININE-BSD FRML MDRD: 100 ML/MIN/1.73SQ M
GLUCOSE P FAST SERPL-MCNC: 118 MG/DL (ref 70–99)
HBA1C MFR BLD: 6 %
HCT VFR BLD AUTO: 42.6 % (ref 36–46)
HGB BLD-MCNC: 13.8 G/DL (ref 12–16)
LYMPHOCYTES # BLD AUTO: 2.7 THOUSANDS/ΜL (ref 0.5–4)
LYMPHOCYTES NFR BLD AUTO: 34 % (ref 25–45)
MCH RBC QN AUTO: 26.3 PG (ref 26–34)
MCHC RBC AUTO-ENTMCNC: 32.4 G/DL (ref 31–36)
MCV RBC AUTO: 81 FL (ref 80–100)
MONOCYTES # BLD AUTO: 0.7 THOUSAND/ΜL (ref 0.2–0.9)
MONOCYTES NFR BLD AUTO: 9 % (ref 1–10)
NEUTROPHILS # BLD AUTO: 4.5 THOUSANDS/ΜL (ref 1.8–7.8)
NEUTS SEG NFR BLD AUTO: 55 % (ref 45–65)
PLATELET # BLD AUTO: 276 THOUSANDS/UL (ref 150–450)
PMV BLD AUTO: 10 FL (ref 8.9–12.7)
POTASSIUM SERPL-SCNC: 4 MMOL/L (ref 3.6–5)
PROT SERPL-MCNC: 7.2 G/DL (ref 5.9–8.4)
RBC # BLD AUTO: 5.24 MILLION/UL (ref 4–5.2)
SODIUM SERPL-SCNC: 140 MMOL/L (ref 137–147)
TSH SERPL DL<=0.05 MIU/L-ACNC: 1.52 UIU/ML (ref 0.47–4.68)
WBC # BLD AUTO: 8.2 THOUSAND/UL (ref 4.5–11)

## 2021-03-05 PROCEDURE — 83036 HEMOGLOBIN GLYCOSYLATED A1C: CPT

## 2021-03-05 PROCEDURE — 36415 COLL VENOUS BLD VENIPUNCTURE: CPT

## 2021-03-05 PROCEDURE — 84443 ASSAY THYROID STIM HORMONE: CPT

## 2021-03-05 PROCEDURE — 80053 COMPREHEN METABOLIC PANEL: CPT

## 2021-03-05 PROCEDURE — 85025 COMPLETE CBC W/AUTO DIFF WBC: CPT

## 2021-03-05 PROCEDURE — 73630 X-RAY EXAM OF FOOT: CPT

## 2021-03-06 NOTE — RESULT ENCOUNTER NOTE
Improved hemoglobin A1C  Continue same treatment, Again weight loss, exercise, decreased carbs in diet is the key stone of preventing diabetes

## 2021-03-30 RX ORDER — ALBUTEROL SULFATE 2.5 MG/3ML
2.5 SOLUTION RESPIRATORY (INHALATION) ONCE AS NEEDED
Status: DISCONTINUED | OUTPATIENT
Start: 2021-03-30 | End: 2021-03-30

## 2021-03-31 ENCOUNTER — HOSPITAL ENCOUNTER (OUTPATIENT)
Dept: PULMONOLOGY | Facility: HOSPITAL | Age: 66
Discharge: HOME | End: 2021-03-31
Attending: INTERNAL MEDICINE
Payer: COMMERCIAL

## 2021-03-31 ENCOUNTER — HOSPITAL ENCOUNTER (OUTPATIENT)
Dept: NON INVASIVE DIAGNOSTICS | Facility: HOSPITAL | Age: 66
Discharge: HOME | End: 2021-03-31
Attending: INTERNAL MEDICINE
Payer: COMMERCIAL

## 2021-03-31 DIAGNOSIS — R06.00 DYSPNEA ON EXERTION: ICD-10-CM

## 2021-03-31 PROCEDURE — 93306 TTE W/DOPPLER COMPLETE: CPT

## 2021-03-31 PROCEDURE — 94060 EVALUATION OF WHEEZING: CPT | Performed by: INTERNAL MEDICINE

## 2021-03-31 PROCEDURE — 94760 N-INVAS EAR/PLS OXIMETRY 1: CPT

## 2021-03-31 PROCEDURE — 94729 DIFFUSING CAPACITY: CPT

## 2021-03-31 PROCEDURE — 94060 EVALUATION OF WHEEZING: CPT

## 2021-03-31 PROCEDURE — 94729 DIFFUSING CAPACITY: CPT | Performed by: INTERNAL MEDICINE

## 2021-03-31 PROCEDURE — 94726 PLETHYSMOGRAPHY LUNG VOLUMES: CPT

## 2021-03-31 PROCEDURE — 94726 PLETHYSMOGRAPHY LUNG VOLUMES: CPT | Performed by: INTERNAL MEDICINE

## 2021-03-31 RX ORDER — ALBUTEROL SULFATE 2.5 MG/3ML
2.5 SOLUTION RESPIRATORY (INHALATION) ONCE AS NEEDED
Status: COMPLETED | OUTPATIENT
Start: 2021-03-31 | End: 2021-03-31

## 2021-03-31 RX ADMIN — ALBUTEROL SULFATE 2.5 MG: 2.5 SOLUTION RESPIRATORY (INHALATION) at 08:30

## 2021-04-05 ENCOUNTER — HOSPITAL ENCOUNTER (OUTPATIENT)
Dept: BONE DENSITY | Facility: CLINIC | Age: 66
Discharge: HOME | End: 2021-04-05
Payer: COMMERCIAL

## 2021-04-05 ENCOUNTER — HOSPITAL ENCOUNTER (OUTPATIENT)
Dept: MAMMOGRAPHY | Facility: CLINIC | Age: 66
Discharge: HOME | End: 2021-04-05
Payer: COMMERCIAL

## 2021-04-05 VITALS — BODY MASS INDEX: 40.12 KG/M2 | HEIGHT: 62 IN | WEIGHT: 218 LBS

## 2021-04-05 DIAGNOSIS — Z12.31 ENCOUNTER FOR SCREENING MAMMOGRAM FOR MALIGNANT NEOPLASM OF BREAST: ICD-10-CM

## 2021-04-05 DIAGNOSIS — Z78.0 POSTMENOPAUSAL: ICD-10-CM

## 2021-04-05 PROCEDURE — 77063 BREAST TOMOSYNTHESIS BI: CPT

## 2021-04-05 PROCEDURE — 77080 DXA BONE DENSITY AXIAL: CPT

## 2021-04-05 PROCEDURE — 77067 SCR MAMMO BI INCL CAD: CPT

## 2021-04-09 ENCOUNTER — OFFICE VISIT (OUTPATIENT)
Dept: PULMONOLOGY | Facility: CLINIC | Age: 66
End: 2021-04-09
Payer: COMMERCIAL

## 2021-04-09 VITALS
HEART RATE: 54 BPM | HEIGHT: 62 IN | DIASTOLIC BLOOD PRESSURE: 58 MMHG | RESPIRATION RATE: 18 BRPM | OXYGEN SATURATION: 95 % | BODY MASS INDEX: 40.12 KG/M2 | TEMPERATURE: 98.1 F | WEIGHT: 218 LBS | SYSTOLIC BLOOD PRESSURE: 110 MMHG

## 2021-04-09 DIAGNOSIS — J98.4 RESTRICTIVE LUNG DISEASE: Primary | ICD-10-CM

## 2021-04-09 PROCEDURE — 99214 OFFICE O/P EST MOD 30 MIN: CPT | Performed by: INTERNAL MEDICINE

## 2021-04-09 NOTE — PROGRESS NOTES
Pulmonary Follow Up Note   Pranav Phan 73 y o  female MRN: [id number]  4/9/2021    Assessment:  Restrictive lung disease/abnormal CT   · No signs of pulmonary hypertension on repeat TTE, also on TTE in 05/2019   · CT chest with peripheral GGOs/mild sub pleural/basal predominance, with a PFT with restrictive physiology, TLC 65%   · This is possibly interstitial lung disease, however no high-resolution CT chest   · No signs of exertional hypoxemia on 6 minutes walk test checked that last visit  · Negative family history for pulmonary fibrosis, no significant occupational/environmental hazards exposure     Plan:   · No specific therapy required at this moment   · Will check high-resolution CT chest, will check rheumatologic serology after that   · Encouraged to continue working on weight loss/diet management    Allergic rhinitis  · Possibly associated with reactive airway disease given the symptomatic improvement of chest tightness with p r n  albuterol   · No significant response on bronchodilator on PFT  · Continue p r n  albuterol, Flonase      Return in about 3 months (around 7/9/2021)  History of Present Illness   Background   43-year-old female with a history of reflux, morbid obesity, DM2, seasonal allergy and reported asthma who was initially evaluated by Pulmonary in 01/2021 due to concern for pulmonary hypertension  At last visit-6 minutes walk test showed no exertional hypoxemia  TTE did not show signs of pulmonary hypertension, has possibly elevated LA pressure is but nothing else significant PFT was consistent with restrictive lung disease    Interval history  Since last seen, no new symptoms or events  Continues to be active and independent at baseline, trying to lose weight  Using p r n  albuterol intermittently for chest tightness/allergic symptoms with improvement  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        Review of Systems  As per hpi, all other systems reviewed and were negative      TTE 05/15/2019-EF 63%  Mild LV hypertrophy  Pulmonary artery systolic pressure estimated to be normal, normal size  TAPSE 2 51    TTE 03/31/2021-EF 65%, no WMA, mild increase wall thickness/eccentric hypertrophy, mildly dilated LA with septum moved very mildly bowing from the left to the right suggesting increased LA pressure trace regurgitation at the mitral valve  Estimated PA SP of 22 mm mercury  Very small/physiologic pericardial effusion  TAPSE 3 8    PFT 01/27/2021-ratio of 82%, FEV1 1 33 L/67%, FVC 1 63 L/65%, no significant response to bronchodilator  TLC reduced at 64%, RV 59%  DLCO 69% of predicted     CT chest 10/07/2020-no pulmonary embolism  Peripheral ground-glass opacities, some subpleural reticulation at the bases      6 minutes walk test 01/27/2021-baseline 96% on room air, heart rate 61  Able to walk 420 m in 6 minutes, lowest SpO2 92% on room air, maximal heart rate 101        Past medical, surgical, social and family histories reviewed  Medications/Allergies: Reviewed      Vitals: Blood pressure 110/58, pulse (!) 54, temperature 98 1 °F (36 7 °C), resp  rate 18, height 5' 2" (1 575 m), weight 98 9 kg (218 lb), SpO2 95 %, not currently breastfeeding  Body mass index is 39 87 kg/m²  Oxygen Therapy  SpO2: 95 %  Oxygen Therapy: None (Room air)      Physical Exam  Body mass index is 39 87 kg/m²    Gen: not in acute distress, obese  HEENT: supple, no adenopathy, PERRLA  Chest: normal respiratory efforts, clear breath sounds bilaterally  CV: RRR, no murmurs appreciated  Abdomen: soft, non tender, normal bowel sounds  Extremities:  Trace above ankle edema  Skin: unremarkable  Neuro: AAO X3, no focal motor deficit      Labs:  Lab Results   Component Value Date    WBC 8 20 03/05/2021    HGB 13 8 03/05/2021    HCT 42 6 03/05/2021    MCV 81 03/05/2021     03/05/2021     Lab Results   Component Value Date    CALCIUM 8 9 03/05/2021    K 4 0 03/05/2021    CO2 32 (H) 03/05/2021     03/05/2021    BUN 17 03/05/2021    CREATININE 0 73 03/05/2021     No results found for: IGE  Lab Results   Component Value Date    ALT 11 03/05/2021    AST 25 03/05/2021    ALKPHOS 70 03/05/2021         Imaging and other studies: I have personally reviewed pertinent films in PACS      Pulmonary function testing:       EKG, Pathology, and Other Studies: I have personally reviewed pertinent reports  Portions of the record may have been created with voice recognition software  Occasional wrong word or "sound a like" substitutions may have occurred due to the inherent limitations of voice recognition software  Read the chart carefully and recognize, using context, where substitutions have occurred    [facility]

## 2021-04-19 NOTE — RESULT ENCOUNTER NOTE
Do Frax score, check if fracture was pathological or osteoporotic   If either positive treat as osteoporosis,

## 2021-04-24 ENCOUNTER — HOSPITAL ENCOUNTER (OUTPATIENT)
Dept: CT IMAGING | Facility: HOSPITAL | Age: 66
Discharge: HOME | End: 2021-04-24
Attending: INTERNAL MEDICINE
Payer: COMMERCIAL

## 2021-04-24 DIAGNOSIS — J98.4 RESTRICTIVE LUNG DISEASE: ICD-10-CM

## 2021-04-24 PROCEDURE — G1004 CDSM NDSC: HCPCS

## 2021-04-24 PROCEDURE — 71250 CT THORAX DX C-: CPT

## 2021-05-11 ENCOUNTER — TELEPHONE (OUTPATIENT)
Dept: FAMILY MEDICINE CLINIC | Facility: CLINIC | Age: 66
End: 2021-05-11

## 2021-05-11 DIAGNOSIS — M85.80 OSTEOPENIA, UNSPECIFIED LOCATION: Primary | ICD-10-CM

## 2021-05-11 RX ORDER — PHENOL 1.4 %
600 AEROSOL, SPRAY (ML) MUCOUS MEMBRANE 2 TIMES DAILY WITH MEALS
Qty: 60 TABLET | Refills: 5 | Status: SHIPPED | OUTPATIENT
Start: 2021-05-11 | End: 2021-12-02 | Stop reason: ALTCHOICE

## 2021-05-11 RX ORDER — MELATONIN
1000 DAILY
Qty: 30 TABLET | Refills: 5 | Status: SHIPPED | OUTPATIENT
Start: 2021-05-11

## 2021-05-19 ENCOUNTER — OFFICE VISIT (OUTPATIENT)
Dept: FAMILY MEDICINE CLINIC | Facility: CLINIC | Age: 66
End: 2021-05-19
Payer: COMMERCIAL

## 2021-05-19 VITALS
HEIGHT: 62 IN | RESPIRATION RATE: 16 BRPM | BODY MASS INDEX: 39.01 KG/M2 | DIASTOLIC BLOOD PRESSURE: 70 MMHG | SYSTOLIC BLOOD PRESSURE: 138 MMHG | WEIGHT: 212 LBS | HEART RATE: 88 BPM | TEMPERATURE: 98 F | OXYGEN SATURATION: 95 %

## 2021-05-19 DIAGNOSIS — E78.2 MIXED HYPERLIPIDEMIA: ICD-10-CM

## 2021-05-19 DIAGNOSIS — J30.1 SEASONAL ALLERGIC RHINITIS DUE TO POLLEN: Primary | ICD-10-CM

## 2021-05-19 PROBLEM — E66.01 CLASS 3 SEVERE OBESITY DUE TO EXCESS CALORIES WITH SERIOUS COMORBIDITY AND BODY MASS INDEX (BMI) OF 40.0 TO 44.9 IN ADULT (HCC): Status: RESOLVED | Noted: 2018-11-15 | Resolved: 2021-05-19

## 2021-05-19 PROBLEM — B34.9 VIRAL ILLNESS: Status: RESOLVED | Noted: 2021-01-13 | Resolved: 2021-05-19

## 2021-05-19 PROBLEM — Z12.31 ENCOUNTER FOR SCREENING MAMMOGRAM FOR MALIGNANT NEOPLASM OF BREAST: Status: RESOLVED | Noted: 2021-01-13 | Resolved: 2021-05-19

## 2021-05-19 PROBLEM — Z00.01 ENCOUNTER FOR GENERAL ADULT MEDICAL EXAMINATION WITH ABNORMAL FINDINGS: Status: RESOLVED | Noted: 2018-11-15 | Resolved: 2021-05-19

## 2021-05-19 PROBLEM — D72.829 LEUKOCYTOSIS: Status: RESOLVED | Noted: 2020-12-01 | Resolved: 2021-05-19

## 2021-05-19 PROBLEM — R79.89 ELEVATED LIVER FUNCTION TESTS: Status: RESOLVED | Noted: 2020-12-01 | Resolved: 2021-05-19

## 2021-05-19 PROBLEM — E66.813 CLASS 3 SEVERE OBESITY DUE TO EXCESS CALORIES WITH SERIOUS COMORBIDITY AND BODY MASS INDEX (BMI) OF 40.0 TO 44.9 IN ADULT (HCC): Status: RESOLVED | Noted: 2018-11-15 | Resolved: 2021-05-19

## 2021-05-19 PROBLEM — Z78.0 POSTMENOPAUSAL: Status: RESOLVED | Noted: 2021-01-13 | Resolved: 2021-05-19

## 2021-05-19 PROCEDURE — 99213 OFFICE O/P EST LOW 20 MIN: CPT | Performed by: PHYSICIAN ASSISTANT

## 2021-05-19 NOTE — LETTER
May 19, 2021     Patient: Shaila Flores   YOB: 1955   Date of Visit: 5/19/2021       To Whom it May Concern:     Shaila Flores is under my professional care  She was seen in my office on 5/19/2021  She may return to work on 5/24/21  If you have any questions or concerns, please don't hesitate to call           Sincerely,          Josefa Ramirez PA-C

## 2021-05-19 NOTE — ASSESSMENT & PLAN NOTE
Allergy symptoms have been bothering her more than usual since this weekend  She has indoor/outdoor allergies and takes daily Allegra  She has albuterol and ventolin inhalers for asthma, which she reports she has been using daily since the weekend, with improvement in her symptoms  She was previously coughing and sneezing a lot, to the point that she had some rib soreness, however she started taking tessalon perles and her cough is much improved  Denies fever, chills, chest pain, abdominal pain, N/V/D, change in taste or smell  Mild expiratory wheezing heard at lung bases bilaterally  No SOB, nostril flaring, accessory muscle use  O2 sat normal     Offered a short course of oral prednisone to help her symptoms, however Wanda declined since prednisone elevates her blood sugar  I recommended that she continue to use her antihistamine medication daily and inhalers as needed  Instructed her to call the office if she changes her mind about the prednisone or develops fever or worsening shortness of breath

## 2021-05-19 NOTE — PROGRESS NOTES
Assessment/Plan:    Seasonal allergic rhinitis due to pollen  Allergy symptoms have been bothering her more than usual since this weekend  She has indoor/outdoor allergies and takes daily Allegra  She has albuterol and ventolin inhalers for asthma, which she reports she has been using daily since the weekend, with improvement in her symptoms  She was previously coughing and sneezing a lot, to the point that she had some rib soreness, however she started taking tessalon perles and her cough is much improved  Denies fever, chills, chest pain, abdominal pain, N/V/D, change in taste or smell  Mild expiratory wheezing heard at lung bases bilaterally  No SOB, nostril flaring, accessory muscle use  O2 sat normal     Offered a short course of oral prednisone to help her symptoms, however Wanda declined since prednisone elevates her blood sugar  I recommended that she continue to use her antihistamine medication daily and inhalers as needed  Instructed her to call the office if she changes her mind about the prednisone or develops fever or worsening shortness of breath  Diagnoses and all orders for this visit:    Seasonal allergic rhinitis due to pollen    BMI 38 0-38 9,adult    Mixed hyperlipidemia  -     Lipid panel; Future          Subjective:      Patient ID: Norberto Zarate is a 73 y o  female  [address] presents to the office today for evaluation of worsening allergies since the weekend  No other acute complaints today  The following portions of the patient's history were reviewed and updated as appropriate: allergies, current medications, past family history, past medical history, past social history, past surgical history and problem list     Review of Systems   Constitutional: Negative for chills and fever  HENT: Positive for sneezing and voice change (hoarse)  Negative for congestion, postnasal drip, rhinorrhea, sinus pressure, sinus pain, sore throat and trouble swallowing      Eyes: Negative for pain, redness and itching  Respiratory: Positive for cough and wheezing  Negative for shortness of breath  Cardiovascular: Negative for chest pain  Gastrointestinal: Negative for abdominal pain, constipation, diarrhea, nausea and vomiting  Neurological: Negative for headaches  Psychiatric/Behavioral: Negative for sleep disturbance  Objective:      /70 (BP Location: Left arm, Patient Position: Sitting, Cuff Size: Standard)   Pulse 88   Temp 98 °F (36 7 °C) (Temporal)   Resp 16   Ht 5' 2" (1 575 m)   Wt 96 2 kg (212 lb)   SpO2 95%   Breastfeeding No   BMI 38 78 kg/m²          Physical Exam  Vitals signs reviewed  Constitutional:       General: She is not in acute distress  Appearance: Normal appearance  She is not toxic-appearing  HENT:      Head: Normocephalic and atraumatic  Eyes:      Extraocular Movements: Extraocular movements intact  Conjunctiva/sclera: Conjunctivae normal    Neck:      Musculoskeletal: Normal range of motion  Cardiovascular:      Rate and Rhythm: Normal rate and regular rhythm  Pulses: Normal pulses  Heart sounds: Normal heart sounds  No murmur  Pulmonary:      Effort: Pulmonary effort is normal  No respiratory distress  Breath sounds: Examination of the right-lower field reveals wheezing  Examination of the left-lower field reveals wheezing  Wheezing present  Musculoskeletal: Normal range of motion  Skin:     General: Skin is warm and dry  Neurological:      Mental Status: She is alert and oriented to person, place, and time  Psychiatric:         Mood and Affect: Mood normal          Behavior: Behavior normal          Thought Content:  Thought content normal

## 2021-05-29 ENCOUNTER — APPOINTMENT (OUTPATIENT)
Dept: LAB | Facility: HOSPITAL | Age: 66
End: 2021-05-29
Payer: COMMERCIAL

## 2021-05-29 DIAGNOSIS — E78.2 MIXED HYPERLIPIDEMIA: ICD-10-CM

## 2021-05-29 LAB
CHOLEST SERPL-MCNC: 154 MG/DL
HDLC SERPL-MCNC: 40 MG/DL
LDLC SERPL CALC-MCNC: 98 MG/DL
NONHDLC SERPL-MCNC: 114 MG/DL
TRIGL SERPL-MCNC: 81 MG/DL

## 2021-05-29 PROCEDURE — 36415 COLL VENOUS BLD VENIPUNCTURE: CPT

## 2021-05-29 PROCEDURE — 80061 LIPID PANEL: CPT

## 2021-06-10 DIAGNOSIS — I10 HYPERTENSION, UNSPECIFIED TYPE: ICD-10-CM

## 2021-06-10 RX ORDER — ROSUVASTATIN CALCIUM 5 MG/1
5 TABLET, COATED ORAL DAILY
Qty: 90 TABLET | Refills: 3 | Status: SHIPPED | OUTPATIENT
Start: 2021-06-10

## 2021-06-10 RX ORDER — OLMESARTAN MEDOXOMIL 40 MG/1
40 TABLET ORAL DAILY
Qty: 90 TABLET | Refills: 3 | Status: SHIPPED | OUTPATIENT
Start: 2021-06-10 | End: 2022-06-27

## 2021-06-15 DIAGNOSIS — I10 HYPERTENSION, UNSPECIFIED TYPE: ICD-10-CM

## 2021-06-15 RX ORDER — HYDROCHLOROTHIAZIDE 25 MG/1
25 TABLET ORAL EVERY MORNING
Qty: 90 TABLET | Refills: 3 | Status: SHIPPED | OUTPATIENT
Start: 2021-06-15 | End: 2021-12-02 | Stop reason: SDUPTHER

## 2021-07-02 ENCOUNTER — OFFICE VISIT (OUTPATIENT)
Dept: PULMONOLOGY | Facility: CLINIC | Age: 66
End: 2021-07-02
Payer: COMMERCIAL

## 2021-07-02 VITALS
DIASTOLIC BLOOD PRESSURE: 60 MMHG | HEIGHT: 62 IN | OXYGEN SATURATION: 98 % | WEIGHT: 214 LBS | SYSTOLIC BLOOD PRESSURE: 110 MMHG | TEMPERATURE: 96.8 F | BODY MASS INDEX: 39.38 KG/M2 | RESPIRATION RATE: 18 BRPM | HEART RATE: 61 BPM

## 2021-07-02 DIAGNOSIS — J84.9 INTERSTITIAL LUNG DISEASE (HCC): Primary | ICD-10-CM

## 2021-07-02 DIAGNOSIS — R91.8 ABNORMAL CT SCAN OF LUNG: ICD-10-CM

## 2021-07-02 DIAGNOSIS — J30.2 SEASONAL ALLERGIC RHINITIS, UNSPECIFIED TRIGGER: ICD-10-CM

## 2021-07-02 DIAGNOSIS — J41.0 SIMPLE CHRONIC BRONCHITIS (HCC): ICD-10-CM

## 2021-07-02 PROCEDURE — 99214 OFFICE O/P EST MOD 30 MIN: CPT | Performed by: INTERNAL MEDICINE

## 2021-07-02 RX ORDER — TIOTROPIUM BROMIDE INHALATION SPRAY 3.12 UG/1
2 SPRAY, METERED RESPIRATORY (INHALATION) DAILY
Qty: 4 G | Refills: 5 | Status: SHIPPED | COMMUNITY
Start: 2021-07-02 | End: 2021-10-27

## 2021-07-02 NOTE — PROGRESS NOTES
Pulmonary Follow Up Note   Christina Shaver 73 y o  female MRN: [id number]  7/2/2021    Assessment:  Interstitial lung disease   · Unclear etiology, no significant occupational exposure, or signs or symptoms of active rheumatologic process  · Last TLC 65%, RV of 59%, sub selected component from obesity to the restriction  · CT chest/high-resolution very mild basal/subpleural reticulations  · No signs of exertional hypoxemia on 6 minutes walk test    Plan:   · Will check rheumatologic serology, BRENDA/reflex 11 biomarkers, Anca, RF/CCP, all the lease/CK  · Complete pulmonary function test/6 minutes walk test, will trend lung volumes/DLCO may consider further workup/treatment if noted progressive decline     Allergic rhinitis/chronic bronchitis   · Continues with mild productive cough, chronic   · Improve with p r n  albuterol which she uses about 4 times per week   · No significant response to bronchodilator or signs of obstruction  · Has about 10 pack year tobacco abuse history quit in 2009   · Given the frequent use of albuterol, will give a trial of Lama, Spiriva 2 5 Respimat      Abnormal CT chest/noted enlargement of pulmonary artery   · On repeat CT chest, measuring about 3 centimetre   · TTE x2 showed no signs of PA pressure is elevated, estimated PA SP of 22 mm mercury  · Repeat TTE 6 months from the last 1 to check for changes, if had signs of pulmonary hypertension, will consider full workup    Return in about 3 months (around 10/2/2021)  History of Present Illness     Follow up for:  Interstitial lung disease    Background:  50-year-old female with a history of reflux, morbid obesity, DM2, seasonal allergies  She was initially referred to Pulmonary for evaluation of possible pulmonary hypertension, noted enlarged PA on CT chest   Repeat TTE showed no signs of pulmonary hypertension, however noted peripheral GGOs/sub pleural nasal predominance, moderately reduced TLC at 65%    No exertional hypoxemia, negative family history of lung fibrosis, occupational/environmental hazards exposure  Interval history  No major events or illness clear it continues to use p r n  albuterol about 4 times per week for chest tightness/allergic symptoms  No new dyspnea or chest pain CT chest showed a very mild/subtle fibrosis at the bases  Denies any skin lesions, arthralgia, arthritis, muscle pain clear it no personal or family history of rheumatologic disorder or lung fibrosis in her family  Review of Systems  As per hpi, all other systems reviewed and were negative    Studies:    Imaging and other studies: I have personally reviewed pertinent films in PACS  CT chest/high-resolution 2021-minimal sub pleural/basal predominant reticulations, ground-glass opacities unchanged from 10/2020  CT chest 10/07/2020-no pulmonary embolism   Peripheral ground-glass opacities, some subpleural reticulation at the bases        Pulmonary function testin minutes walk test 2021-baseline 96% on room air, heart rate 61  Able to walk 420 m in 6 minutes, lowest SpO2 92% on room air, maximal heart rate 101     PFT 2021-ratio of 82%, FEV1 1 33 L/67%, FVC 1 63 L/65%, no significant response to bronchodilator  TLC reduced at 64%, RV 59%  DLCO 69% of predicted     EKG, Pathology, and Other Studies: I have personally reviewed pertinent reports  TTE 05/15/2019-EF 63%   Mild LV hypertrophy   Pulmonary artery systolic pressure estimated to be normal, normal size   TAPSE 2 51     TTE 2021-EF 65%, no WMA, mild increase wall thickness/eccentric hypertrophy, mildly dilated LA with septum moved very mildly bowing from the left to the right suggesting increased LA pressure trace regurgitation at the mitral valve  Estimated PA SP of 22 mm mercury  Very small/physiologic pericardial effusion  TAPSE 3 8           Past medical, surgical, social and family histories reviewed        Medications/Allergies: Reviewed      Vitals: Blood pressure 110/60, pulse 61, temperature (!) 96 8 °F (36 °C), resp  rate 18, height 5' 2" (1 575 m), weight 97 1 kg (214 lb), SpO2 98 %, not currently breastfeeding  Body mass index is 39 14 kg/m²  Oxygen Therapy  SpO2: 98 %  Oxygen Therapy: None (Room air)      Physical Exam  Body mass index is 39 14 kg/m²  Gen: not in acute distress, obese  HEENT: supple, no adenopathy, PERRLA  Chest: normal respiratory efforts, clear breath sounds bilaterally  CV: RRR, no murmurs appreciated  Abdomen: soft, non tender, normal bowel sounds  Extremities: no edema  Skin: unremarkable  Neuro: AAO X3, no focal motor deficit      Labs:  Lab Results   Component Value Date    WBC 8 20 03/05/2021    HGB 13 8 03/05/2021    HCT 42 6 03/05/2021    MCV 81 03/05/2021     03/05/2021     Lab Results   Component Value Date    CALCIUM 8 9 03/05/2021    K 4 0 03/05/2021    CO2 32 (H) 03/05/2021     03/05/2021    BUN 17 03/05/2021    CREATININE 0 73 03/05/2021     No results found for: IGE  Lab Results   Component Value Date    ALT 11 03/05/2021    AST 25 03/05/2021    ALKPHOS 70 03/05/2021           Portions of the record may have been created with voice recognition software  Occasional wrong word or "sound a like" substitutions may have occurred due to the inherent limitations of voice recognition software  Read the chart carefully and recognize, using context, where substitutions have occurred    [facility]

## 2021-07-15 ENCOUNTER — CLINICAL SUPPORT (OUTPATIENT)
Dept: DENTISTRY | Facility: CLINIC | Age: 66
End: 2021-07-15

## 2021-07-15 VITALS — DIASTOLIC BLOOD PRESSURE: 72 MMHG | HEART RATE: 61 BPM | SYSTOLIC BLOOD PRESSURE: 109 MMHG | TEMPERATURE: 97.3 F

## 2021-07-15 DIAGNOSIS — K03.6 ACCRETIONS ON TEETH: ICD-10-CM

## 2021-07-15 DIAGNOSIS — Z01.20 ENCOUNTER FOR DENTAL EXAMINATION: Primary | ICD-10-CM

## 2021-07-15 DIAGNOSIS — K03.6 DENTAL CALCULUS: ICD-10-CM

## 2021-07-15 DIAGNOSIS — K02.9 DENTAL CARIES: ICD-10-CM

## 2021-07-15 DIAGNOSIS — Z01.21 ENCOUNTER FOR DENTAL EXAMINATION AND CLEANING WITH ABNORMAL FINDINGS: ICD-10-CM

## 2021-07-15 PROCEDURE — D1110 PROPHYLAXIS - ADULT: HCPCS

## 2021-07-15 PROCEDURE — D0120 PERIODIC ORAL EVALUATION - ESTABLISHED PATIENT: HCPCS | Performed by: DENTIST

## 2021-07-15 NOTE — PROGRESS NOTES
Adult Prophy     Exams:  Periodic exam and perio charted  Xrays:     None  Take FMX or PAN next visit  Type of Treatment:  Adult Prophy -  Hand scaling,  Polished, Flossed  Reviewed OHI  Brush:  2X/day and Floss 1X/day  Recommended Listerine   EO/OCS Exams:  No significant findings  IO: No significant findings  Occlusion:  Oral Hygiene:  Good  Plaque:  Light   Calculus:  Light  / Moderate  / Heavy  Bleeding:  none  Gingiva:  Pink  / Firm  / Stippled/    Stain:  Light    Perio Charting:  Periocharting was completed and evaluated  gen 3  mm w/  no     BUP  Perio Findings:  Stage 2 Grade B  Caries Findings:  Open margins # 8, 10  Caries Risk Assessment:     Mod   caries risk    Treatment Plan:  Updated    Dr  Exam:  Dr Amie Ribeiro  Referral:  No referral given     NV:  6  Month Recall   Needs FMX taken unless pano is taken for implant consult   she has appt   for consult with Dr Long Roblero for implants   She is considering implants or RPDs  NV2  jose manuel # 8,10

## 2021-08-09 DIAGNOSIS — E03.9 HYPOTHYROIDISM (ACQUIRED): ICD-10-CM

## 2021-08-10 RX ORDER — LEVOTHYROXINE SODIUM 0.05 MG/1
TABLET ORAL
Qty: 90 TABLET | Refills: 0 | Status: SHIPPED | OUTPATIENT
Start: 2021-08-10 | End: 2021-12-02 | Stop reason: SDUPTHER

## 2021-09-07 RX ORDER — ALBUTEROL SULFATE 2.5 MG/3ML
2.5 SOLUTION RESPIRATORY (INHALATION) ONCE AS NEEDED
Status: CANCELLED | OUTPATIENT
Start: 2021-09-07

## 2021-09-08 ENCOUNTER — HOSPITAL ENCOUNTER (OUTPATIENT)
Dept: PULMONOLOGY | Facility: HOSPITAL | Age: 66
Discharge: HOME | End: 2021-09-08
Attending: INTERNAL MEDICINE
Payer: COMMERCIAL

## 2021-09-08 DIAGNOSIS — J84.9 INTERSTITIAL LUNG DISEASE (HCC): ICD-10-CM

## 2021-09-08 PROCEDURE — 94060 EVALUATION OF WHEEZING: CPT

## 2021-09-08 PROCEDURE — 94761 N-INVAS EAR/PLS OXIMETRY MLT: CPT

## 2021-09-08 PROCEDURE — 94729 DIFFUSING CAPACITY: CPT | Performed by: INTERNAL MEDICINE

## 2021-09-08 PROCEDURE — 94060 EVALUATION OF WHEEZING: CPT | Performed by: INTERNAL MEDICINE

## 2021-09-08 PROCEDURE — 94726 PLETHYSMOGRAPHY LUNG VOLUMES: CPT | Performed by: INTERNAL MEDICINE

## 2021-09-08 PROCEDURE — 94618 PULMONARY STRESS TESTING: CPT | Performed by: INTERNAL MEDICINE

## 2021-09-08 PROCEDURE — 94729 DIFFUSING CAPACITY: CPT

## 2021-09-08 PROCEDURE — 94726 PLETHYSMOGRAPHY LUNG VOLUMES: CPT

## 2021-09-08 RX ORDER — ALBUTEROL SULFATE 2.5 MG/3ML
SOLUTION RESPIRATORY (INHALATION)
Status: COMPLETED
Start: 2021-09-08 | End: 2021-09-08

## 2021-09-08 RX ADMIN — ALBUTEROL SULFATE 2.5 MG: 2.5 SOLUTION RESPIRATORY (INHALATION) at 14:25

## 2021-09-14 ENCOUNTER — APPOINTMENT (OUTPATIENT)
Dept: LAB | Facility: HOSPITAL | Age: 66
End: 2021-09-14
Attending: INTERNAL MEDICINE
Payer: COMMERCIAL

## 2021-09-14 DIAGNOSIS — J84.9 INTERSTITIAL LUNG DISEASE (HCC): ICD-10-CM

## 2021-09-14 LAB — CK SERPL-CCNC: 84 U/L (ref 30–135)

## 2021-09-14 PROCEDURE — 82550 ASSAY OF CK (CPK): CPT

## 2021-09-14 PROCEDURE — 86430 RHEUMATOID FACTOR TEST QUAL: CPT

## 2021-09-14 PROCEDURE — 86225 DNA ANTIBODY NATIVE: CPT

## 2021-09-14 PROCEDURE — 86235 NUCLEAR ANTIGEN ANTIBODY: CPT

## 2021-09-14 PROCEDURE — 86038 ANTINUCLEAR ANTIBODIES: CPT

## 2021-09-14 PROCEDURE — 36415 COLL VENOUS BLD VENIPUNCTURE: CPT

## 2021-09-14 PROCEDURE — 83516 IMMUNOASSAY NONANTIBODY: CPT

## 2021-09-14 PROCEDURE — 86255 FLUORESCENT ANTIBODY SCREEN: CPT

## 2021-09-14 PROCEDURE — 86200 CCP ANTIBODY: CPT

## 2021-09-14 PROCEDURE — 82085 ASSAY OF ALDOLASE: CPT

## 2021-09-15 LAB
ALDOLASE SERPL-CCNC: 4.4 U/L (ref 3.3–10.3)
RHEUMATOID FACT SER QL LA: NEGATIVE
RYE IGE QN: NEGATIVE

## 2021-09-16 LAB
C-ANCA TITR SER IF: NORMAL TITER
MYELOPEROXIDASE AB SER IA-ACNC: <9 U/ML (ref 0–9)
P-ANCA ATYPICAL TITR SER IF: NORMAL TITER
P-ANCA TITR SER IF: NORMAL TITER
PROTEINASE3 AB SER IA-ACNC: <3.5 U/ML (ref 0–3.5)

## 2021-09-17 LAB — CCP AB SER IA-ACNC: 1.3

## 2021-09-20 LAB — MISCELLANEOUS LAB TEST RESULT: NORMAL

## 2021-10-09 ENCOUNTER — IMMUNIZATIONS (OUTPATIENT)
Dept: FAMILY MEDICINE CLINIC | Facility: HOSPITAL | Age: 66
End: 2021-10-09

## 2021-10-09 DIAGNOSIS — Z23 ENCOUNTER FOR IMMUNIZATION: Primary | ICD-10-CM

## 2021-10-09 PROCEDURE — 0001A SARS-COV-2 / COVID-19 MRNA VACCINE (PFIZER-BIONTECH) 30 MCG: CPT

## 2021-10-09 PROCEDURE — 91300 SARS-COV-2 / COVID-19 MRNA VACCINE (PFIZER-BIONTECH) 30 MCG: CPT

## 2021-10-14 ENCOUNTER — OFFICE VISIT (OUTPATIENT)
Dept: DENTISTRY | Facility: CLINIC | Age: 66
End: 2021-10-14

## 2021-10-14 VITALS — TEMPERATURE: 96.8 F | DIASTOLIC BLOOD PRESSURE: 77 MMHG | SYSTOLIC BLOOD PRESSURE: 122 MMHG | HEART RATE: 67 BPM

## 2021-10-14 DIAGNOSIS — Z01.21 ENCOUNTER FOR DENTAL EXAMINATION AND CLEANING WITH ABNORMAL FINDINGS: Primary | ICD-10-CM

## 2021-10-14 PROCEDURE — D2331 RESIN-BASED COMPOSITE - 2 SURFACES, ANTERIOR: HCPCS | Performed by: DENTIST

## 2021-10-14 PROCEDURE — D2332 RESIN-BASED COMPOSITE - 3 SURFACES, ANTERIOR: HCPCS | Performed by: DENTIST

## 2021-10-24 ENCOUNTER — TELEPHONE (OUTPATIENT)
Dept: OTHER | Facility: OTHER | Age: 66
End: 2021-10-24

## 2021-10-27 ENCOUNTER — OFFICE VISIT (OUTPATIENT)
Dept: PULMONOLOGY | Facility: CLINIC | Age: 66
End: 2021-10-27
Payer: COMMERCIAL

## 2021-10-27 VITALS
RESPIRATION RATE: 18 BRPM | HEIGHT: 62 IN | DIASTOLIC BLOOD PRESSURE: 64 MMHG | WEIGHT: 213 LBS | TEMPERATURE: 97.2 F | SYSTOLIC BLOOD PRESSURE: 110 MMHG | HEART RATE: 58 BPM | OXYGEN SATURATION: 94 % | BODY MASS INDEX: 39.2 KG/M2

## 2021-10-27 DIAGNOSIS — J30.2 SEASONAL ALLERGIC RHINITIS, UNSPECIFIED TRIGGER: Primary | ICD-10-CM

## 2021-10-27 DIAGNOSIS — J45.909 REACTIVE AIRWAY DISEASE WITHOUT COMPLICATION, UNSPECIFIED ASTHMA SEVERITY, UNSPECIFIED WHETHER PERSISTENT: ICD-10-CM

## 2021-10-27 DIAGNOSIS — J98.4 RESTRICTIVE LUNG DISEASE: ICD-10-CM

## 2021-10-27 DIAGNOSIS — J84.9 INTERSTITIAL LUNG DISEASE (HCC): ICD-10-CM

## 2021-10-27 DIAGNOSIS — I28.8 ENLARGED PULMONARY ARTERY (HCC): ICD-10-CM

## 2021-10-27 PROCEDURE — 99215 OFFICE O/P EST HI 40 MIN: CPT | Performed by: INTERNAL MEDICINE

## 2021-11-18 ENCOUNTER — HOSPITAL ENCOUNTER (OUTPATIENT)
Dept: NON INVASIVE DIAGNOSTICS | Facility: HOSPITAL | Age: 66
Discharge: HOME | End: 2021-11-18
Attending: INTERNAL MEDICINE
Payer: COMMERCIAL

## 2021-11-18 VITALS
HEIGHT: 62 IN | DIASTOLIC BLOOD PRESSURE: 64 MMHG | SYSTOLIC BLOOD PRESSURE: 110 MMHG | WEIGHT: 213 LBS | HEART RATE: 61 BPM | BODY MASS INDEX: 39.2 KG/M2

## 2021-11-18 DIAGNOSIS — J84.9 INTERSTITIAL LUNG DISEASE (HCC): ICD-10-CM

## 2021-11-18 LAB
AORTIC ROOT: 2.8 CM
APICAL FOUR CHAMBER EJECTION FRACTION: 54 %
ASCENDING AORTA: 3.2 CM
E WAVE DECELERATION TIME: 215 MS
FRACTIONAL SHORTENING: 54 % (ref 28–44)
INTERVENTRICULAR SEPTUM IN DIASTOLE (PARASTERNAL SHORT AXIS VIEW): 0.6 CM
LAAS-AP4: 16.7 CM2
LEFT INTERNAL DIMENSION IN SYSTOLE: 2.2 CM (ref 2.1–4)
LEFT VENTRICULAR INTERNAL DIMENSION IN DIASTOLE: 4.8 CM (ref 5.42–8.07)
LEFT VENTRICULAR POSTERIOR WALL IN END DIASTOLE: 0.6 CM
LEFT VENTRICULAR STROKE VOLUME: 94 ML
MV E'TISSUE VEL-LAT: 7 CM/S
MV E'TISSUE VEL-SEP: 6 CM/S
MV PEAK A VEL: 0.69 M/S
MV PEAK E VEL: 57 CM/S
PA SYSTOLIC PRESSURE: 26 MMHG
RIGHT ATRIAL 2D VOLUME: 53 ML
RIGHT ATRIUM AREA SYSTOLE A4C: 18.3 CM2
RIGHT VENTRICLE ID DIMENSION: 4.2 CM
SL CV PED ECHO LEFT VENTRICLE DIASTOLIC VOLUME (MOD BIPLANE) 2D: 109 ML
SL CV PED ECHO LEFT VENTRICLE SYSTOLIC VOLUME (MOD BIPLANE) 2D: 15 ML
TRICUSPID VALVE PEAK REGURGITATION VELOCITY: 2.41 M/S
TRICUSPID VALVE S': 50 CM/S
TV PEAK GRADIENT: 23 MMHG
Z-SCORE OF LEFT VENTRICULAR DIMENSION IN END SYSTOLE: -3.2

## 2021-11-18 PROCEDURE — 93306 TTE W/DOPPLER COMPLETE: CPT | Performed by: INTERNAL MEDICINE

## 2021-11-18 PROCEDURE — 93306 TTE W/DOPPLER COMPLETE: CPT

## 2021-11-18 RX ADMIN — PERFLUTREN 0.2 ML/MIN: 6.52 INJECTION, SUSPENSION INTRAVENOUS at 12:24

## 2021-11-24 ENCOUNTER — OFFICE VISIT (OUTPATIENT)
Dept: PULMONOLOGY | Facility: CLINIC | Age: 66
End: 2021-11-24
Payer: COMMERCIAL

## 2021-11-24 VITALS
BODY MASS INDEX: 39.2 KG/M2 | HEART RATE: 59 BPM | SYSTOLIC BLOOD PRESSURE: 110 MMHG | HEIGHT: 62 IN | WEIGHT: 213 LBS | TEMPERATURE: 96.1 F | OXYGEN SATURATION: 96 % | DIASTOLIC BLOOD PRESSURE: 76 MMHG

## 2021-11-24 DIAGNOSIS — I28.8 ENLARGED PULMONARY ARTERY (HCC): ICD-10-CM

## 2021-11-24 DIAGNOSIS — J84.9 INTERSTITIAL LUNG DISEASE (HCC): Primary | ICD-10-CM

## 2021-11-24 DIAGNOSIS — J30.2 SEASONAL ALLERGIC RHINITIS, UNSPECIFIED TRIGGER: ICD-10-CM

## 2021-11-24 DIAGNOSIS — J45.909 REACTIVE AIRWAY DISEASE WITHOUT COMPLICATION, UNSPECIFIED ASTHMA SEVERITY, UNSPECIFIED WHETHER PERSISTENT: ICD-10-CM

## 2021-11-24 PROCEDURE — 94618 PULMONARY STRESS TESTING: CPT | Performed by: INTERNAL MEDICINE

## 2021-11-24 PROCEDURE — 99215 OFFICE O/P EST HI 40 MIN: CPT | Performed by: INTERNAL MEDICINE

## 2021-12-02 ENCOUNTER — OFFICE VISIT (OUTPATIENT)
Dept: FAMILY MEDICINE CLINIC | Facility: CLINIC | Age: 66
End: 2021-12-02
Payer: COMMERCIAL

## 2021-12-02 VITALS
HEIGHT: 62 IN | RESPIRATION RATE: 16 BRPM | TEMPERATURE: 97.9 F | DIASTOLIC BLOOD PRESSURE: 70 MMHG | WEIGHT: 219 LBS | OXYGEN SATURATION: 95 % | SYSTOLIC BLOOD PRESSURE: 120 MMHG | HEART RATE: 72 BPM | BODY MASS INDEX: 40.3 KG/M2

## 2021-12-02 DIAGNOSIS — I10 HYPERTENSION, UNSPECIFIED TYPE: ICD-10-CM

## 2021-12-02 DIAGNOSIS — E66.01 MORBID OBESITY WITH BMI OF 40.0-44.9, ADULT (HCC): ICD-10-CM

## 2021-12-02 DIAGNOSIS — E04.1 THYROID NODULE: ICD-10-CM

## 2021-12-02 DIAGNOSIS — Z78.0 MENOPAUSE: ICD-10-CM

## 2021-12-02 DIAGNOSIS — Z00.01 ENCOUNTER FOR GENERAL ADULT MEDICAL EXAMINATION WITH ABNORMAL FINDINGS: Primary | ICD-10-CM

## 2021-12-02 DIAGNOSIS — J41.0 SIMPLE CHRONIC BRONCHITIS (HCC): ICD-10-CM

## 2021-12-02 DIAGNOSIS — K29.60 OTHER GASTRITIS WITHOUT HEMORRHAGE, UNSPECIFIED CHRONICITY: ICD-10-CM

## 2021-12-02 DIAGNOSIS — E03.9 HYPOTHYROIDISM (ACQUIRED): ICD-10-CM

## 2021-12-02 DIAGNOSIS — I28.8 ENLARGED PULMONARY ARTERY (HCC): ICD-10-CM

## 2021-12-02 DIAGNOSIS — Q60.0 KIDNEY CONGENITALLY ABSENT, LEFT: ICD-10-CM

## 2021-12-02 PROCEDURE — 99387 INIT PM E/M NEW PAT 65+ YRS: CPT | Performed by: FAMILY MEDICINE

## 2021-12-02 RX ORDER — LEVOTHYROXINE SODIUM 0.05 MG/1
50 TABLET ORAL DAILY
Qty: 90 TABLET | Refills: 0 | Status: SHIPPED | OUTPATIENT
Start: 2021-12-02 | End: 2022-04-08 | Stop reason: SDUPTHER

## 2021-12-02 RX ORDER — B-COMPLEX WITH VITAMIN C
1 TABLET ORAL
Qty: 60 TABLET | Refills: 5 | Status: SHIPPED | OUTPATIENT
Start: 2021-12-02

## 2021-12-02 RX ORDER — HYDROCHLOROTHIAZIDE 25 MG/1
25 TABLET ORAL EVERY MORNING
Qty: 90 TABLET | Refills: 3 | Status: SHIPPED | OUTPATIENT
Start: 2021-12-02

## 2021-12-02 RX ORDER — PANTOPRAZOLE SODIUM 40 MG/1
40 TABLET, DELAYED RELEASE ORAL DAILY
Qty: 30 TABLET | Refills: 5 | Status: SHIPPED | OUTPATIENT
Start: 2021-12-02

## 2021-12-03 ENCOUNTER — HOSPITAL ENCOUNTER (OUTPATIENT)
Dept: ULTRASOUND IMAGING | Facility: HOSPITAL | Age: 66
Discharge: HOME | End: 2021-12-03
Payer: COMMERCIAL

## 2021-12-03 DIAGNOSIS — E04.1 THYROID NODULE: ICD-10-CM

## 2021-12-03 PROCEDURE — 76536 US EXAM OF HEAD AND NECK: CPT

## 2021-12-27 ENCOUNTER — HOSPITAL ENCOUNTER (EMERGENCY)
Facility: HOSPITAL | Age: 66
Discharge: HOME | End: 2021-12-27
Attending: EMERGENCY MEDICINE
Payer: COMMERCIAL

## 2021-12-27 ENCOUNTER — APPOINTMENT (EMERGENCY)
Dept: RADIOLOGY | Facility: HOSPITAL | Age: 66
End: 2021-12-27
Payer: COMMERCIAL

## 2021-12-27 VITALS
TEMPERATURE: 97.9 F | BODY MASS INDEX: 40 KG/M2 | OXYGEN SATURATION: 95 % | HEART RATE: 81 BPM | WEIGHT: 218.7 LBS | SYSTOLIC BLOOD PRESSURE: 159 MMHG | RESPIRATION RATE: 20 BRPM | DIASTOLIC BLOOD PRESSURE: 92 MMHG

## 2021-12-27 DIAGNOSIS — Z20.822 PERSON UNDER INVESTIGATION FOR COVID-19: Primary | ICD-10-CM

## 2021-12-27 DIAGNOSIS — M79.10 MUSCLE SORENESS: ICD-10-CM

## 2021-12-27 DIAGNOSIS — R23.8 SKIN IRRITATION: ICD-10-CM

## 2021-12-27 LAB
ATRIAL RATE: 59 BPM
BILIRUB UR QL STRIP: NEGATIVE
CLARITY UR: CLEAR
COLOR UR: NORMAL
FLUAV RNA RESP QL NAA+PROBE: NEGATIVE
FLUBV RNA RESP QL NAA+PROBE: NEGATIVE
GLUCOSE UR STRIP-MCNC: NEGATIVE MG/DL
HGB UR QL STRIP.AUTO: NEGATIVE
KETONES UR STRIP-MCNC: NEGATIVE MG/DL
LEUKOCYTE ESTERASE UR QL STRIP: NEGATIVE
NITRITE UR QL STRIP: NEGATIVE
P AXIS: 59 DEGREES
PH UR STRIP.AUTO: 6 [PH]
PR INTERVAL: 190 MS
PROT UR STRIP-MCNC: NEGATIVE MG/DL
QRS AXIS: 18 DEGREES
QRSD INTERVAL: 86 MS
QT INTERVAL: 414 MS
QTC INTERVAL: 409 MS
RSV RNA RESP QL NAA+PROBE: NEGATIVE
SARS-COV-2 RNA RESP QL NAA+PROBE: NEGATIVE
SP GR UR STRIP.AUTO: 1.01 (ref 1–1.04)
T WAVE AXIS: 48 DEGREES
UROBILINOGEN UA: NEGATIVE MG/DL
VENTRICULAR RATE: 59 BPM

## 2021-12-27 PROCEDURE — 99284 EMERGENCY DEPT VISIT MOD MDM: CPT | Performed by: EMERGENCY MEDICINE

## 2021-12-27 PROCEDURE — 71045 X-RAY EXAM CHEST 1 VIEW: CPT

## 2021-12-27 PROCEDURE — 99285 EMERGENCY DEPT VISIT HI MDM: CPT

## 2021-12-27 PROCEDURE — 93005 ELECTROCARDIOGRAM TRACING: CPT

## 2021-12-27 PROCEDURE — 93010 ELECTROCARDIOGRAM REPORT: CPT | Performed by: INTERNAL MEDICINE

## 2021-12-27 PROCEDURE — 0241U HB NFCT DS VIR RESP RNA 4 TRGT: CPT | Performed by: EMERGENCY MEDICINE

## 2021-12-27 RX ADMIN — DEXAMETHASONE SODIUM PHOSPHATE 10 MG: 10 INJECTION, SOLUTION INTRAMUSCULAR; INTRAVENOUS at 14:13

## 2021-12-27 NOTE — ED PROVIDER NOTES
History  Chief Complaint   Patient presents with    Shortness of Breath     60-year-old female presents for concerns of chest irritation  Has a history of asthma controlled with rescue inhaler and Symbicort  Describes discomfort as a warmth on the anterior part of her chest   Made worse with movement of her arms  No nausea diaphoresis, no shortness of breath  Describes discomfort to coworkers and was sent to the ER for COVID testing  She is otherwise without complaint  Prior to Admission Medications   Prescriptions Last Dose Informant Patient Reported? Taking? Diclofenac Sodium (VOLTAREN) 1 %  Self Yes No   Patient not taking: Reported on 2021    Lancets (ONETOUCH ULTRASOFT) lancets  Self No No   Sig: Check blood glucose 3 times a day   Multiple Vitamins-Minerals (MULTIVITAMIN ADULT PO)  Self Yes No   Si tablet every 24 hours   Ventolin  (90 Base) MCG/ACT inhaler  Self No No   Sig: Inhale 2 puffs every 6 (six) hours as needed for wheezing   albuterol (2 5 mg/3 mL) 0 083 % nebulizer solution  Self No No   Sig: Take 1 vial (2 5 mg total) by nebulization every 6 (six) hours as needed for wheezing or shortness of breath   calcium carbonate-vitamin D (OSCAL-D) 500 mg-200 units per tablet   No No   Sig: Take 1 tablet by mouth daily with breakfast   cholecalciferol (VITAMIN D3) 1,000 units tablet  Self No No   Sig: Take 1 tablet (1,000 Units total) by mouth daily   fluticasone (FLONASE) 50 mcg/act nasal spray  Self No No   Si sprays into each nostril daily   Patient taking differently: 2 sprays into each nostril daily as needed    fluticasone-salmeterol (Wixela Inhub) 100-50 mcg/dose inhaler  Self No No   Sig: Inhale 1 puff 2 (two) times a day Rinse mouth after use  glucose blood test strip  Self No No   Sig: Checks blood glucose 3 times a day     hydrochlorothiazide (HYDRODIURIL) 25 mg tablet   No No   Sig: Take 1 tablet (25 mg total) by mouth every morning   levothyroxine 50 mcg tablet   No No   Sig: Take 1 tablet (50 mcg total) by mouth daily   olmesartan (Benicar) 40 mg tablet  Self No No   Sig: Take 1 tablet (40 mg total) by mouth daily   pantoprazole (PROTONIX) 40 mg tablet   No No   Sig: Take 1 tablet (40 mg total) by mouth daily   rosuvastatin (CRESTOR) 5 mg tablet  Self No No   Sig: Take 1 tablet (5 mg total) by mouth daily      Facility-Administered Medications: None       Past Medical History:   Diagnosis Date    Asthma     Colon polyp     Disease of thyroid gland     GERD (gastroesophageal reflux disease)     Hyperlipidemia     Hypertension     Obesity     Seasonal allergies     Thyroid disease     Tinnitus        Past Surgical History:   Procedure Laterality Date     SECTION      Triplets    COLONOSCOPY      COLONOSCOPY W/ POLYPECTOMY  2020    HYSTERECTOMY      age 51    REDUCTION MAMMAPLASTY  2009       Family History   Problem Relation Age of Onset    Coronary artery disease Maternal Grandfather     Breast cancer Maternal Aunt 62    Stroke Mother     No Known Problems Father     No Known Problems Sister     No Known Problems Daughter     No Known Problems Maternal Grandmother     No Known Problems Paternal Grandmother     No Known Problems Paternal Grandfather     No Known Problems Daughter     No Known Problems Daughter     No Known Problems Sister     No Known Problems Sister     No Known Problems Sister     No Known Problems Sister     No Known Problems Sister     No Known Problems Sister     No Known Problems Maternal Aunt     No Known Problems Maternal Aunt     No Known Problems Maternal Aunt     No Known Problems Maternal Aunt     No Known Problems Maternal Aunt     No Known Problems Paternal Aunt     No Known Problems Paternal Aunt      I have reviewed and agree with the history as documented      E-Cigarette/Vaping    E-Cigarette Use Never User      E-Cigarette/Vaping Substances    Nicotine No     THC No     CBD No  Flavoring No     Other No     Unknown No      Social History     Tobacco Use    Smoking status: Former Smoker     Packs/day: 0 50     Years: 38 00     Pack years: 19 00     Types: Cigarettes     Start date: 0     Quit date: 3/18/2010     Years since quittin 7    Smokeless tobacco: Never Used   Vaping Use    Vaping Use: Never used   Substance Use Topics    Alcohol use: Yes     Alcohol/week: 4 0 standard drinks     Types: 4 Glasses of wine per week     Comment: social    Drug use: No       Review of Systems   Constitutional: Negative  Negative for chills and fever  HENT: Negative  Negative for rhinorrhea, sore throat, trouble swallowing and voice change  Eyes: Negative  Negative for pain and visual disturbance  Respiratory: Negative  Negative for cough, shortness of breath and wheezing  Cardiovascular: Negative  Negative for chest pain and palpitations  Gastrointestinal: Negative for abdominal pain, diarrhea, nausea and vomiting  Genitourinary: Negative  Negative for dysuria and frequency  Musculoskeletal: Negative  Negative for neck pain and neck stiffness  Skin: Negative  Negative for rash  Neurological: Negative  Negative for dizziness, speech difficulty, weakness, light-headedness and numbness  Physical Exam  Physical Exam  Vitals and nursing note reviewed  Constitutional:       General: She is not in acute distress  Appearance: She is well-developed  HENT:      Head: Normocephalic and atraumatic  Eyes:      Conjunctiva/sclera: Conjunctivae normal       Pupils: Pupils are equal, round, and reactive to light  Neck:      Trachea: No tracheal deviation  Cardiovascular:      Rate and Rhythm: Normal rate and regular rhythm  Pulmonary:      Effort: Pulmonary effort is normal  No respiratory distress  Breath sounds: Normal breath sounds  No wheezing or rales  Abdominal:      General: Bowel sounds are normal  There is no distension  Palpations: Abdomen is soft  Tenderness: There is no abdominal tenderness  There is no guarding or rebound  Musculoskeletal:         General: No tenderness or deformity  Normal range of motion  Cervical back: Normal range of motion and neck supple  Skin:     General: Skin is warm and dry  Capillary Refill: Capillary refill takes less than 2 seconds  Findings: No rash  Neurological:      Mental Status: She is alert and oriented to person, place, and time  Psychiatric:         Behavior: Behavior normal          Vital Signs  ED Triage Vitals [12/27/21 1352]   Temperature Pulse Respirations Blood Pressure SpO2   97 9 °F (36 6 °C) 81 20 159/92 95 %      Temp Source Heart Rate Source Patient Position - Orthostatic VS BP Location FiO2 (%)   Tympanic Monitor Sitting Left arm --      Pain Score       2           Vitals:    12/27/21 1352   BP: 159/92   Pulse: 81   Patient Position - Orthostatic VS: Sitting         Visual Acuity      ED Medications  Medications   dexamethasone oral liquid 10 mg 1 mL (10 mg Oral Given 12/27/21 1413)       Diagnostic Studies  Results Reviewed     Procedure Component Value Units Date/Time    COVID/FLU/RSV - 2 hour TAT [[id number]]  (Normal) Collected: 12/27/21 1413    Lab Status: Final result Specimen: Nares from Nose Updated: 12/27/21 1518     SARS-CoV-2 Negative     INFLUENZA A PCR Negative     INFLUENZA B PCR Negative     RSV PCR Negative    Narrative:      FOR PEDIATRIC PATIENTS - copy/paste COVID Guidelines URL to browser: [email or web address]     This test has been authorized by FDA under an EUA (Emergency Use Assay) for use by authorized laboratories  Clinical caution and judgement should be used with the interpretation of these results with consideration of the clinical impression and other laboratory testing    Testing reported as "Positive" or "Negative" has been proven to be accurate according to standard laboratory validation requirements  All testing is performed with control materials showing appropriate reactivity at standard intervals  UA (URINE) with reflex to Scope [[id number]]  (Normal) Collected: 12/27/21 1458    Lab Status: Final result Specimen: Urine, Clean Catch Updated: 12/27/21 1518     Color, UA Straw     Clarity, UA Clear     Specific Gravity, UA 1 015     pH, UA 6 0     Leukocytes, UA Negative     Nitrite, UA Negative     Protein, UA Negative mg/dl      Glucose, UA Negative mg/dl      Ketones, UA Negative mg/dl      Bilirubin, UA Negative     Blood, UA Negative     UROBILINOGEN UA Negative mg/dL                  XR chest 1 view portable   ED Interpretation by Vincent Matt DO (12/27 1446)   No acute pna or ptx appreciated  Final Result by Rosalee Oliva MD (12/27 1229)   No acute cardiopulmonary disease  Findings are stable            Workstation performed: [id number]                    Procedures  Procedures         ED Course  ED Course as of 12/27/21 1550   Mon Dec 27, 2021   1414 Procedure Note: EKG  Date/Time: 12/27/21 2:14 PM   Performed by: Evelyn Martinez  Authorized by: Evelyn Martinez  ECG interpreted by me, the ED Provider: yes   The EKG demonstrates:  Rate 59  Rhythm sinus nicolas  QTc 409  No ST elevations/depressions                                   SBIRT 20yo+      Most Recent Value   SBIRT (25 yo +)    In order to provide better care to our patients, we are screening all of our patients for alcohol and drug use  Would it be okay to ask you these screening questions? Yes Filed at: 12/27/2021 1403   Initial Alcohol Screen: US AUDIT-C     1  How often do you have a drink containing alcohol? 0 Filed at: 12/27/2021 1403   2  How many drinks containing alcohol do you have on a typical day you are drinking? 0 Filed at: 12/27/2021 1403   3b  FEMALE Any Age, or MALE 65+: How often do you have 4 or more drinks on one occassion?  0 Filed at: 12/27/2021 1403   Audit-C Score 0 Filed at: 12/27/2021 140   BECKA: How many times in the past year have you    Used an illegal drug or used a prescription medication for non-medical reasons? Never Filed at: 12/27/2021 1403                    [facility]  Number of Diagnoses or Management Options  Muscle soreness  Person under investigation for COVID-19  Skin irritation  Diagnosis management comments: I have reviewed any of the patient's vist and any testing done in the emergency department  They have verbalized their understanding of any testing done today and have no further questions or concerns regarding their care in the emergency room  They will follow up with their primary care physician as well as with any specialist in their discharge instructions  Strict return precautions were discussed  Amount and/or Complexity of Data Reviewed  Clinical lab tests: ordered and reviewed  Tests in the radiology section of CPT®: ordered and reviewed  Independent visualization of images, tracings, or specimens: yes        Disposition  Final diagnoses:   Person under investigation for COVID-19   Skin irritation   Muscle soreness     Time reflects when diagnosis was documented in both MDM as applicable and the Disposition within this note     Time User Action Codes Description Comment    12/27/2021  2:50 PM Mahnaz Reynoso [Z20 822] Person under investigation for COVID-19     12/27/2021  2:51 PM Ryne Stein Add [R23 8] Skin irritation     12/27/2021  2:51 PM To Pepe [M79 10] Muscle soreness       ED Disposition     ED Disposition Condition Date/Time Comment    Discharge Stable Mon Dec 27, 2021  2:50 PM Wanda Schaffer discharge to home/self care              Follow-up Information     Follow up With Specialties Details Why Contact Info    Natasha Thomas MD [address]  [address] [place] [id number]  [phone]            Discharge Medication List as of 12/27/2021  2:51 PM      CONTINUE these medications which have NOT CHANGED    Details   albuterol (2 5 mg/3 mL) 0 083 % nebulizer solution Take 1 vial (2 5 mg total) by nebulization every 6 (six) hours as needed for wheezing or shortness of breath, Starting Thu 3/12/2020, Normal      calcium carbonate-vitamin D (OSCAL-D) 500 mg-200 units per tablet Take 1 tablet by mouth daily with breakfast, Starting Thu 12/2/2021, Normal      cholecalciferol (VITAMIN D3) 1,000 units tablet Take 1 tablet (1,000 Units total) by mouth daily, Starting Tue 5/11/2021, Normal      Diclofenac Sodium (VOLTAREN) 1 % Starting Tue 12/22/2020, Historical Med      fluticasone (FLONASE) 50 mcg/act nasal spray 2 sprays into each nostril daily, Starting Mon 5/20/2019, Normal      fluticasone-salmeterol (Wixela Inhub) 100-50 mcg/dose inhaler Inhale 1 puff 2 (two) times a day Rinse mouth after use , Starting Wed 10/27/2021, Until Fri 11/26/2021, Normal      glucose blood test strip Checks blood glucose 3 times a day , Normal      hydrochlorothiazide (HYDRODIURIL) 25 mg tablet Take 1 tablet (25 mg total) by mouth every morning, Starting Thu 12/2/2021, Normal      Lancets (ONETOUCH ULTRASOFT) lancets Check blood glucose 3 times a day, Normal      levothyroxine 50 mcg tablet Take 1 tablet (50 mcg total) by mouth daily, Starting Thu 12/2/2021, Normal      Multiple Vitamins-Minerals (MULTIVITAMIN ADULT PO) 1 tablet every 24 hours, Starting Mon 10/27/2014, Historical Med      olmesartan (Benicar) 40 mg tablet Take 1 tablet (40 mg total) by mouth daily, Starting Thu 6/10/2021, Until Wed 11/24/2021, Normal      pantoprazole (PROTONIX) 40 mg tablet Take 1 tablet (40 mg total) by mouth daily, Starting Thu 12/2/2021, Normal      rosuvastatin (CRESTOR) 5 mg tablet Take 1 tablet (5 mg total) by mouth daily, Starting Thu 6/10/2021, Normal      Ventolin  (90 Base) MCG/ACT inhaler Inhale 2 puffs every 6 (six) hours as needed for wheezing, Starting Wed 1/13/2021, Normal             No discharge procedures on file     PDMP Review     None          ED Provider  Electronically Signed by           Pawan Bravo DO  12/27/21 5608

## 2021-12-27 NOTE — ED TRIAGE NOTES
Reports pain in the chest when she touches her chest  Reports her O2 upstairs was 92 - reports she has asthma  They also want her tested her covid  Denies fevers  Reports a little cough

## 2021-12-28 DIAGNOSIS — Z87.898 HISTORY OF PREDIABETES: ICD-10-CM

## 2021-12-28 DIAGNOSIS — Z12.31 ENCOUNTER FOR SCREENING MAMMOGRAM FOR MALIGNANT NEOPLASM OF BREAST: Primary | ICD-10-CM

## 2021-12-28 DIAGNOSIS — J30.1 SEASONAL ALLERGIC RHINITIS DUE TO POLLEN: ICD-10-CM

## 2021-12-28 RX ORDER — BENZONATATE 200 MG/1
200 CAPSULE ORAL 3 TIMES DAILY PRN
Qty: 20 CAPSULE | Refills: 0 | Status: SHIPPED | OUTPATIENT
Start: 2021-12-28 | End: 2022-06-09 | Stop reason: ALTCHOICE

## 2021-12-28 RX ORDER — LANCETS
EACH MISCELLANEOUS
Qty: 100 EACH | Refills: 3 | Status: SHIPPED | OUTPATIENT
Start: 2021-12-28

## 2021-12-28 RX ORDER — FLUTICASONE PROPIONATE 50 MCG
2 SPRAY, SUSPENSION (ML) NASAL DAILY PRN
Qty: 16 G | Refills: 1 | Status: SHIPPED | OUTPATIENT
Start: 2021-12-28

## 2021-12-29 DIAGNOSIS — J45.909 MILD ASTHMA WITHOUT COMPLICATION, UNSPECIFIED WHETHER PERSISTENT: ICD-10-CM

## 2021-12-29 RX ORDER — ALBUTEROL SULFATE 2.5 MG/3ML
SOLUTION RESPIRATORY (INHALATION)
Qty: 300 ML | Refills: 0 | Status: SHIPPED | OUTPATIENT
Start: 2021-12-29

## 2022-01-01 ENCOUNTER — HOSPITAL ENCOUNTER (EMERGENCY)
Facility: HOSPITAL | Age: 67
Discharge: HOME | End: 2022-01-01
Attending: EMERGENCY MEDICINE | Admitting: EMERGENCY MEDICINE
Payer: COMMERCIAL

## 2022-01-01 VITALS
SYSTOLIC BLOOD PRESSURE: 160 MMHG | BODY MASS INDEX: 39.87 KG/M2 | TEMPERATURE: 98.3 F | RESPIRATION RATE: 16 BRPM | OXYGEN SATURATION: 95 % | DIASTOLIC BLOOD PRESSURE: 85 MMHG | HEART RATE: 65 BPM | WEIGHT: 218 LBS

## 2022-01-01 DIAGNOSIS — L03.019 PARONYCHIA OF FINGER: Primary | ICD-10-CM

## 2022-01-01 PROCEDURE — 99284 EMERGENCY DEPT VISIT MOD MDM: CPT | Performed by: EMERGENCY MEDICINE

## 2022-01-01 PROCEDURE — 99283 EMERGENCY DEPT VISIT LOW MDM: CPT

## 2022-01-01 RX ORDER — CEPHALEXIN 500 MG/1
500 CAPSULE ORAL 4 TIMES DAILY
Qty: 28 CAPSULE | Refills: 0 | Status: SHIPPED | OUTPATIENT
Start: 2022-01-01 | End: 2022-01-08

## 2022-01-01 NOTE — ED PROVIDER NOTES
History  Chief Complaint   Patient presents with    Finger Swelling     Finger swelling left hand 2nd finger  78-year-old female presents with complaint of worsening left second digit pain  She is having redness swelling along the medial aspect of the fingernail  She denies any numbness or weakness in the affected digit  She has had no fevers or other constitutional symptoms  Hand Pain  Location:  Left second digit - medial aspect  Quality:  Itching, aching  Severity:  Mild  Onset quality:  Gradual  Timing:  Constant  Progression:  Worsening  Chronicity:  New  Relieved by:  Nothing  Worsened by:  Nothing  Ineffective treatments:  None tried  Associated symptoms: no fever        Prior to Admission Medications   Prescriptions Last Dose Informant Patient Reported? Taking?    Calcium Carb-Cholecalciferol (Oyster Shell Calcium w/D) 500-200 MG-UNIT TABS No   Yes No   Diclofenac Sodium (VOLTAREN) 1 %  Self Yes No   Patient not taking: Reported on 2021    Lancets (onetouch ultrasoft) lancets   No No   Sig: Check blood glucose 3 times a day   Multiple Vitamins-Minerals (MULTIVITAMIN ADULT PO)  Self Yes No   Si tablet every 24 hours   Ventolin  (90 Base) MCG/ACT inhaler  Self No No   Sig: Inhale 2 puffs every 6 (six) hours as needed for wheezing   albuterol (2 5 mg/3 mL) 0 083 % nebulizer solution   No No   Sig: USE 1 AMPULE VIA NEBULIZER EVERY 6 HOURS AS NEEDED FOR WHEEZING OR SHORTNESS OF BREATH   benzonatate (TESSALON) 200 MG capsule   No No   Sig: Take 1 capsule (200 mg total) by mouth 3 (three) times a day as needed for cough   calcium carbonate-vitamin D (OSCAL-D) 500 mg-200 units per tablet   No No   Sig: Take 1 tablet by mouth daily with breakfast   cholecalciferol (VITAMIN D3) 1,000 units tablet  Self No No   Sig: Take 1 tablet (1,000 Units total) by mouth daily   fluticasone (FLONASE) 50 mcg/act nasal spray   No No   Si sprays into each nostril daily as needed for rhinitis fluticasone-salmeterol (Wixela Inhub) 100-50 mcg/dose inhaler  Self No No   Sig: Inhale 1 puff 2 (two) times a day Rinse mouth after use  glucose blood test strip   No No   Sig: Checks blood glucose 3 times a day     hydrochlorothiazide (HYDRODIURIL) 25 mg tablet   No No   Sig: Take 1 tablet (25 mg total) by mouth every morning   levothyroxine 50 mcg tablet   No No   Sig: Take 1 tablet (50 mcg total) by mouth daily   olmesartan (Benicar) 40 mg tablet  Self No No   Sig: Take 1 tablet (40 mg total) by mouth daily   pantoprazole (PROTONIX) 40 mg tablet   No No   Sig: Take 1 tablet (40 mg total) by mouth daily   rosuvastatin (CRESTOR) 5 mg tablet  Self No No   Sig: Take 1 tablet (5 mg total) by mouth daily      Facility-Administered Medications: None       Past Medical History:   Diagnosis Date    Asthma     Colon polyp     Disease of thyroid gland     GERD (gastroesophageal reflux disease)     Hyperlipidemia     Hypertension     Obesity     Seasonal allergies     Thyroid disease     Tinnitus        Past Surgical History:   Procedure Laterality Date     SECTION      Triplets    COLONOSCOPY      COLONOSCOPY W/ POLYPECTOMY  2020    HYSTERECTOMY      age 51    REDUCTION MAMMAPLASTY  2009       Family History   Problem Relation Age of Onset    Coronary artery disease Maternal Grandfather     Breast cancer Maternal Aunt 62    Stroke Mother     No Known Problems Father     No Known Problems Sister     No Known Problems Daughter     No Known Problems Maternal Grandmother     No Known Problems Paternal Grandmother     No Known Problems Paternal Grandfather     No Known Problems Daughter     No Known Problems Daughter     No Known Problems Sister     No Known Problems Sister     No Known Problems Sister     No Known Problems Sister     No Known Problems Sister     No Known Problems Sister     No Known Problems Maternal Aunt     No Known Problems Maternal Aunt     No Known Problems Maternal Aunt     No Known Problems Maternal Aunt     No Known Problems Maternal Aunt     No Known Problems Paternal Aunt     No Known Problems Paternal Aunt      I have reviewed and agree with the history as documented  E-Cigarette/Vaping    E-Cigarette Use Never User      E-Cigarette/Vaping Substances    Nicotine No     THC No     CBD No     Flavoring No     Other No     Unknown No      Social History     Tobacco Use    Smoking status: Former Smoker     Packs/day: 0 50     Years: 38 00     Pack years: 19 00     Types: Cigarettes     Start date: 0     Quit date: 3/18/2010     Years since quittin 8    Smokeless tobacco: Never Used   Vaping Use    Vaping Use: Never used   Substance Use Topics    Alcohol use: Yes     Alcohol/week: 4 0 standard drinks     Types: 4 Glasses of wine per week     Comment: social    Drug use: No       Review of Systems   Constitutional: Negative for fever  All other systems reviewed and are negative  Physical Exam  Physical Exam  Vitals and nursing note reviewed  Constitutional:       General: She is not in acute distress  Appearance: Normal appearance  She is well-developed  She is not ill-appearing or toxic-appearing  HENT:      Head: Normocephalic  Right Ear: External ear normal       Left Ear: External ear normal       Nose: Nose normal    Eyes:      General: No scleral icterus  Pulmonary:      Effort: Pulmonary effort is normal  No respiratory distress  Musculoskeletal:        Arms:    Skin:     General: Skin is warm and dry  Capillary Refill: Capillary refill takes less than 2 seconds  Neurological:      General: No focal deficit present  Mental Status: She is alert and oriented to person, place, and time  Sensory: No sensory deficit  Motor: No weakness     Psychiatric:         Mood and Affect: Mood normal          Behavior: Behavior normal          Vital Signs  ED Triage Vitals   Temp Pulse Resp BP SpO2   -- -- -- -- --      Temp src Heart Rate Source Patient Position - Orthostatic VS BP Location FiO2 (%)   -- -- -- -- --      Pain Score       --           There were no vitals filed for this visit  Visual Acuity      ED Medications  Medications - No data to display    Diagnostic Studies  Results Reviewed     None                 No orders to display              Procedures  Procedures         ED Course                                             MDM  Number of Diagnoses or Management Options  Paronychia of finger  Diagnosis management comments: 71-year-old female presents with paronychia to her left second digit  It is not amenable to incision and drainage at this point time  Will place on antibiotics  She is aware of reasons return to the ER  Disposition  Final diagnoses:   Paronychia of finger     Time reflects when diagnosis was documented in both MDM as applicable and the Disposition within this note     Time User Action Codes Description Comment    1/1/2022  6:16 AM Yudith Pepe [L90 160] Paronychia of finger       ED Disposition     ED Disposition Condition Date/Time Comment    Discharge Stable Sat Jan 1, 2022  6:16 AM Monica Stevens discharge to home/self care  Follow-up Information     Follow up With Specialties Details Why Grayson Ruano MD [facility]   [address] [place] [address] [address]  [address] [place] [address] [address]  [facility]  [address]  [phone]  [phone]            Patient's Medications   Discharge Prescriptions    CEPHALEXIN (KEFLEX) 500 MG CAPSULE    Take 1 capsule (500 mg total) by mouth 4 (four) times a day for 7 days       Start Date: 1/1/2022  End Date: 1/8/2022       Order Dose: 500 mg       Quantity: 28 capsule    Refills: 0       No discharge procedures on file      PDMP Review     None          ED Provider  Electronically Signed by           Kasey Barrera DO  01/01/22 1065

## 2022-01-04 ENCOUNTER — APPOINTMENT (OUTPATIENT)
Dept: LAB | Facility: HOSPITAL | Age: 67
End: 2022-01-04
Payer: COMMERCIAL

## 2022-01-04 DIAGNOSIS — Z20.822 OCCUPATIONAL EXPOSURE TO COVID-19 VIRUS: ICD-10-CM

## 2022-01-04 PROCEDURE — U0005 INFEC AGEN DETEC AMPLI PROBE: HCPCS

## 2022-01-04 PROCEDURE — U0003 INFECTIOUS AGENT DETECTION BY NUCLEIC ACID (DNA OR RNA); SEVERE ACUTE RESPIRATORY SYNDROME CORONAVIRUS 2 (SARS-COV-2) (CORONAVIRUS DISEASE [COVID-19]), AMPLIFIED PROBE TECHNIQUE, MAKING USE OF HIGH THROUGHPUT TECHNOLOGIES AS DESCRIBED BY CMS-2020-01-R: HCPCS

## 2022-01-05 LAB — SARS-COV-2 RNA RESP QL NAA+PROBE: NEGATIVE

## 2022-01-17 ENCOUNTER — OFFICE VISIT (OUTPATIENT)
Dept: DENTISTRY | Facility: CLINIC | Age: 67
End: 2022-01-17

## 2022-01-17 VITALS — DIASTOLIC BLOOD PRESSURE: 67 MMHG | HEART RATE: 64 BPM | TEMPERATURE: 97.7 F | SYSTOLIC BLOOD PRESSURE: 115 MMHG

## 2022-01-17 DIAGNOSIS — Z01.20 ENCOUNTER FOR DENTAL EXAMINATION: Primary | ICD-10-CM

## 2022-01-17 PROCEDURE — D0330 PANORAMIC RADIOGRAPHIC IMAGE: HCPCS | Performed by: DENTAL HYGIENIST

## 2022-01-17 PROCEDURE — D1110 PROPHYLAXIS - ADULT: HCPCS | Performed by: DENTAL HYGIENIST

## 2022-01-17 PROCEDURE — D0120 PERIODIC ORAL EVALUATION - ESTABLISHED PATIENT: HCPCS | Performed by: DENTAL HYGIENIST

## 2022-01-17 NOTE — PROGRESS NOTES
Adult Prophy     Exams:  Periodic exam   Xrays:       PAN  Type of Treatment:  Adult Prophy - Hand scaling,  Polished, Flossed  Reviewed OHI  Brush:  2X/day and Floss 1X/day  Recommended Listerine  / ACT  mouth rinses  EO/OCS Exams:  No significant findings  IO: No significant findings  Oral Hygiene:  Good   Plaque:  Light   Calculus:  Light  Bleeding:  none  Gingiva:  Pink  / Firm   Stain:  none  Perio Charting:  Periocharting was not completed  Perio Findings: Moderate bone loss, Moderate recession  Healthy gingiva  Stable perio  Caries Findings:  No decay  Tooth #8 lingual  Was rough to pt's tongue after restoration was done last time  Dr Ángel Ramsey smoothed it out with a round white bur    Caries Risk Assessment:   Moderate  caries risk    Treatment Plan:    Not updated  Dr  Exam:   Dr Ángel Ramsey  Referral:  No referral given   NV:  6  Month Recall

## 2022-02-11 ENCOUNTER — LAB (OUTPATIENT)
Dept: LAB | Facility: HOSPITAL | Age: 67
End: 2022-02-11
Payer: COMMERCIAL

## 2022-02-11 DIAGNOSIS — Z00.01 ENCOUNTER FOR GENERAL ADULT MEDICAL EXAMINATION WITH ABNORMAL FINDINGS: ICD-10-CM

## 2022-02-11 DIAGNOSIS — E03.9 HYPOTHYROIDISM (ACQUIRED): ICD-10-CM

## 2022-02-11 LAB
ALBUMIN SERPL BCP-MCNC: 3.6 G/DL (ref 3–5.2)
ALP SERPL-CCNC: 68 U/L (ref 43–122)
ALT SERPL W P-5'-P-CCNC: 17 U/L
ANION GAP SERPL CALCULATED.3IONS-SCNC: 4 MMOL/L (ref 5–14)
AST SERPL W P-5'-P-CCNC: 25 U/L (ref 14–36)
BILIRUB SERPL-MCNC: 0.74 MG/DL
BUN SERPL-MCNC: 18 MG/DL (ref 5–25)
CALCIUM SERPL-MCNC: 8.7 MG/DL (ref 8.4–10.2)
CHLORIDE SERPL-SCNC: 105 MMOL/L (ref 97–108)
CHOLEST SERPL-MCNC: 176 MG/DL
CO2 SERPL-SCNC: 30 MMOL/L (ref 22–30)
CREAT SERPL-MCNC: 0.68 MG/DL (ref 0.6–1.2)
GFR SERPL CREATININE-BSD FRML MDRD: 91 ML/MIN/1.73SQ M
GLUCOSE P FAST SERPL-MCNC: 116 MG/DL (ref 70–99)
HDLC SERPL-MCNC: 38 MG/DL
LDLC SERPL CALC-MCNC: 121 MG/DL
NONHDLC SERPL-MCNC: 138 MG/DL
POTASSIUM SERPL-SCNC: 3.7 MMOL/L (ref 3.6–5)
PROT SERPL-MCNC: 7.1 G/DL (ref 5.9–8.4)
SODIUM SERPL-SCNC: 139 MMOL/L (ref 137–147)
TRIGL SERPL-MCNC: 86 MG/DL
TSH SERPL DL<=0.05 MIU/L-ACNC: 1.92 UIU/ML (ref 0.47–4.68)

## 2022-02-11 PROCEDURE — 36415 COLL VENOUS BLD VENIPUNCTURE: CPT

## 2022-02-11 PROCEDURE — 80061 LIPID PANEL: CPT

## 2022-02-11 PROCEDURE — 84443 ASSAY THYROID STIM HORMONE: CPT

## 2022-02-11 PROCEDURE — 80053 COMPREHEN METABOLIC PANEL: CPT

## 2022-03-22 ENCOUNTER — TELEPHONE (OUTPATIENT)
Dept: PULMONOLOGY | Facility: CLINIC | Age: 67
End: 2022-03-22

## 2022-03-22 DIAGNOSIS — J84.9 INTERSTITIAL LUNG DISEASE (HCC): Primary | ICD-10-CM

## 2022-03-22 NOTE — TELEPHONE ENCOUNTER
I changed the order to PFT bronchodilator and 6 minute walk  Can you schedule this for her have done prior to her 6 month follow-up with Dr Angie Damico? Thanks

## 2022-03-22 NOTE — TELEPHONE ENCOUNTER
Called patient, she didn't understand so I got her rescheduled for the proper PFT with 6MW, the next available wasn't until 6/22  We will call her with a follow up appointment once the schedule is released for the rest of June and July at the [facility]   Thank you

## 2022-03-22 NOTE — TELEPHONE ENCOUNTER
Patient is calling to make her 6 month follow up appointment  In her last office visit it is noted for her to get a PFT done and a 6 minute walk done before the appointment  I only seen an order for a PFT with bronch, can we place an order for the 6MW with that so she can get that scheduled?  Please advise

## 2022-04-08 ENCOUNTER — HOSPITAL ENCOUNTER (OUTPATIENT)
Dept: MAMMOGRAPHY | Facility: CLINIC | Age: 67
Discharge: HOME | End: 2022-04-08
Payer: COMMERCIAL

## 2022-04-08 VITALS — WEIGHT: 218 LBS | BODY MASS INDEX: 40.12 KG/M2 | HEIGHT: 62 IN

## 2022-04-08 DIAGNOSIS — Z12.31 ENCOUNTER FOR SCREENING MAMMOGRAM FOR MALIGNANT NEOPLASM OF BREAST: ICD-10-CM

## 2022-04-08 DIAGNOSIS — E03.9 HYPOTHYROIDISM (ACQUIRED): ICD-10-CM

## 2022-04-08 DIAGNOSIS — L20.9 ATOPIC DERMATITIS, UNSPECIFIED TYPE: Primary | ICD-10-CM

## 2022-04-08 PROCEDURE — 77063 BREAST TOMOSYNTHESIS BI: CPT

## 2022-04-08 PROCEDURE — 77067 SCR MAMMO BI INCL CAD: CPT

## 2022-04-10 RX ORDER — LEVOTHYROXINE SODIUM 0.05 MG/1
50 TABLET ORAL DAILY
Qty: 90 TABLET | Refills: 3 | Status: SHIPPED | OUTPATIENT
Start: 2022-04-10

## 2022-04-28 DIAGNOSIS — K64.9 HEMORRHOIDS, UNSPECIFIED HEMORRHOID TYPE: Primary | ICD-10-CM

## 2022-05-09 ENCOUNTER — OFFICE VISIT (OUTPATIENT)
Dept: FAMILY MEDICINE CLINIC | Facility: CLINIC | Age: 67
End: 2022-05-09
Payer: COMMERCIAL

## 2022-05-09 VITALS
HEIGHT: 62 IN | BODY MASS INDEX: 41.59 KG/M2 | TEMPERATURE: 98.3 F | DIASTOLIC BLOOD PRESSURE: 80 MMHG | RESPIRATION RATE: 18 BRPM | SYSTOLIC BLOOD PRESSURE: 138 MMHG | WEIGHT: 226 LBS | OXYGEN SATURATION: 99 % | HEART RATE: 80 BPM

## 2022-05-09 DIAGNOSIS — M79.671 PAIN OF RIGHT HEEL: ICD-10-CM

## 2022-05-09 DIAGNOSIS — R05.9 COUGH: Primary | ICD-10-CM

## 2022-05-09 PROCEDURE — 99214 OFFICE O/P EST MOD 30 MIN: CPT | Performed by: NURSE PRACTITIONER

## 2022-05-09 RX ORDER — PREDNISONE 20 MG/1
20 TABLET ORAL DAILY
Qty: 5 TABLET | Refills: 0 | Status: SHIPPED | OUTPATIENT
Start: 2022-05-09 | End: 2022-05-14

## 2022-05-09 RX ORDER — AZITHROMYCIN 250 MG/1
TABLET, FILM COATED ORAL
Qty: 6 TABLET | Refills: 0 | Status: SHIPPED | OUTPATIENT
Start: 2022-05-09 | End: 2022-05-13

## 2022-05-09 RX ORDER — BENZONATATE 200 MG/1
200 CAPSULE ORAL 3 TIMES DAILY PRN
Qty: 20 CAPSULE | Refills: 0 | Status: SHIPPED | OUTPATIENT
Start: 2022-05-09 | End: 2022-06-09 | Stop reason: ALTCHOICE

## 2022-05-09 NOTE — PROGRESS NOTES
Assessment/Plan:    Cough  -pt reports having a cough and congestion for past week now  States she has not had a fever at all  She reports every year she gets sick like this with her allergies  She has been coughing and wheezing at night mainly  She has been using her albuterol inhaler  I advised to use her daily inhaled steroid and use albuterol inhaler as needed  She verbalized understanding  Today we are prescribing her an antibiotic, cough medication and a taper of oral steroid to use x 5 days  To rest and increase fluids and rtw on 5/11/22  If s/s do not improve or worsen to call office  Diagnoses and all orders for this visit:    Cough  -     predniSONE 20 mg tablet; Take 1 tablet (20 mg total) by mouth daily for 5 days  -     azithromycin (ZITHROMAX) 250 mg tablet; Take 2 tablets today then 1 tablet daily x 4 days  -     benzonatate (TESSALON) 200 MG capsule; Take 1 capsule (200 mg total) by mouth 3 (three) times a day as needed for cough    Pain of right heel  -     Ambulatory Referral to Podiatry; Future          Subjective:      Patient ID: Abdullahi Gomes is a 78 y o  female  78year old here for follow up on allergies  States this started 1 weeks ago  URI   This is a new problem  The current episode started in the past 7 days  The problem has been waxing and waning  There has been no fever  Associated symptoms include congestion, coughing, rhinorrhea, sneezing and wheezing  Pertinent negatives include no dysuria, ear pain, headaches, nausea, neck pain, plugged ear sensation, rash, sinus pain or sore throat  Associated symptoms comments: Itchy throat and ears  Treatments tried: using inhaler  The treatment provided significant relief         The following portions of the patient's history were reviewed and updated as appropriate: allergies, current medications, past family history, past medical history, past social history, past surgical history and problem list     Review of Systems   HENT: Positive for congestion, rhinorrhea and sneezing  Negative for ear pain, sinus pain and sore throat  Respiratory: Positive for cough and wheezing  Gastrointestinal: Negative for nausea  Genitourinary: Negative for dysuria  Musculoskeletal: Negative for neck pain  Skin: Negative for rash  Neurological: Negative for headaches  Objective:      /80 (BP Location: Left arm, Patient Position: Sitting, Cuff Size: Standard)   Pulse 80   Temp 98 3 °F (36 8 °C) (Tympanic)   Resp 18   Ht 5' 2" (1 575 m)   Wt 103 kg (226 lb)   SpO2 99%   BMI 41 34 kg/m²          Physical Exam  Vitals and nursing note reviewed  Constitutional:       General: She is not in acute distress  Appearance: Normal appearance  She is obese  She is not ill-appearing  HENT:      Head: Normocephalic and atraumatic  Right Ear: Hearing and external ear normal  No tenderness  A middle ear effusion is present  Left Ear: Hearing and external ear normal  No tenderness  A middle ear effusion is present  Nose: Mucosal edema and congestion present  No rhinorrhea  Mouth/Throat:      Mouth: Mucous membranes are moist       Pharynx: Oropharynx is clear  No oropharyngeal exudate or posterior oropharyngeal erythema  Tonsils: 0 on the right  0 on the left  Eyes:      Conjunctiva/sclera: Conjunctivae normal    Cardiovascular:      Rate and Rhythm: Normal rate and regular rhythm  Pulses: Normal pulses  Heart sounds: Normal heart sounds  Pulmonary:      Effort: Pulmonary effort is normal  No respiratory distress  Breath sounds: Normal breath sounds  No wheezing  Musculoskeletal:         General: Normal range of motion  Cervical back: Normal range of motion and neck supple  Skin:     General: Skin is warm and dry  Capillary Refill: Capillary refill takes less than 2 seconds  Neurological:      General: No focal deficit present        Mental Status: She is alert and oriented to person, place, and time     Psychiatric:         Mood and Affect: Mood normal          Behavior: Behavior normal              Chief Complaint   Patient presents with    Cough    Nasal Congestion

## 2022-05-09 NOTE — LETTER
May 9, 2022     Patient: James Sanchez  YOB: 1955  Date of Visit: 5/9/2022      To Whom it May Concern:    James Sanchez is under my professional care  Aaron Cintron was seen in my office on 5/9/2022  Aaron Cintron may return to work on 5/11/22  If you have any questions or concerns, please don't hesitate to call           Sincerely,          FAISAL Mccann        CC: No Recipients

## 2022-05-09 NOTE — ASSESSMENT & PLAN NOTE
-pt reports having a cough and congestion for past week now  States she has not had a fever at all  She reports every year she gets sick like this with her allergies  She has been coughing and wheezing at night mainly  She has been using her albuterol inhaler  I advised to use her daily inhaled steroid and use albuterol inhaler as needed  She verbalized understanding  Today we are prescribing her an antibiotic, cough medication and a taper of oral steroid to use x 5 days  To rest and increase fluids and rtw on 5/11/22  If s/s do not improve or worsen to call office

## 2022-05-13 ENCOUNTER — TELEPHONE (OUTPATIENT)
Dept: FAMILY MEDICINE CLINIC | Facility: CLINIC | Age: 67
End: 2022-05-13

## 2022-05-13 DIAGNOSIS — R05.9 COUGH: Primary | ICD-10-CM

## 2022-05-13 RX ORDER — AZITHROMYCIN 250 MG/1
250 TABLET, FILM COATED ORAL DAILY
Qty: 3 TABLET | Refills: 0 | Status: SHIPPED | OUTPATIENT
Start: 2022-05-13 | End: 2022-05-16

## 2022-05-13 RX ORDER — PREDNISONE 20 MG/1
20 TABLET ORAL DAILY
Qty: 3 TABLET | Refills: 0 | Status: SHIPPED | OUTPATIENT
Start: 2022-05-13 | End: 2022-05-16

## 2022-05-13 NOTE — TELEPHONE ENCOUNTER
Placed call to patient stated she would like another taper of steroids and zpack  I advised to take 3 more days worth  Will send to pharmacy if not feeling well to f/u in office

## 2022-05-13 NOTE — TELEPHONE ENCOUNTER
Patient called stating that she is still feeling sick  She is on her last prednisone and will like an antibiotic

## 2022-05-19 ENCOUNTER — TELEPHONE (OUTPATIENT)
Dept: PULMONOLOGY | Facility: CLINIC | Age: 67
End: 2022-05-19

## 2022-06-09 ENCOUNTER — OFFICE VISIT (OUTPATIENT)
Dept: FAMILY MEDICINE CLINIC | Facility: CLINIC | Age: 67
End: 2022-06-09
Payer: COMMERCIAL

## 2022-06-09 VITALS
OXYGEN SATURATION: 95 % | DIASTOLIC BLOOD PRESSURE: 70 MMHG | HEIGHT: 62 IN | BODY MASS INDEX: 41.59 KG/M2 | HEART RATE: 78 BPM | SYSTOLIC BLOOD PRESSURE: 120 MMHG | WEIGHT: 226 LBS | TEMPERATURE: 97.9 F | RESPIRATION RATE: 16 BRPM

## 2022-06-09 DIAGNOSIS — E03.9 HYPOTHYROIDISM (ACQUIRED): ICD-10-CM

## 2022-06-09 DIAGNOSIS — R53.83 FATIGUE, UNSPECIFIED TYPE: ICD-10-CM

## 2022-06-09 DIAGNOSIS — R25.2 CRAMPS OF LOWER EXTREMITY: ICD-10-CM

## 2022-06-09 DIAGNOSIS — E78.00 PURE HYPERCHOLESTEROLEMIA: ICD-10-CM

## 2022-06-09 DIAGNOSIS — Z87.898 HISTORY OF PREDIABETES: Primary | ICD-10-CM

## 2022-06-09 PROCEDURE — 99214 OFFICE O/P EST MOD 30 MIN: CPT | Performed by: FAMILY MEDICINE

## 2022-06-09 NOTE — PROGRESS NOTES
Assessment/Plan:      Diagnoses and all orders for this visit:    History of prediabetes  -     Hemoglobin A1C; Future    Pure hypercholesterolemia  -     Lipid Panel with Direct LDL reflex; Future    Fatigue, unspecified type  -     CBC and differential; Future    Hypothyroidism (acquired)  -     TSH, 3rd generation; Future    Cramps of lower extremity  -     Basic metabolic panel; Future  -     Magnesium; Future          Subjective:     Patient ID: Brian Escalante is a 78 y o  female  HPI  Patient is here for PE, not having any acute distress  She has creamps in LE's  +  Review of Systems   Constitutional: Negative for diaphoresis, fatigue, fever and unexpected weight change  Respiratory: Negative for cough, chest tightness, shortness of breath and wheezing  Cardiovascular: Negative for chest pain, palpitations and leg swelling  Gastrointestinal: Negative for abdominal pain, blood in stool and constipation  Neurological: Negative for dizziness, syncope, light-headedness and headaches  Hematological: Does not bruise/bleed easily  Psychiatric/Behavioral: Negative for behavioral problems, self-injury and sleep disturbance  The patient is not nervous/anxious  Objective:     Physical Exam  Cardiovascular:      Rate and Rhythm: Normal rate and regular rhythm  Pulmonary:      Effort: Pulmonary effort is normal       Breath sounds: Normal breath sounds  Musculoskeletal:         General: Normal range of motion  Cervical back: Neck supple  Neurological:      General: No focal deficit present  Mental Status: She is alert and oriented to person, place, and time     Psychiatric:         Behavior: Behavior normal

## 2022-06-21 RX ORDER — ALBUTEROL SULFATE 2.5 MG/3ML
2.5 SOLUTION RESPIRATORY (INHALATION) ONCE AS NEEDED
Status: COMPLETED | OUTPATIENT
Start: 2022-06-21 | End: 2022-06-22

## 2022-06-22 ENCOUNTER — HOSPITAL ENCOUNTER (OUTPATIENT)
Dept: PULMONOLOGY | Facility: HOSPITAL | Age: 67
Discharge: HOME | End: 2022-06-22
Payer: COMMERCIAL

## 2022-06-22 DIAGNOSIS — J84.9 INTERSTITIAL LUNG DISEASE (HCC): ICD-10-CM

## 2022-06-22 PROCEDURE — 94618 PULMONARY STRESS TESTING: CPT

## 2022-06-22 PROCEDURE — 94729 DIFFUSING CAPACITY: CPT | Performed by: INTERNAL MEDICINE

## 2022-06-22 PROCEDURE — 94726 PLETHYSMOGRAPHY LUNG VOLUMES: CPT

## 2022-06-22 PROCEDURE — 94726 PLETHYSMOGRAPHY LUNG VOLUMES: CPT | Performed by: INTERNAL MEDICINE

## 2022-06-22 PROCEDURE — 94729 DIFFUSING CAPACITY: CPT

## 2022-06-22 PROCEDURE — 94618 PULMONARY STRESS TESTING: CPT | Performed by: INTERNAL MEDICINE

## 2022-06-22 PROCEDURE — 94060 EVALUATION OF WHEEZING: CPT

## 2022-06-22 PROCEDURE — 94060 EVALUATION OF WHEEZING: CPT | Performed by: INTERNAL MEDICINE

## 2022-06-22 RX ADMIN — ALBUTEROL SULFATE 2.5 MG: 2.5 SOLUTION RESPIRATORY (INHALATION) at 13:40

## 2022-06-27 DIAGNOSIS — I10 HYPERTENSION, UNSPECIFIED TYPE: ICD-10-CM

## 2022-06-27 RX ORDER — OLMESARTAN MEDOXOMIL 40 MG/1
TABLET ORAL
Qty: 90 TABLET | Refills: 0 | Status: SHIPPED | OUTPATIENT
Start: 2022-06-27

## 2022-07-11 ENCOUNTER — LAB (OUTPATIENT)
Dept: LAB | Facility: HOSPITAL | Age: 67
End: 2022-07-11
Payer: COMMERCIAL

## 2022-07-11 DIAGNOSIS — E03.9 HYPOTHYROIDISM (ACQUIRED): ICD-10-CM

## 2022-07-11 DIAGNOSIS — R25.2 CRAMPS OF LOWER EXTREMITY: ICD-10-CM

## 2022-07-11 DIAGNOSIS — E78.00 PURE HYPERCHOLESTEROLEMIA: Primary | ICD-10-CM

## 2022-07-11 LAB
ANION GAP SERPL CALCULATED.3IONS-SCNC: 5 MMOL/L (ref 5–14)
BASOPHILS # BLD AUTO: 0.04 THOUSANDS/ΜL (ref 0–0.1)
BASOPHILS NFR BLD AUTO: 1 % (ref 0–1)
BUN SERPL-MCNC: 22 MG/DL (ref 5–25)
CALCIUM SERPL-MCNC: 8.9 MG/DL (ref 8.4–10.2)
CHLORIDE SERPL-SCNC: 107 MMOL/L (ref 97–108)
CHOLEST SERPL-MCNC: 183 MG/DL
CO2 SERPL-SCNC: 30 MMOL/L (ref 22–30)
CREAT SERPL-MCNC: 0.64 MG/DL (ref 0.6–1.2)
EOSINOPHIL # BLD AUTO: 0.22 THOUSAND/ΜL (ref 0–0.61)
EOSINOPHIL NFR BLD AUTO: 3 % (ref 0–6)
ERYTHROCYTE [DISTWIDTH] IN BLOOD BY AUTOMATED COUNT: 14.3 % (ref 11.6–15.1)
GFR SERPL CREATININE-BSD FRML MDRD: 93 ML/MIN/1.73SQ M
GLUCOSE P FAST SERPL-MCNC: 122 MG/DL (ref 70–99)
HCT VFR BLD AUTO: 42.6 % (ref 34.8–46.1)
HDLC SERPL-MCNC: 45 MG/DL
HGB BLD-MCNC: 14 G/DL (ref 11.5–15.4)
IMM GRANULOCYTES # BLD AUTO: 0.04 THOUSAND/UL (ref 0–0.2)
IMM GRANULOCYTES NFR BLD AUTO: 1 % (ref 0–2)
LDLC SERPL CALC-MCNC: 114 MG/DL
LYMPHOCYTES # BLD AUTO: 3.11 THOUSANDS/ΜL (ref 0.6–4.47)
LYMPHOCYTES NFR BLD AUTO: 40 % (ref 14–44)
MAGNESIUM SERPL-MCNC: 2.1 MG/DL (ref 1.6–2.3)
MCH RBC QN AUTO: 26.3 PG (ref 26.8–34.3)
MCHC RBC AUTO-ENTMCNC: 32.9 G/DL (ref 31.4–37.4)
MCV RBC AUTO: 80 FL (ref 82–98)
MONOCYTES # BLD AUTO: 0.78 THOUSAND/ΜL (ref 0.17–1.22)
MONOCYTES NFR BLD AUTO: 10 % (ref 4–12)
NEUTROPHILS # BLD AUTO: 3.51 THOUSANDS/ΜL (ref 1.85–7.62)
NEUTS SEG NFR BLD AUTO: 45 % (ref 43–75)
NRBC BLD AUTO-RTO: 0 /100 WBCS
PLATELET # BLD AUTO: 293 THOUSANDS/UL (ref 149–390)
PMV BLD AUTO: 11 FL (ref 8.9–12.7)
POTASSIUM SERPL-SCNC: 4 MMOL/L (ref 3.6–5)
RBC # BLD AUTO: 5.32 MILLION/UL (ref 3.81–5.12)
SODIUM SERPL-SCNC: 142 MMOL/L (ref 137–147)
TRIGL SERPL-MCNC: 118 MG/DL
TSH SERPL DL<=0.05 MIU/L-ACNC: 1.81 UIU/ML (ref 0.45–4.5)
WBC # BLD AUTO: 7.7 THOUSAND/UL (ref 4.31–10.16)

## 2022-07-11 PROCEDURE — 80061 LIPID PANEL: CPT

## 2022-07-11 PROCEDURE — 85025 COMPLETE CBC W/AUTO DIFF WBC: CPT

## 2022-07-11 PROCEDURE — 84443 ASSAY THYROID STIM HORMONE: CPT

## 2022-07-11 PROCEDURE — 36415 COLL VENOUS BLD VENIPUNCTURE: CPT

## 2022-07-11 PROCEDURE — 83735 ASSAY OF MAGNESIUM: CPT

## 2022-07-11 PROCEDURE — 80048 BASIC METABOLIC PNL TOTAL CA: CPT

## 2022-07-20 ENCOUNTER — OFFICE VISIT (OUTPATIENT)
Dept: PULMONOLOGY | Facility: CLINIC | Age: 67
End: 2022-07-20
Payer: COMMERCIAL

## 2022-07-20 VITALS
SYSTOLIC BLOOD PRESSURE: 132 MMHG | BODY MASS INDEX: 42.62 KG/M2 | TEMPERATURE: 96.6 F | DIASTOLIC BLOOD PRESSURE: 70 MMHG | OXYGEN SATURATION: 95 % | HEART RATE: 57 BPM | WEIGHT: 233 LBS

## 2022-07-20 DIAGNOSIS — J45.909 REACTIVE AIRWAY DISEASE WITHOUT COMPLICATION, UNSPECIFIED ASTHMA SEVERITY, UNSPECIFIED WHETHER PERSISTENT: ICD-10-CM

## 2022-07-20 DIAGNOSIS — I28.8 ENLARGED PULMONARY ARTERY (HCC): ICD-10-CM

## 2022-07-20 DIAGNOSIS — J84.9 INTERSTITIAL LUNG DISEASE (HCC): Primary | ICD-10-CM

## 2022-07-20 PROBLEM — I27.21 PULMONARY ARTERIAL HYPERTENSION (HCC): Status: RESOLVED | Noted: 2021-01-13 | Resolved: 2022-07-20

## 2022-07-20 PROCEDURE — 99214 OFFICE O/P EST MOD 30 MIN: CPT | Performed by: INTERNAL MEDICINE

## 2022-07-20 NOTE — PROGRESS NOTES
Pulmonary Follow Up Note   Ani Tipton 78 y o  female MRN: [id number]  7/20/2022    Assessment:  Interstitial lung disease   · Unclassified/unknown etiology, no significant occupational exposure, negative rheumatologic serology  · Very mild reticulation/sub pleural, at lateral segment of RML, inferior lingula, lower lobes   · PFT/TLC trend showing noted significant changes, last PFT actually showed TLC of 91% which is normal compared to 63% in 09/2021  · No exertional hypoxemia on 6 minute walk test    Plan:   · Given the stability, lack of symptoms and no oxygen requirements will continue surveillance   · Recheck HRCT, last done in 04/2021    Enlarged pulmonary artery  · Noted on CT chest, PA size was 3 cm and on repeat  · Serial TTE from 2019 until 11/2021 showed mild increase in PA SP  · TTE in 11/2021 Estimated PA SP 26 mm Hg  · No exertional hypoxemia, normal S1/S2  · No risk factors identified for pulmonary hypertension    Plan:   · Repeat TTE   · No current indication for PH workup given the lack of symptoms, no exertional hypoxemia or signs of elevated right-sided pressures      Allergic rhinitis/reactive airway disease   · PFT with positive bronchodilator response at the level small airways   · Noted intermittent/minimally productive cough with exposure to extreme temperature, during the fall/winter  · Started Advair 100 in 10/2021, could not tolerated due to taste/some back pain     Plan:   · Feeling better, Continue Allegra, p r n  albuterol   · May discontinue Advair     Return in about 3 months (around 10/20/2022)      History of Present Illness     Follow up for: ILD/enlarged PA on CT    Background:  78 y o  female with a h/o  reflux, morbid obesity, DM2, seasonal allergies      She was initially referred to Pulmonary for evaluation of possible pulmonary hypertension, noted enlarged PA on CT chest   Repeat TTE showed no signs of pulmonary hypertension, however noted peripheral GGOs/sub pleural nasal predominance, moderately reduced TLC at 65%   No exertional hypoxemia, negative family history of lung fibrosis, occupational/environmental hazards exposure       7/2 visit-negative rheumatologic serology including ANCA, RF/CCP, CK/aldolase, BRENDA and subsequent biomarkers   PFT showed positive bronchodilator response at the level of small airways, no change in lung volumes     10/27 visit-recommended repeat TTE to check for signs of PH  Discontinue Spiriva, switched to Advair 100    11/24/2021 visit-repeat PFT in 6 minutes from last   Discontinued Advair  Interval History  Since last seen, no major events or illness  Using p r n  albuterol no more than 3 times a week  Otherwise active and independent 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  Still with dyspnea on moderate to severe exertion unchanged from before  No associated diaphoresis, angina, does not have to stop when doing activities  Gained approximately 15 lb over the past 4 months  Attributed to increase calories intake  Review of Systems  As per hpi, all other systems reviewed and were negative  Studies:  Imaging and other studies: I have personally reviewed pertinent films in PACS       CT chest 10/07/2020-no pulmonary embolism   Peripheral ground-glass opacities, some subpleural reticulation at the bases       CT chest/high-resolution 04/24/2021-minimal sub pleural/basal predominant reticulations, ground-glass opacities unchanged from 10/2020       Pulmonary function testing:      PFT 01/27/2021-ratio of 82%, FEV1 1 33 L/67%, FVC 1 63 L/65%, no significant response to bronchodilator   TLC reduced at 64%, RV 59%   DLCO 69% of predicted     PFT 09/08/2021-ratio 80%, FEV1 1 41 L/72%, FVC 1 76 L/71%   TLC 63%, RV 57%, DLCO 66%   Positive BD response at the level of small airways    PFT 06/22/2022-ratio 76%, FEV1 1 36 L/72%, FVC 1 77 L/75%  TLC 91%, %  DLCO 72%      6 minutes walk test 01/27/2021-baseline 96% on room air, heart rate 61  Able to walk 420 m in 6 minutes, lowest SpO2 92% on room air, maximal heart rate 101     6 minute walk test 09/08/2021-Baseline SpO2 90% on room air heart rate 69   Able to walk 6 minutes, lowest SpO2 of 88% at the end of exercise, maximal heart rate 104   Noted a significant oxygen desaturation with exertion     6 minute walk test 11/24/2021-baseline SpO2 96 percent on room air, heart rate 65  Able to walk 336 meters in 6 minutes, lowest SpO2 92 percent  Maximal heart rate 94    6 minute walk test 06/22/2022-baseline SpO2 95% on room air  Heart rate 90  Able to walk 365 m in 6 minutes without stop  Lowest SpO2 at 93%, maximal heart rate 120      EKG, Pathology, and Other Studies: I have personally reviewed pertinent reports        TTE 05/15/2019-EF 63%   Mild LV hypertrophy   Pulmonary artery systolic pressure estimated to be normal, normal size   TAPSE 2 51     TTE 03/31/2021-EF 65%, no WMA, mild increase wall thickness/eccentric hypertrophy, mildly dilated LA with septum moved very mildly bowing from the left to the right suggesting increased LA pressure trace regurgitation at the mitral valve   Estimated PA SP of 22 mm mercury   Very small/physiologic pericardial effusion   TAPSE 3 8     TTE 32/02/9390-WPQGAH LV size, systolic function  Diastolic function mildly abnormal, grade 1 diastolic dysfunction  Try vial pericardial effusion posterior to the heart  Normal RA/LA size  Normal RV size and function  Estimated PA SP normal at 26 millimeter Hg     Serology   Normal the following:  Anca, CCP/RF, CK/aldolase, BRENDA/11 biomarkers      Past medical, surgical, social and family histories reviewed  Medications/Allergies: Reviewed      Vitals: Blood pressure 132/70, pulse 57, temperature (!) 96 6 °F (35 9 °C), weight 106 kg (233 lb), SpO2 95 %, not currently breastfeeding  Body mass index is 42 62 kg/m²   Oxygen Therapy  SpO2: 95 %  Oxygen Therapy: None (Room air)      Physical Exam  Body mass index is 42 62 kg/m²  Gen: not in acute distress, obese  Neck/Eyes: supple, no adenopathy, PERRL  Ear: normal appearance, no significant hearing impairment  Nose:  normal nasal mucosa, no drainage  Mouth:  unremarkable/normal appearance of lips, teeth and gums  Oropharynx: mucosa is moist, no focal lesions or erythema  Salivary glands: soft nontender  Chest: normal respiratory efforts, clear breath sounds bilaterally  CV: RRR, no murmurs appreciated, trace above ankle pitting edema  Abdomen: soft, non tender  Extremities:  No observed deformity   Skin: unremarkable  Neuro: AAO X3, no focal motor deficit        Labs:  Lab Results   Component Value Date    WBC 7 70 07/11/2022    HGB 14 0 07/11/2022    HCT 42 6 07/11/2022    MCV 80 (L) 07/11/2022     07/11/2022     Lab Results   Component Value Date    CALCIUM 8 9 07/11/2022    K 4 0 07/11/2022    CO2 30 07/11/2022     07/11/2022    BUN 22 07/11/2022    CREATININE 0 64 07/11/2022     No results found for: IGE  Lab Results   Component Value Date    ALT 17 02/11/2022    AST 25 02/11/2022    ALKPHOS 68 02/11/2022           Portions of the record may have been created with voice recognition software  Occasional wrong word or "sound a like" substitutions may have occurred due to the inherent limitations of voice recognition software  Read the chart carefully and recognize, using context, where substitutions have occurred    [facility]

## 2022-07-25 NOTE — RESULT ENCOUNTER NOTE
Dear Dior Ear:  Caleb Oneill from two weeks ago reported normal numbers with certain exceptions the are not relevant but we will follow-up you next physical   No changes at this time  Keep same treatment

## 2022-07-26 ENCOUNTER — OFFICE VISIT (OUTPATIENT)
Dept: DENTISTRY | Facility: CLINIC | Age: 67
End: 2022-07-26

## 2022-07-26 VITALS — SYSTOLIC BLOOD PRESSURE: 118 MMHG | TEMPERATURE: 97.2 F | DIASTOLIC BLOOD PRESSURE: 78 MMHG | HEART RATE: 81 BPM

## 2022-07-26 DIAGNOSIS — Z01.20 ENCOUNTER FOR DENTAL EXAMINATION: Primary | ICD-10-CM

## 2022-07-26 PROCEDURE — D0274 BITEWINGS - 4 RADIOGRAPHIC IMAGES: HCPCS | Performed by: DENTAL HYGIENIST

## 2022-07-26 PROCEDURE — D1110 PROPHYLAXIS - ADULT: HCPCS | Performed by: DENTAL HYGIENIST

## 2022-07-26 PROCEDURE — D0120 PERIODIC ORAL EVALUATION - ESTABLISHED PATIENT: HCPCS | Performed by: DENTAL HYGIENIST

## 2022-07-26 NOTE — PROGRESS NOTES
Dental procedures in this visit     - PROPHYLAXIS - ADULT (Completed)     Service provider: Loraine Renteria [facility]     Billing provider: Ken Cruz DDS     - PERIODIC ORAL EVALUATION - ESTABLISHED PATIENT (Completed)     Service provider: Sherryll Dandy, DDS     Billing provider: Zahraa Leiva Bobbi Deleon (Completed)     Service provider: Loraine Renteria [facility]     Billing provider: Ken Cruz DDS     Subjective   Patient ID: Rhonda Carpio is a 78 y o  female  Chief Complaint   Patient presents with    Periodic Exam    Routine Oral Cleaning     Adult Prophy    ASA II  Pain:  0  Reviewed M/DH     Exams:  Periodic exam   Xrays:   4 vertical  BWX    Type of Treatment:  Adult Prophy -  Hand scaling,  Polished, Flossed  Reviewed OHI  Brush:  2X/day and Floss 1X/day  Recommended Listerine  / ACT  mouth rinses  EO/OCS Exams:  No significant findings  IO: No significant findings  Oral Hygiene:  Good   Plaque:  Light    Calculus:  Light   Bleeding:  none  Gingiva:  Pink  / Firm   Stain:  none  Perio Charting:  Periocharting was not completed  Probe at next recall  Perio Findings:  Healthy gingiva  Generalized recession    Caries Findings:  Watch 8, 10  Caries Risk Assessment:   Moderate caries risk    Treatment Plan:  Updated   Dr  Exam:  Dr Yancy Desai  Referral:  No referral given  NV1:  6mrc - 50 min   roge/ Carmen Marinelli

## 2022-08-05 ENCOUNTER — HOSPITAL ENCOUNTER (OUTPATIENT)
Dept: CT IMAGING | Facility: HOSPITAL | Age: 67
Discharge: HOME | End: 2022-08-05
Attending: INTERNAL MEDICINE
Payer: COMMERCIAL

## 2022-08-05 ENCOUNTER — HOSPITAL ENCOUNTER (OUTPATIENT)
Dept: NON INVASIVE DIAGNOSTICS | Facility: HOSPITAL | Age: 67
Discharge: HOME | End: 2022-08-05
Attending: INTERNAL MEDICINE
Payer: COMMERCIAL

## 2022-08-05 VITALS
HEART RATE: 81 BPM | DIASTOLIC BLOOD PRESSURE: 78 MMHG | WEIGHT: 233 LBS | HEIGHT: 62 IN | SYSTOLIC BLOOD PRESSURE: 118 MMHG | BODY MASS INDEX: 42.88 KG/M2

## 2022-08-05 DIAGNOSIS — J45.909 REACTIVE AIRWAY DISEASE WITHOUT COMPLICATION, UNSPECIFIED ASTHMA SEVERITY, UNSPECIFIED WHETHER PERSISTENT: ICD-10-CM

## 2022-08-05 DIAGNOSIS — J84.9 INTERSTITIAL LUNG DISEASE (HCC): ICD-10-CM

## 2022-08-05 DIAGNOSIS — I28.8 ENLARGED PULMONARY ARTERY (HCC): ICD-10-CM

## 2022-08-05 LAB
AORTIC ROOT: 3 CM
APICAL FOUR CHAMBER EJECTION FRACTION: 61 %
ASCENDING AORTA: 3.4 CM
E WAVE DECELERATION TIME: 187 MS
FRACTIONAL SHORTENING: 38 % (ref 28–44)
INTERVENTRICULAR SEPTUM IN DIASTOLE (PARASTERNAL SHORT AXIS VIEW): 1.2 CM
INTERVENTRICULAR SEPTUM: 1.2 CM (ref 0.6–1.1)
LAAS-AP2: 20.2 CM2
LAAS-AP4: 16.4 CM2
LEFT ATRIUM AREA SYSTOLE SINGLE PLANE A4C: 16.7 CM2
LEFT ATRIUM SIZE: 4.4 CM
LEFT INTERNAL DIMENSION IN SYSTOLE: 2.8 CM (ref 2.1–4)
LEFT VENTRICULAR INTERNAL DIMENSION IN DIASTOLE: 4.5 CM (ref 3.5–6)
LEFT VENTRICULAR POSTERIOR WALL IN END DIASTOLE: 1.2 CM
LEFT VENTRICULAR STROKE VOLUME: 61 ML
LVSV (TEICH): 61 ML
MV E'TISSUE VEL-LAT: 8 CM/S
MV E'TISSUE VEL-SEP: 7 CM/S
MV PEAK A VEL: 0.82 M/S
MV PEAK E VEL: 82 CM/S
MV STENOSIS PRESSURE HALF TIME: 54 MS
MV VALVE AREA P 1/2 METHOD: 4.07 CM2
RA PRESSURE ESTIMATED: 3 MMHG
RIGHT ATRIUM AREA SYSTOLE A4C: 15.1 CM2
RIGHT VENTRICLE ID DIMENSION: 3.3 CM
RV PSP: 19 MMHG
SL CV LEFT ATRIUM LENGTH A2C: 5.5 CM
SL CV LV EF: 70
SL CV PED ECHO LEFT VENTRICLE DIASTOLIC VOLUME (MOD BIPLANE) 2D: 91 ML
SL CV PED ECHO LEFT VENTRICLE SYSTOLIC VOLUME (MOD BIPLANE) 2D: 29 ML
TR MAX PG: 16 MMHG
TR PEAK VELOCITY: 2 M/S
TRICUSPID VALVE PEAK REGURGITATION VELOCITY: 1.97 M/S

## 2022-08-05 PROCEDURE — 93306 TTE W/DOPPLER COMPLETE: CPT

## 2022-08-05 PROCEDURE — G1004 CDSM NDSC: HCPCS

## 2022-08-05 PROCEDURE — 71250 CT THORAX DX C-: CPT

## 2022-10-11 PROBLEM — R05.9 COUGH: Status: RESOLVED | Noted: 2022-05-09 | Resolved: 2022-10-11

## 2022-10-14 ENCOUNTER — TELEMEDICINE (OUTPATIENT)
Dept: FAMILY MEDICINE CLINIC | Facility: CLINIC | Age: 67
End: 2022-10-14
Payer: COMMERCIAL

## 2022-10-14 ENCOUNTER — NURSE TRIAGE (OUTPATIENT)
Dept: OTHER | Facility: OTHER | Age: 67
End: 2022-10-14

## 2022-10-14 DIAGNOSIS — J45.21 MILD INTERMITTENT ASTHMA WITH ACUTE EXACERBATION: ICD-10-CM

## 2022-10-14 DIAGNOSIS — U07.1 COVID-19: Primary | ICD-10-CM

## 2022-10-14 PROBLEM — I83.90 VARICOSE VEINS OF LOWER EXTREMITY: Status: ACTIVE | Noted: 2022-10-14

## 2022-10-14 PROBLEM — M25.473 ANKLE SWELLING: Status: RESOLVED | Noted: 2022-10-14 | Resolved: 2022-10-14

## 2022-10-14 PROBLEM — H53.2 DIPLOPIA: Status: ACTIVE | Noted: 2022-10-14

## 2022-10-14 PROBLEM — L60.0 INGROWN TOENAIL: Status: ACTIVE | Noted: 2022-10-14

## 2022-10-14 PROBLEM — E07.9 DISORDER OF THYROID: Status: ACTIVE | Noted: 2022-10-14

## 2022-10-14 PROBLEM — H53.2 DIPLOPIA: Status: RESOLVED | Noted: 2022-10-14 | Resolved: 2022-10-14

## 2022-10-14 PROBLEM — M25.473 ANKLE SWELLING: Status: ACTIVE | Noted: 2022-10-14

## 2022-10-14 PROBLEM — M79.673 HEEL PAIN: Status: ACTIVE | Noted: 2022-05-09

## 2022-10-14 PROCEDURE — 99213 OFFICE O/P EST LOW 20 MIN: CPT | Performed by: PHYSICIAN ASSISTANT

## 2022-10-14 RX ORDER — PREDNISONE 20 MG/1
20 TABLET ORAL DAILY
Qty: 5 TABLET | Refills: 0 | Status: SHIPPED | OUTPATIENT
Start: 2022-10-14 | End: 2022-10-26

## 2022-10-14 RX ORDER — NIRMATRELVIR AND RITONAVIR 300-100 MG
3 KIT ORAL 2 TIMES DAILY
Qty: 30 TABLET | Refills: 0 | Status: SHIPPED | OUTPATIENT
Start: 2022-10-14 | End: 2022-10-19

## 2022-10-14 NOTE — ASSESSMENT & PLAN NOTE
Reviewed previous PFTs in 06/2022  Pulmonology discontinued Wixela due to not well tolerated by patient, improved PFTs and controlled symptoms on as needed albuterol  Will start pulse dose of prednisone x5 days for asthma exacerbated by COVID

## 2022-10-14 NOTE — TELEPHONE ENCOUNTER
Reason for Disposition  • HIGH RISK for severe COVID complications (e g , weak immune system, age > 59 years, obesity with BMI > 22, pregnant, chronic lung disease or other chronic medical condition) (Exception: Already seen by PCP and no new or worsening symptoms )    Answer Assessment - Initial Assessment Questions  1  COVID-19 DIAGNOSIS: "Who made your COVID-19 diagnosis?" "Was it confirmed by a positive lab test or self-test?" If not diagnosed by a doctor (or NP/PA), ask "Are there lots of cases (community spread) where you live?" Note: See public health department website, if unsure  Home test   2  COVID-19 EXPOSURE: "Was there any known exposure to COVID before the symptoms began?" CDC Definition of close contact: within 6 feet (2 meters) for a total of 15 minutes or more over a 24-hour period  Unsure   3  ONSET: "When did the COVID-19 symptoms start?"       Friday  4  WORST SYMPTOM: "What is your worst symptom?" (e g , cough, fever, shortness of breath, muscle aches)      cough  5  COUGH: "Do you have a cough?" If Yes, ask: "How bad is the cough?"        moderate  6  FEVER: "Do you have a fever?" If Yes, ask: "What is your temperature, how was it measured, and when did it start?"      no  7  RESPIRATORY STATUS: "Describe your breathing?" (e g , shortness of breath, wheezing, unable to speak)       wheezing  8  BETTER-SAME-WORSE: "Are you getting better, staying the same or getting worse compared to yesterday?"  If getting worse, ask, "In what way?"     Worse   9  HIGH RISK DISEASE: "Do you have any chronic medical problems?" (e g , asthma, heart or lung disease, weak immune system, obesity, etc )      Asthma   10  VACCINE: "Have you had the COVID-19 vaccine?" If Yes, ask: "Which one, how many shots, when did you get it?"        Yes   11  BOOSTER: "Have you received your COVID-19 booster?" If Yes, ask: "Which one and when did you get it?"        Yes  12   PREGNANCY: "Is there any chance you are pregnant?" "When was your last menstrual period?"        No   13  OTHER SYMPTOMS: "Do you have any other symptoms?"  (e g , chills, fatigue, headache, loss of smell or taste, muscle pain, sore throat)        Chills cold sweats  14   O2 SATURATION MONITOR:  "Do you use an oxygen saturation monitor (pulse oximeter) at home?" If Yes, ask "What is your reading (oxygen level) today?" "What is your usual oxygen saturation reading?" (e g , 95%)        No    Protocols used: CORONAVIRUS (COVID-19) DIAGNOSED OR SUSPECTED-ADULT-[place]

## 2022-10-14 NOTE — TELEPHONE ENCOUNTER
Regarding: Covid positive scratchy throat cold sweats cough chills body aches medication  ----- Message from Juanjose Gray sent at 10/14/2022  1:32 PM EDT -----  "I tested positive for covid today  My symptoms started on Friday with a scratchy throat  I have cold sweats, cough, chills, and body aches  I need something called into the pharmacy  "

## 2022-10-14 NOTE — PROGRESS NOTES
COVID-19 Outpatient Progress Note    Assessment/Plan:    Problem List Items Addressed This Visit        Respiratory    Asthma     Reviewed previous PFTs in 06/2022  Pulmonology discontinued Wixela due to not well tolerated by patient, improved PFTs and controlled symptoms on as needed albuterol  Will start pulse dose of prednisone x5 days for asthma exacerbated by COVID  Relevant Medications    predniSONE 20 mg tablet      Other Visit Diagnoses     COVID-19    -  Primary    Day 4  Start Paxlovid x 5 days due to history of restrictive lung disease  Maintain isolation for 5 days and masked for 10 days  Rest, hydrate  Hold statin  Relevant Medications    nirmatrelvir & ritonavir (Paxlovid, 300/100,) tablet therapy pack         Disposition:     Discussed symptom directed medication options with patient  did not refer to ED, patient not in acute respiratory distress on exam, discussed reasons to proceed to ER if worsening     Patient meets criteria for PAXLOVID and they have been counseled appropriately according to EUA documentation released by the FDA  After discussion, patient agrees to treatment  [address] is an investigational medicine used to treat mild-to-moderate COVID-19 in adults and children (15years of age and older weighing at least 80 pounds (40 kg)) with positive results of direct SARS-CoV-2 viral testing, and who are at high risk for progression to severe COVID-19, including hospitalization or death  PAXLOVID is investigational because it is still being studied  There is limited information about the safety and effectiveness of using PAXLOVID to treat people with mild-to-moderate COVID-19      The FDA has authorized the emergency use of PAXLOVID for the treatment of mild-tomoderate COVID-19 in adults and children (15years of age and older weighing at least 80 pounds (40 kg)) with a positive test for the virus that causes COVID-19, and who are at high risk for progression to severe COVID-19, including hospitalization or death, under an EUA  What should I tell my healthcare provider before I take PAXLOVID? Tell your healthcare provider if you:  - Have any allergies  - Have liver or kidney disease  - Are pregnant or plan to become pregnant  - Are breastfeeding a child  - Have any serious illnesses    Tell your healthcare provider about all the medicines you take, including prescription and over-the-counter medicines, vitamins, and herbal supplements  Some medicines may interact with PAXLOVID and may cause serious side effects  Keep a list of your medicines to show your healthcare provider and pharmacist when you get a new medicine  You can ask your healthcare provider or pharmacist for a list of medicines that interact with PAXLOVID  Do not start taking a new medicine without telling your healthcare provider  Your healthcare provider can tell you if it is safe to take PAXLOVID with other medicines  Tell your healthcare provider if you are taking combined hormonal contraceptive  PAXLOVID may affect how your birth control pills work  Females who are able to become pregnant should use another effective alternative form of contraception or an additional barrier method of contraception  Talk to your healthcare provider if you have any questions about contraceptive methods that might be right for you  How do I take PAXLOVID? PAXLOVID consists of 2 medicines: nirmatrelvir and ritonavir  - Take 2 pink tablets of nirmatrelvir with 1 white tablet of ritonavir by mouth 2 times each day (in the morning and in the evening) for 5 days  For each dose, take all 3 tablets at the same time  - If you have kidney disease, talk to your healthcare provider  You may need a different dose  - Swallow the tablets whole  Do not chew, break, or crush the tablets  - Take PAXLOVID with or without food    - Do not stop taking PAXLOVID without talking to your healthcare provider, even if you feel better  - If you miss a dose of PAXLOVID within 8 hours of the time it is usually taken, take it as soon as you remember  If you miss a dose by more than 8 hours, skip the missed dose and take the next dose at your regular time  Do not take 2 doses of PAXLOVID at the same time  - If you take too much PAXLOVID, call your healthcare provider or go to the nearest hospital emergency room right away  - If you are taking a ritonavir- or cobicistat-containing medicine to treat hepatitis C or Human Immunodeficiency Virus (HIV), you should continue to take your medicine as prescribed by your healthcare provider   - Talk to your healthcare provider if you do not feel better or if you feel worse after 5 days  Who should generally not take PAXLOVID? Do not take PAXLOVID if:  You are allergic to nirmatrelvir, ritonavir, or any of the ingredients in PAXLOVID  You are taking any of the following medicines:  - Alfuzosin  - Pethidine, piroxicam, propoxyphene  - Ranolazine  - Amiodarone, dronedarone, flecainide, propafenone, quinidine  - Colchicine  - Lurasidone, pimozide, clozapine  - Dihydroergotamine, ergotamine, methylergonovine  - Lovastatin, simvastatin  - Sildenafil (Revatio®) for pulmonary arterial hypertension (PAH)  - Triazolam, oral midazolam  - Apalutamide  - Carbamazepine, phenobarbital, phenytoin  - Rifampin  - St  Alex’s Wort (hypericum perforatum)    What are the important possible side effects of PAXLOVID? Possible side effects of PAXLOVID are:  - Liver Problems  Tell your healthcare provider right away if you have any of these signs and symptoms of liver problems: loss of appetite, yellowing of your skin and the whites of eyes (jaundice), dark-colored urine, pale colored stools and itchy skin, stomach area (abdominal) pain  - Resistance to HIV Medicines  If you have untreated HIV infection, PAXLOVID may lead to some HIV medicines not working as well in the future    - Other possible side effects include: altered sense of taste, diarrhea, high blood pressure, or muscle aches    These are not all the possible side effects of PAXLOVID  Not many people have taken PAXLOVID  Serious and unexpected side effects may happen  Alver Rolls is still being studied, so it is possible that all of the risks are not known at this time  What other treatment choices are there? Like [facility] may allow for the emergency use of other medicines to treat people with COVID-19  Go to https://SideTour/ for information on the emergency use of other medicines that are authorized by FDA to treat people with COVID-19  Your healthcare provider may talk with you about clinical trials for which you may be eligible  It is your choice to be treated or not to be treated with PAXLOVID  Should you decide not to receive it or for your child not to receive it, it will not change your standard medical care  What if I am pregnant or breastfeeding? There is no experience treating pregnant women or breastfeeding mothers with PAXLOVID  For a mother and unborn baby, the benefit of taking PAXLOVID may be greater than the risk from the treatment  If you are pregnant, discuss your options and specific situation with your healthcare provider  It is recommended that you use effective barrier contraception or do not have sexual activity while taking PAXLOVID  If you are breastfeeding, discuss your options and specific situation with your healthcare provider  How do I report side effects with PAXLOVID? Contact your healthcare provider if you have any side effects that bother you or do not go away  Report side effects to FDA MedWatch at www fda gov/medwatch or call 6-590-ASL3505 or you can report side effects to [facility]  at the contact information provided below      Website Fax number Telephone number www [employer] [phone]     How should I store Aby Gregory? Store PAXLOVID tablets at room temperature between 68°F to 77°F (20°C to 25°C)  Full fact sheet for patients, parents, and caregivers can be found at: [employer]    I have spent 15 minutes directly with the patient  Greater than 50% of this time was spent in counseling/coordination of care regarding: risks and benefits of treatment options and instructions for management  Encounter provider: Alejandro Barbosa PA-C     Provider located at: [facility] AT [address]  [address] [place] [address] [phone]     Recent Visits  No visits were found meeting these conditions  Showing recent visits within past 7 days and meeting all other requirements  Today's Visits  Date Type Provider Dept   10/14/22 Telemedicine Alejandro Barbosa PA-C [facility]   Showing today's visits and meeting all other requirements  Future Appointments  No visits were found meeting these conditions  Showing future appointments within next 150 days and meeting all other requirements     This virtual check-in was done via [employer] and patient was informed that this is a secure, HIPAA-compliant platform  She agrees to proceed  Patient agrees to participate in a virtual check in via telephone or video visit instead of presenting to the office to address urgent/immediate medical needs  Patient is aware this is a billable service  She acknowledged consent and understanding of privacy and security of the video platform  The patient has agreed to participate and understands they can discontinue the visit at any time  After connecting through [facility], the patient was identified by name and date of birth   Malia Mcgregor was informed that this was a telemedicine visit and that the exam was being conducted confidentially over secure lines  My office door was closed  No one else was in the room  Shaila Flores acknowledged consent and understanding of privacy and security of the telemedicine visit  I informed the patient that I have reviewed her record in Epic and presented the opportunity for her to ask any questions regarding the visit today  The patient agreed to participate  Verification of patient location:  Patient is located in the following state in which I hold an active license: [place]    Subjective:   Shaila Flores is a 78 y o  female who is concerned about COVID-19  Patient's symptoms include fever (Tmax 103F, resolved), chills, fatigue, malaise, nasal congestion, sore throat, loss of taste (still present, but not complete), cough (mucus), shortness of breath (tightness improves with inhaler), chest tightness, myalgias and headache  Patient denies rhinorrhea, anosmia, abdominal pain, nausea, vomiting and diarrhea  - Date of symptom onset: 10/11/2022      COVID-19 vaccination status: Fully vaccinated (primary series)    Exposure:   Contact with a person who is under investigation (PUI) for or who is positive for COVID-19 within the last 14 days?: No    Hospitalized recently for fever and/or lower respiratory symptoms?: No      Currently a healthcare worker that is involved in direct patient care?: Yes      Works in a special setting where the risk of COVID-19 transmission may be high? (this may include long-term care, correctional and group home facilities; homeless shelters; assisted-living facilities and group homes ): No      Resident in a special setting where the risk of COVID-19 transmission may be high? 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  Symptoms started on Tuesday which she attributed to seasonal allergies but got progressively worse with fever that resolved on Wednesday  She was stopped with her daily inhaler by pulmonology as her asthma had been well-controlled  She has been requiring use of albuterol still with tightness in the chest      Lab Results   Component Value Date    SARSCOV2 Negative 01/04/2022    SARSCOV2 Not Detected 10/08/2020       Review of Systems   Constitutional: Positive for chills, fatigue and fever (Tmax 103F, resolved)  HENT: Positive for congestion and sore throat  Negative for rhinorrhea  Respiratory: Positive for cough (mucus), chest tightness and shortness of breath (tightness improves with inhaler)  Negative for wheezing  Cardiovascular: Negative for chest pain  Gastrointestinal: Negative for abdominal pain, diarrhea, nausea and vomiting  Musculoskeletal: Positive for myalgias  Neurological: Positive for headaches  Current Outpatient Medications on File Prior to Visit   Medication Sig   • albuterol (2 5 mg/3 mL) 0 083 % nebulizer solution USE 1 AMPULE VIA NEBULIZER EVERY 6 HOURS AS NEEDED FOR WHEEZING OR SHORTNESS OF BREATH   • calcium carbonate-vitamin D (OSCAL-D) 500 mg-200 units per tablet Take 1 tablet by mouth daily with breakfast   • cholecalciferol (VITAMIN D3) 1,000 units tablet Take 1 tablet (1,000 Units total) by mouth daily   • fluticasone (FLONASE) 50 mcg/act nasal spray 2 sprays into each nostril daily as needed for rhinitis   • glucose blood test strip Checks blood glucose 3 times a day     • hydrochlorothiazide (HYDRODIURIL) 25 mg tablet Take 1 tablet (25 mg total) by mouth every morning   • hydrocortisone 2 5 % cream Apply topically 4 (four) times a day as needed for irritation   • Lancets (onetouch ultrasoft) lancets Check blood glucose 3 times a day   • levothyroxine 50 mcg tablet Take 1 tablet (50 mcg total) by mouth daily   • Multiple Vitamins-Minerals (MULTIVITAMIN ADULT PO) 1 tablet every 24 hours   • olmesartan (BENICAR) 40 mg tablet TAKE ONE TABLET BY MOUTH EVERY DAY   • pantoprazole (PROTONIX) 40 mg tablet Take 1 tablet (40 mg total) by mouth daily   • rosuvastatin (CRESTOR) 5 mg tablet Take 1 tablet (5 mg total) by mouth daily   • Ventolin  (90 Base) MCG/ACT inhaler Inhale 2 puffs every 6 (six) hours as needed for wheezing   • [DISCONTINUED] fluticasone-salmeterol (Wixela Inhub) 100-50 mcg/dose inhaler Inhale 1 puff 2 (two) times a day Rinse mouth after use  Objective: There were no vitals taken for this visit  Physical Exam  Vitals reviewed: limited physical exam due to telemed visit with technical difficulties, patient able to speak clearly without dyspnea  Constitutional:       General: She is not in acute distress  Appearance: She is obese  She is not toxic-appearing  HENT:      Head: Normocephalic and atraumatic  Neurological:      Mental Status: She is alert         Romulo May PA-C

## 2022-10-18 DIAGNOSIS — I10 HYPERTENSION, UNSPECIFIED TYPE: ICD-10-CM

## 2022-10-18 DIAGNOSIS — Z87.898 HISTORY OF PREDIABETES: ICD-10-CM

## 2022-10-18 RX ORDER — OLMESARTAN MEDOXOMIL 40 MG/1
TABLET ORAL
Qty: 90 TABLET | Refills: 0 | Status: SHIPPED | OUTPATIENT
Start: 2022-10-18

## 2022-10-18 RX ORDER — LANCETS 30 GAUGE
EACH MISCELLANEOUS
Qty: 100 EACH | Refills: 0 | Status: SHIPPED | OUTPATIENT
Start: 2022-10-18

## 2022-10-26 ENCOUNTER — OFFICE VISIT (OUTPATIENT)
Dept: PULMONOLOGY | Facility: CLINIC | Age: 67
End: 2022-10-26
Payer: COMMERCIAL

## 2022-10-26 VITALS
WEIGHT: 228 LBS | HEART RATE: 73 BPM | DIASTOLIC BLOOD PRESSURE: 70 MMHG | BODY MASS INDEX: 41.96 KG/M2 | OXYGEN SATURATION: 96 % | TEMPERATURE: 97.4 F | HEIGHT: 62 IN | SYSTOLIC BLOOD PRESSURE: 108 MMHG

## 2022-10-26 DIAGNOSIS — E66.01 MORBID OBESITY (HCC): ICD-10-CM

## 2022-10-26 DIAGNOSIS — J84.9 INTERSTITIAL LUNG DISEASE (HCC): Primary | ICD-10-CM

## 2022-10-26 DIAGNOSIS — J45.909 MILD ASTHMA WITHOUT COMPLICATION, UNSPECIFIED WHETHER PERSISTENT: ICD-10-CM

## 2022-10-26 PROCEDURE — 99214 OFFICE O/P EST MOD 30 MIN: CPT | Performed by: INTERNAL MEDICINE

## 2022-10-26 NOTE — PROGRESS NOTES
Pulmonary Follow Up Note   Selden Deacon 78 y o  female MRN: [id number]  10/26/2022    Assessment:  Interstitial lung disease   · Mild reticulation/sub pleural at the mid lung fields bilaterally, also mild at the lower lobes   · Unclassified/unknown etiology, no significant environmental/occupational hazards exposure, negative rheumatologic serology  · Serial follow-up since 10/2020 showing stable findings radiographically/and functionally on PFT  · No exertional hypoxemia or limitation of exercise capacity    Plan:   · Continue surveillance, spacer to 8 months from from last approximately in 04/2023   · HRCT, PFT/6 minute walk to be completed in April    Enlarged pulmonary artery  · Noted on CT chest, about 3 cm on repeat imaging   · Serial TTE from 2019, showed PA SP 26 mm Hg in 11/2021   · Repeat TTE in 08/2022 showed RVSP of 19   · No exertional hypoxemia or drop of SpO2 more than 4% on exam, normal S1/no accentuated S2  · No risk factors identified for pulmonary hypertension  · Continue to monitor for now    Recent acute bronchitis  Allergic rhinitis   Reactive airway disease   · Likely with a degree of asthma given the positive BD response on PFT before at the level of small airways   · Off maintenance inhalers from last seen   · Recent exacerbation from viral URI/bronchitis COVID-19 symptoms all resolved now    Plan:   · Continue p r n  albuterol  · Counseled about avoiding exposure to sick contacts    Morbid obesity   · Counseled  · Agreeable for referral to weight management program/ordered      Return in about 6 months (around 4/26/2023)      History of Present Illness    Follow up for: ILD/enlarged PA on CT     Background:  78 y o  female with a h/o  reflux, morbid obesity, DM2, seasonal allergies      She was initially referred to Pulmonary for evaluation of possible pulmonary hypertension, noted enlarged PA on CT chest   Repeat TTE showed no signs of pulmonary hypertension, however noted peripheral GGOs/sub pleural nasal predominance, moderately reduced TLC at 65%   No exertional hypoxemia, negative family history of lung fibrosis, occupational/environmental hazards exposure       7/2 visit-negative rheumatologic serology including ANCA, RF/CCP, CK/aldolase, BRENDA and subsequent biomarkers   PFT showed positive bronchodilator response at the level of small airways, no change in lung volumes     10/27 visit-recommended repeat TTE to check for signs of PH   Discontinue Spiriva, switched to Advair 100     11/24/2021 visit-repeat PFT in 6 minutes from last   Discontinued Advair      07/2022 visit-repeat HRCT no progression/change from prior imaging, TTE  Interval History    10/14, prescribed steroids, paxlovid for asthma/bronchitis and COVID-19 positive PCR  Reported myalgia, body ache, loss of taste and smell with a chest congestion, increased cough  All symptoms resolved, did not use the Paxkovid, only used the steroid burst, no taper  Symptoms nearly resolved with using p r n  albuterol over the past week, no more than few times per week  Otherwise, no change in lifestyle, no new dyspnea, cough, or chest pain  Active and independent at baseline, 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  Review of Systems  As per hpi, all other systems reviewed and were negative    Studies:  Imaging and other studies: I have personally reviewed pertinent films in PACS        CT chest 10/07/2020-no pulmonary embolism   Peripheral ground-glass opacities, some subpleural reticulation at the bases      HRCT 04/24/2021-minimal sub pleural/basal predominant reticulations, ground-glass opacities unchanged from 10/2020      HRCT 08/05/2022-mild subpleural reticulation/GGOs at the lateral RML, inferior lingula, basal lobes    No change from last imaging     Pulmonary function testing:      PFT 01/27/2021-ratio of 82%, FEV1 1 33 L/67%, FVC 1 63 L/65%, no significant response to bronchodilator   TLC reduced at 64%, RV 59%   DLCO 69% of predicted     PFT 09/08/2021-ratio 80%, FEV1 1 41 L/72%, FVC 1 76 L/71%   TLC 63%, RV 57%, DLCO 66%   Positive BD response at the level of small airways     PFT 06/22/2022-ratio 76%, FEV1 1 36 L/72%, FVC 1 77 L/75%  TLC 91%, %  DLCO 72%      6 minutes walk test 01/27/2021-baseline 96% on room air, heart rate 61  Able to walk 420 m in 6 minutes, lowest SpO2 92% on room air, maximal heart rate 101     6 minute walk test 09/08/2021-Baseline SpO2 90% on room air heart rate 69   Able to walk 6 minutes, lowest SpO2 of 88% at the end of exercise, maximal heart rate 104   Noted a significant oxygen desaturation with exertion     6 minute walk test 11/24/2021-baseline SpO2 96 percent on room air, heart rate 65   Able to walk 336 meters in 6 minutes, lowest SpO2 92 percent   Maximal heart rate 94     6 minute walk test 06/22/2022-baseline SpO2 95% on room air  Heart rate 90  Able to walk 365 m in 6 minutes without stop  Lowest SpO2 at 93%, maximal heart rate 120      EKG, Pathology, and Other Studies: I have personally reviewed pertinent reports        TTE 05/15/2019-EF 63%   Mild LV hypertrophy   Pulmonary artery systolic pressure estimated to be normal, normal size   TAPSE 2 51     TTE 03/31/2021-EF 65%, no WMA, mild increase wall thickness/eccentric hypertrophy, mildly dilated LA with septum moved very mildly bowing from the left to the right suggesting increased LA pressure trace regurgitation at the mitral valve   Estimated PA SP of 22 mm mercury   Very small/physiologic pericardial effusion   TAPSE 3 8     TTE 05/83/8527-PATISI LV size, systolic function   Diastolic function mildly abnormal, grade 1 diastolic dysfunction   Try vial pericardial effusion posterior to the heart   Normal RA/LA size   Normal RV size and function   Estimated PA SP normal at 26 millimeter Hg    TTE 08/05/2022-normal LV size, mild increase wall thickness    EF 70%, vigorous systolic function  Normal wall motion, normal diastolic function  RV SP 19 mm Hg      Serology   Normal the following:  Anca, CCP/RF, CK/aldolase, BRENDA/11 biomarkers  Past medical, surgical, social and family histories reviewed  Medications/Allergies: Reviewed      Vitals: Blood pressure 108/70, pulse 73, temperature (!) 97 4 °F (36 3 °C), temperature source Tympanic, height 5' 2" (1 575 m), weight 103 kg (228 lb), SpO2 96 %, not currently breastfeeding  Body mass index is 41 7 kg/m²  Oxygen Therapy  SpO2: 96 %  Oxygen Therapy: None (Room air)      Physical Exam  Body mass index is 41 7 kg/m²  Gen: not in acute distress, obese  Neck/Eyes: supple, no adenopathy, PERRL  Ear: normal appearance, no significant hearing impairment  Nose:  normal nasal mucosa, no drainage  Mouth:  unremarkable/normal appearance of lips, teeth and gums  Oropharynx: mucosa is moist, no focal lesions or erythema  Salivary glands: soft nontender  Chest: normal respiratory efforts, clear breath sounds bilaterally  CV: RRR, no murmurs appreciated, no edema  Abdomen: soft, non tender  Extremities:  No observed deformity, no digital clubbing   Skin: unremarkable  Neuro: AAO X3, no focal motor deficit        Labs:  Lab Results   Component Value Date    WBC 7 70 07/11/2022    HGB 14 0 07/11/2022    HCT 42 6 07/11/2022    MCV 80 (L) 07/11/2022     07/11/2022     Lab Results   Component Value Date    CALCIUM 8 9 07/11/2022    K 4 0 07/11/2022    CO2 30 07/11/2022     07/11/2022    BUN 22 07/11/2022    CREATININE 0 64 07/11/2022     No results found for: IGE  Lab Results   Component Value Date    ALT 17 02/11/2022    AST 25 02/11/2022    ALKPHOS 68 02/11/2022           Portions of the record may have been created with voice recognition software  Occasional wrong word or "sound a like" substitutions may have occurred due to the inherent limitations of voice recognition software    Read the chart carefully and recognize, using context, where substitutions have occurred    ELVA Zamudio [facility]

## 2022-11-28 DIAGNOSIS — I10 HYPERTENSION, UNSPECIFIED TYPE: ICD-10-CM

## 2022-11-29 RX ORDER — HYDROCHLOROTHIAZIDE 25 MG/1
25 TABLET ORAL EVERY MORNING
Qty: 90 TABLET | Refills: 3 | Status: SHIPPED | OUTPATIENT
Start: 2022-11-29

## 2022-12-13 ENCOUNTER — OFFICE VISIT (OUTPATIENT)
Dept: FAMILY MEDICINE CLINIC | Facility: CLINIC | Age: 67
End: 2022-12-13

## 2022-12-13 VITALS
BODY MASS INDEX: 42.55 KG/M2 | OXYGEN SATURATION: 95 % | WEIGHT: 231.2 LBS | HEIGHT: 62 IN | SYSTOLIC BLOOD PRESSURE: 130 MMHG | RESPIRATION RATE: 20 BRPM | DIASTOLIC BLOOD PRESSURE: 72 MMHG | TEMPERATURE: 97.6 F | HEART RATE: 77 BPM

## 2022-12-13 DIAGNOSIS — K29.60 OTHER GASTRITIS WITHOUT HEMORRHAGE, UNSPECIFIED CHRONICITY: ICD-10-CM

## 2022-12-13 DIAGNOSIS — J45.909 MODERATE ASTHMA, UNSPECIFIED WHETHER COMPLICATED, UNSPECIFIED WHETHER PERSISTENT: ICD-10-CM

## 2022-12-13 DIAGNOSIS — Z23 NEED FOR VACCINATION AGAINST STREPTOCOCCUS PNEUMONIAE: ICD-10-CM

## 2022-12-13 DIAGNOSIS — G89.29 CHRONIC BILATERAL LOW BACK PAIN WITHOUT SCIATICA: ICD-10-CM

## 2022-12-13 DIAGNOSIS — E66.01 MORBID OBESITY WITH BMI OF 40.0-44.9, ADULT (HCC): ICD-10-CM

## 2022-12-13 DIAGNOSIS — M54.50 CHRONIC BILATERAL LOW BACK PAIN WITHOUT SCIATICA: ICD-10-CM

## 2022-12-13 DIAGNOSIS — E03.9 HYPOTHYROIDISM (ACQUIRED): ICD-10-CM

## 2022-12-13 DIAGNOSIS — I28.8 ENLARGED PULMONARY ARTERY (HCC): ICD-10-CM

## 2022-12-13 DIAGNOSIS — N39.41 URGE INCONTINENCE OF URINE: ICD-10-CM

## 2022-12-13 DIAGNOSIS — Z12.31 SCREENING MAMMOGRAM FOR BREAST CANCER: ICD-10-CM

## 2022-12-13 DIAGNOSIS — R73.03 PREDIABETES: ICD-10-CM

## 2022-12-13 DIAGNOSIS — Z00.00 ANNUAL PHYSICAL EXAM: Primary | ICD-10-CM

## 2022-12-13 DIAGNOSIS — Z23 NEED FOR DIPHTHERIA-TETANUS-PERTUSSIS (TDAP) VACCINE: ICD-10-CM

## 2022-12-13 DIAGNOSIS — I10 HYPERTENSION, UNSPECIFIED TYPE: ICD-10-CM

## 2022-12-13 PROBLEM — M85.88 OSTEOPENIA OF LUMBAR SPINE: Status: ACTIVE | Noted: 2022-12-13

## 2022-12-13 LAB — SL AMB POCT HEMOGLOBIN AIC: 6.5 (ref ?–6.5)

## 2022-12-13 RX ORDER — LEVOTHYROXINE SODIUM 0.05 MG/1
50 TABLET ORAL DAILY
Qty: 90 TABLET | Refills: 3 | Status: SHIPPED | OUTPATIENT
Start: 2022-12-13

## 2022-12-13 RX ORDER — OLMESARTAN MEDOXOMIL 40 MG/1
40 TABLET ORAL DAILY
Qty: 90 TABLET | Refills: 3 | Status: SHIPPED | OUTPATIENT
Start: 2022-12-13

## 2022-12-13 RX ORDER — ROSUVASTATIN CALCIUM 5 MG/1
5 TABLET, COATED ORAL DAILY
Qty: 90 TABLET | Refills: 3 | Status: SHIPPED | OUTPATIENT
Start: 2022-12-13

## 2022-12-13 RX ORDER — FLUTICASONE PROPIONATE AND SALMETEROL 100; 50 UG/1; UG/1
1 POWDER RESPIRATORY (INHALATION) 2 TIMES DAILY
Qty: 60 BLISTER | Refills: 1 | Status: SHIPPED | OUTPATIENT
Start: 2022-12-13

## 2022-12-13 RX ORDER — PANTOPRAZOLE SODIUM 40 MG/1
40 TABLET, DELAYED RELEASE ORAL DAILY
Qty: 30 TABLET | Refills: 2 | Status: SHIPPED | OUTPATIENT
Start: 2022-12-13

## 2022-12-13 RX ORDER — LIDOCAINE 50 MG/G
OINTMENT TOPICAL AS NEEDED
Qty: 240 G | Refills: 2 | Status: SHIPPED | OUTPATIENT
Start: 2022-12-13

## 2022-12-13 NOTE — ASSESSMENT & PLAN NOTE
Previous on [employer] daily  Followed by pulmonology  Recent exacerbation due to COVID  Improved with steroid burst x 5 days but still with residual wheezing at night  Reviewed last pulm consult note in 10/2022  Restart Advair daily  Patient hesitant to be on daily inhaler  Continue albuterol as needed

## 2022-12-13 NOTE — PROGRESS NOTES
ADULT ANNUAL PHYSICAL   [facility] PRIMARY CARE [facility]    NAME: Wanda Schaffer  AGE: 78 y o  SEX: female  : 1955     DATE: 2022     Assessment and Plan:     Problem List Items Addressed This Visit        Digestive    Gastritis without bleeding     Continue PPI as needed  If requiring daily use, recommend GI for possible EGD  Relevant Medications    pantoprazole (PROTONIX) 40 mg tablet       Endocrine    Hypothyroidism (acquired)     Lab Results   Component Value Date    [id number] 1 810 2022     Stable on levothyroxine, takes appropriately  Continue same dose and repeat level  Relevant Medications    levothyroxine 50 mcg tablet    Other Relevant Orders    TSH, 3rd generation with Free T4 reflex       Respiratory    Asthma     Previous on Wixela daily  Followed by pulmonology  Recent exacerbation due to COVID  Improved with steroid burst x 5 days but still with residual wheezing at night  Reviewed last pulm consult note in 10/2022  Restart Advair daily  Patient hesitant to be on daily inhaler  Continue albuterol as needed  Relevant Medications    Fluticasone-Salmeterol (Advair Diskus) 100-50 mcg/dose inhaler       Cardiovascular and Mediastinum    Hypertension    Relevant Medications    olmesartan (BENICAR) 40 mg tablet    rosuvastatin (CRESTOR) 5 mg tablet    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[address] )     Reviewed last echo in 2022  The ascending aorta is 3 4 cm  No signs of pulmonary HTN  Continue with BP control  Other    Prediabetes     Lab Results   Component Value Date    HGBA1C 6 5 2022     Last fasting glucose 122 in 2022  Repeat fasting glucose, if >126 will give formal diagnosis of diabetes  Discussed risk of progression  Discussed importance of low carb diet and exercise  Patient reports poor compliance with diet in past few months  Recent prednisone for COVID as well           Relevant Orders POCT hemoglobin A1c (Completed)    Comprehensive metabolic panel    Lipid panel    CBC and differential    Morbid obesity with BMI of 40 0-44 9, adult ([facility])     BMI Counseling: Body mass index is 42 29 kg/m²  The BMI is above normal  Nutrition recommendations include reducing portion sizes, decreasing overall calorie intake and 3-5 servings of fruits/vegetables daily  Exercise recommendations include exercising 3-5 times per week  Upcoming bariatrics appointment scheduled for 12/22  Follow-up with weight management for weight loss  Chronic bilateral low back pain without sciatica     Suspect muscular x several years  Previously using lidocaine cream from coworker with improvement  Works at [employer]  Recommend stretching/exercise and weight loss  Start lidocaine cream as needed  Relevant Medications    lidocaine (XYLOCAINE) 5 % ointment   Other Visit Diagnoses     Annual physical exam    -  Primary    Relevant Orders    Ambulatory Referral to Gynecology    Urge incontinence of urine        Relevant Orders    Ambulatory Referral to Gynecology    Screening mammogram for breast cancer        Relevant Orders    Mammo screening bilateral w 3d & cad    Need for vaccination against Streptococcus pneumoniae        Relevant Orders    Pneumococcal Conjugate Vaccine 20-valent (Pcv20) (Completed)    Need for diphtheria-tetanus-pertussis (Tdap) vaccine        Relevant Orders    TDAP VACCINE GREATER THAN OR EQUAL TO 8YO IM (Completed)          Immunizations and preventive care screenings were discussed with patient today  Appropriate education was printed on patient's after visit summary  Counseling:  Alcohol/drug use: discussed moderation in alcohol intake, the recommendations for healthy alcohol use, and avoidance of illicit drug use  Dental Health: discussed importance of regular tooth brushing, flossing, and dental visits    Injury prevention: discussed safety/seat belts, safety helmets, smoke detectors, carbon dioxide detectors, and smoking near bedding or upholstery  · Exercise: the importance of regular exercise/physical activity was discussed  Recommend exercise 3-5 times per week for at least 30 minutes  Return in about 3 months (around 3/13/2023)  Chief Complaint:     Chief Complaint   Patient presents with   • Physical Exam      History of Present Illness:     Adult Annual Physical   Patient here for a comprehensive physical exam  The patient reports no problems  She reports that she wears a incontinence pad just in case as she had triplets and leaks x years  She is aware when she leaks as she has urgency associated  She denies leakage with coughing for sneezing  She recovered from [place] but still has residual cough and tightness in chest  She has been using her ventolin 3x a week  She was previously on Advair but stopped due to not feeling like she needed it  She was on metformin in the past for prediabetes but it made her sugars drop  She has been very noncompliant with low carb diet in the past several months  Drinks wine with dinner occasionally  Diet and Physical Activity  · Diet/Nutrition: poor diet  · Exercise: walking  Depression Screening  PHQ-2/9 Depression Screening    Little interest or pleasure in doing things: 0 - not at all  Feeling down, depressed, or hopeless: 0 - not at all  PHQ-2 Score: 0  PHQ-2 Interpretation: Negative depression screen       General Health  · Sleep: sleeps well and gets 4-6 hours of sleep on average  · Hearing: normal - bilateral   · Vision: wears glasses  · Dental: regular dental visits  /GYN Health  · Patient is: postmenopausal  · Last menstrual period: hysterectomy   · Contraceptive method: none  Review of Systems:     Review of Systems   Constitutional: Negative for chills and fever  HENT: Negative for ear pain and sore throat  Eyes: Negative for pain and visual disturbance  Respiratory: Positive for cough  Negative for shortness of breath  Cardiovascular: Negative for chest pain and palpitations  Gastrointestinal: Negative for abdominal pain and vomiting  Genitourinary: Positive for urgency  Negative for dysuria, hematuria and menstrual problem  Musculoskeletal: Positive for back pain and neck pain  Negative for arthralgias  Skin: Negative for color change and rash  Neurological: Negative for seizures and syncope  All other systems reviewed and are negative  Past Medical History:     Past Medical History:   Diagnosis Date   • Asthma    • Colon polyp    • Disease of thyroid gland    • GERD (gastroesophageal reflux disease)    • Hyperlipidemia    • Hypertension    • Obesity    • Pre-diabetes    • Seasonal allergies    • Thyroid disease    • Tinnitus       Past Surgical History:     Past Surgical History:   Procedure Laterality Date   •  SECTION      Triplets   • COLONOSCOPY     • COLONOSCOPY W/ POLYPECTOMY  2020   • HYSTERECTOMY      age 51   • REDUCTION MAMMAPLASTY        Social History:     Social History     Socioeconomic History   • Marital status: /Civil Union     Spouse name: None   • Number of children: None   • Years of education: None   • Highest education level: None   Occupational History   • None   Tobacco Use   • Smoking status: Former     Packs/day: 0 50     Years: 38 00     Pack years: 19 00     Types: Cigarettes     Start date: 0     Quit date: 3/18/2010     Years since quittin 7   • Smokeless tobacco: Never   Vaping Use   • Vaping Use: Never used   Substance and Sexual Activity   • Alcohol use:  Yes     Alcohol/week: 4 0 standard drinks     Types: 4 Glasses of wine per week     Comment: social   • Drug use: No   • Sexual activity: Not Currently     Birth control/protection: None   Other Topics Concern   • None   Social History Narrative   • None     Social Determinants of Health     Financial Resource Strain: Not on file   Food Insecurity: Not on file Transportation Needs: Not on file   Physical Activity: Not on file   Stress: Not on file   Social Connections: Not on file   Intimate Partner Violence: Not on file   Housing Stability: Not on file      Family History:     Family History   Problem Relation Age of Onset   • Coronary artery disease Maternal Grandfather    • Breast cancer Maternal Aunt 62   • Stroke Mother    • No Known Problems Father    • No Known Problems Sister    • No Known Problems Daughter    • No Known Problems Maternal Grandmother    • No Known Problems Paternal Grandmother    • No Known Problems Paternal Grandfather    • No Known Problems Daughter    • No Known Problems Daughter    • No Known Problems Sister    • No Known Problems Sister    • No Known Problems Sister    • No Known Problems Sister    • No Known Problems Sister    • No Known Problems Sister    • No Known Problems Maternal Aunt    • No Known Problems Maternal Aunt    • No Known Problems Maternal Aunt    • No Known Problems Maternal Aunt    • No Known Problems Maternal Aunt    • No Known Problems Paternal Aunt    • No Known Problems Paternal Aunt       Current Medications:     Current Outpatient Medications   Medication Sig Dispense Refill   • albuterol (2 5 mg/3 mL) 0 083 % nebulizer solution USE 1 AMPULE VIA NEBULIZER EVERY 6 HOURS AS NEEDED FOR WHEEZING OR SHORTNESS OF BREATH 300 mL 0   • calcium carbonate-vitamin D (OSCAL-D) 500 mg-200 units per tablet Take 1 tablet by mouth daily with breakfast 60 tablet 5   • cholecalciferol (VITAMIN D3) 1,000 units tablet Take 1 tablet (1,000 Units total) by mouth daily 30 tablet 5   • fluticasone (FLONASE) 50 mcg/act nasal spray 2 sprays into each nostril daily as needed for rhinitis 16 g 1   • Fluticasone-Salmeterol (Advair Diskus) 100-50 mcg/dose inhaler Inhale 1 puff 2 (two) times a day Rinse mouth after use  60 blister 1   • glucose blood test strip Checks blood glucose 3 times a day   100 each 3   • hydrochlorothiazide (HYDRODIURIL) 25 mg tablet Take 1 tablet (25 mg total) by mouth every morning 90 tablet 3   • hydrocortisone 2 5 % cream Apply topically 4 (four) times a day as needed for irritation 30 g 0   • Lancets (OneTouch Delica Plus [id number]) MISC USE TO CHECK BLOOD SUGAR 3 TIMES A  each 0   • levothyroxine 50 mcg tablet Take 1 tablet (50 mcg total) by mouth daily 90 tablet 3   • lidocaine (XYLOCAINE) 5 % ointment Apply topically as needed for mild pain 240 g 2   • Multiple Vitamins-Minerals (MULTIVITAMIN ADULT PO) 1 tablet every 24 hours     • olmesartan (BENICAR) 40 mg tablet Take 1 tablet (40 mg total) by mouth daily 90 tablet 3   • pantoprazole (PROTONIX) 40 mg tablet Take 1 tablet (40 mg total) by mouth daily 30 tablet 2   • rosuvastatin (CRESTOR) 5 mg tablet Take 1 tablet (5 mg total) by mouth daily 90 tablet 3   • Ventolin  (90 Base) MCG/ACT inhaler Inhale 2 puffs 4 (four) times a day 18 g 5     No current facility-administered medications for this visit  Allergies:     No Known Allergies   Physical Exam:     /72 (BP Location: Left arm, Patient Position: Sitting, Cuff Size: Large)   Pulse 77   Temp 97 6 °F (36 4 °C) (Tympanic)   Resp 20   Ht 5' 2" (1 575 m)   Wt 105 kg (231 lb 3 2 oz)   SpO2 95%   BMI 42 29 kg/m²     Physical Exam  Vitals and nursing note reviewed  Constitutional:       General: She is not in acute distress  Appearance: She is well-developed  She is obese  HENT:      Head: Normocephalic and atraumatic  Right Ear: Tympanic membrane, ear canal and external ear normal       Left Ear: Tympanic membrane, ear canal and external ear normal       Mouth/Throat:      Mouth: Mucous membranes are moist    Eyes:      Extraocular Movements: Extraocular movements intact  Conjunctiva/sclera: Conjunctivae normal       Pupils: Pupils are equal, round, and reactive to light  Cardiovascular:      Rate and Rhythm: Normal rate and regular rhythm        Heart sounds: No murmur heard   Pulmonary:      Effort: Pulmonary effort is normal  No respiratory distress  Breath sounds: Normal breath sounds  No wheezing  Abdominal:      Palpations: Abdomen is soft  Tenderness: There is no abdominal tenderness  Musculoskeletal:         General: No swelling  Cervical back: Neck supple  Skin:     General: Skin is warm and dry  Capillary Refill: Capillary refill takes less than 2 seconds  Neurological:      Mental Status: She is alert     Psychiatric:         Mood and Affect: Mood normal           Ml Bradley PA-C  [address]

## 2022-12-14 PROBLEM — E07.9 DISORDER OF THYROID: Status: RESOLVED | Noted: 2022-10-14 | Resolved: 2022-12-14

## 2022-12-14 PROBLEM — J41.0 SIMPLE CHRONIC BRONCHITIS (HCC): Status: RESOLVED | Noted: 2021-07-02 | Resolved: 2022-12-14

## 2022-12-14 PROBLEM — G89.29 CHRONIC BILATERAL LOW BACK PAIN WITHOUT SCIATICA: Status: ACTIVE | Noted: 2022-12-14

## 2022-12-14 PROBLEM — M54.50 CHRONIC BILATERAL LOW BACK PAIN WITHOUT SCIATICA: Status: ACTIVE | Noted: 2022-12-14

## 2022-12-14 NOTE — ASSESSMENT & PLAN NOTE
Suspect muscular x several years  Previously using lidocaine cream from coworker with improvement  Works at [employer]  Recommend stretching/exercise and weight loss  Start lidocaine cream as needed

## 2022-12-14 NOTE — ASSESSMENT & PLAN NOTE
Lab Results   Component Value Date    HGBA1C 6 5 12/13/2022     Last fasting glucose 122 in 7/2022  Repeat fasting glucose, if >126 will give formal diagnosis of diabetes  Discussed risk of progression  Discussed importance of low carb diet and exercise  Patient reports poor compliance with diet in past few months  Recent prednisone for COVID as well

## 2022-12-14 NOTE — ASSESSMENT & PLAN NOTE
Reviewed last echo in 8/2022  The ascending aorta is 3 4 cm  No signs of pulmonary HTN  Continue with BP control

## 2022-12-14 NOTE — ASSESSMENT & PLAN NOTE
BMI Counseling: Body mass index is 42 29 kg/m²  The BMI is above normal  Nutrition recommendations include reducing portion sizes, decreasing overall calorie intake and 3-5 servings of fruits/vegetables daily  Exercise recommendations include exercising 3-5 times per week  Upcoming bariatrics appointment scheduled for 12/22  Follow-up with weight management for weight loss

## 2022-12-14 NOTE — ASSESSMENT & PLAN NOTE
Lab Results   Component Value Date    [id number] 1 810 07/11/2022     Stable on levothyroxine, takes appropriately  Continue same dose and repeat level

## 2022-12-22 ENCOUNTER — CONSULT (OUTPATIENT)
Dept: BARIATRICS | Facility: CLINIC | Age: 67
End: 2022-12-22

## 2022-12-22 VITALS
OXYGEN SATURATION: 98 % | HEIGHT: 62 IN | BODY MASS INDEX: 41 KG/M2 | WEIGHT: 222.8 LBS | HEART RATE: 74 BPM | SYSTOLIC BLOOD PRESSURE: 128 MMHG | DIASTOLIC BLOOD PRESSURE: 72 MMHG | RESPIRATION RATE: 18 BRPM

## 2022-12-22 DIAGNOSIS — E66.01 MORBID OBESITY (HCC): ICD-10-CM

## 2022-12-22 DIAGNOSIS — I10 HYPERTENSION, UNSPECIFIED TYPE: ICD-10-CM

## 2022-12-22 DIAGNOSIS — E66.01 MORBID OBESITY WITH BMI OF 40.0-44.9, ADULT (HCC): Primary | ICD-10-CM

## 2022-12-22 DIAGNOSIS — R53.83 FATIGUE: ICD-10-CM

## 2022-12-22 DIAGNOSIS — Z91.89 RISK FACTORS FOR OBSTRUCTIVE SLEEP APNEA: ICD-10-CM

## 2022-12-22 DIAGNOSIS — E78.2 MIXED HYPERLIPIDEMIA: ICD-10-CM

## 2022-12-22 NOTE — ASSESSMENT & PLAN NOTE
-Discussed options of [facility]-Intensive Lifestyle Intervention Program, Very Low Calorie Diet-VLCD, Conservative Program, Rene-En-Y Gastric Bypass and Vertical Sleeve Gastrectomy and the role of weight loss medications  Explained the importance of making lifestyle changes first before starting anti-obesity medications   -Initial weight loss goal of 5-10% weight loss for improved health  -Screening labs  Recommend checking lab coverage before having labs drawn  -NEEDS LABS  -Labs reviewed from 7/11/22 and A1C from 12/13/22 all within acceptable limits except glucose/a1c of 6 5  - STOP BANG-3/8-referred to sleep medicine due to symptoms    -Patient is interested in pursuing healthycore  Discussed avoiding skipping meals  Can try returning back to oatmeal for breakfast or protein shake  -also discussed trying to restart exercise   She will investigate gym in [address] [place] [address]   -to increase water intake with a goal or 64 oz daily

## 2022-12-22 NOTE — PROGRESS NOTES
Assessment/Plan: Morbid obesity with BMI of 40 0-44 9, adult ([facility] )  -Discussed options of HealthyCORE-Intensive Lifestyle Intervention Program, Very Low Calorie Diet-VLCD, Conservative Program, Rene-En-Y Gastric Bypass and Vertical Sleeve Gastrectomy and the role of weight loss medications  Explained the importance of making lifestyle changes first before starting anti-obesity medications   -Initial weight loss goal of 5-10% weight loss for improved health  -Screening labs  Recommend checking lab coverage before having labs drawn  -NEEDS LABS  -Labs reviewed from 7/11/22 and A1C from 12/13/22 all within acceptable limits except glucose/a1c of 6 5  - STOP BANG-3/8-referred to sleep medicine due to symptoms    -Patient is interested in pursuing healthycore  Discussed avoiding skipping meals  Can try returning back to oatmeal for breakfast or protein shake  -also discussed trying to restart exercise  She will investigate gym in [address] [place] [address] Dr  -to increase water intake with a goal or 64 oz daily        Mixed hyperlipidemia  On crestor  -should improve with weight loss, dietary, and lifestyle changes      Hypertension  On benicar and hctz  -should improve with weight loss, dietary, and lifestyle changes        Follow up in approximately 1 month with Non-Surgical Dietician to start healthycore      Diagnoses and all orders for this visit:    Morbid obesity with BMI of 40 0-44 9, adult [facility])  -     Ambulatory Referral to Sleep Medicine; Future    Morbid obesity ([facility] )  -     Ambulatory Referral to Weight Management    Fatigue  -     Ambulatory Referral to Sleep Medicine; Future    Risk factors for obstructive sleep apnea  -     Ambulatory Referral to Sleep Medicine;  Future    Mixed hyperlipidemia    Hypertension, unspecified type         I have spent 40 minutes with Patient  today in which greater than 50% of this time was spent in counseling/coordination of care regarding Diagnostic results, Risks and benefits of tx options, Intructions for management, Patient and family education, Risk factor reductions and Impressions  Subjective:   Chief Complaint   Patient presents with   • Consult     MWM goal wt 180, waist 46 5, s/b 3-8 neg       Patient ID: Ирина Roblero  is a 78 y o  female with excess weight/obesity here to pursue weight management  Past Medical History:   Diagnosis Date   • Asthma    • Colon polyp    • Disease of thyroid gland    • GERD (gastroesophageal reflux disease)    • Hyperlipidemia    • Hypertension    • Obesity    • Pre-diabetes    • Seasonal allergies    • Thyroid disease    • Tinnitus        HPI: Here for MWM consult  She has tried dietary changes and increased exercise prior to help with weight loss  Currently has been busy at work and not eating properly  She does not eat often and will skip meals   Eats more fish at home, but has had some diet changes over last few months and was on vacation  She usually whole grain carbs but has switched more to white rice  Obesity/Excess Weight:  Severity: class III  Onset:  years   Modifiers: Diet and Exercise   Has walked before but has mensicus tear  Contributing factors: Poor Food Choices and eating late in the day  Associated symptoms: comorbid conditions and fatigue    Goals:180 lb  Hydration:varies-likes it but cant drink much at work, 2 coffee a day creamer, tea sometimes lemon or honey  Alcohol: 1-2 drinks a week  Exercise:nothing formal  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  Dining out: rare    Diet Recall:  B: not typically , sometimes a fruit or oatmeal  S: rare at work  D: prefers fish but varies   Colonoscopy-Completed    The following portions of the patient's history were reviewed and updated as appropriate: She  has a past medical history of Asthma, Colon polyp, Disease of thyroid gland, GERD (gastroesophageal reflux disease), Hyperlipidemia, Hypertension, Obesity, Pre-diabetes, Seasonal allergies, Thyroid disease, and Tinnitus  She   Patient Active Problem List    Diagnosis Date Noted   • Chronic bilateral low back pain without sciatica 2022   • Osteopenia of lumbar spine 2022   • Varicose veins of lower extremity 10/14/2022   • Ingrown toenail 10/14/2022   • Heel pain 2022   • Kidney congenitally absent, left 2021   • Restrictive lung disease 10/27/2021   • 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[facility] ) 10/27/2021   • Interstitial lung disease ([facility] ) 2021   • Abnormal CT scan of lung 2021   • Atopic dermatitis 2021   • Asthma 10/16/2020   • Gastritis without bleeding 10/16/2020   • Seasonal allergic rhinitis 2019   • Hypothyroidism (acquired) 2019   • Mixed hyperlipidemia 2019   • Morbid obesity with BMI of 40 0-44 9, adult ([facility] ) 11/15/2018   • Hypertension 10/18/2018   • Prediabetes 10/18/2018     She  has a past surgical history that includes  section; Colonoscopy; Colonoscopy w/ polypectomy (2020); Hysterectomy; and Reduction mammaplasty ()  Her family history includes Breast cancer (age of onset: 62) in her maternal aunt; Coronary artery disease in her maternal grandfather; No Known Problems in her daughter, daughter, daughter, father, maternal aunt, maternal aunt, maternal aunt, maternal aunt, maternal aunt, maternal grandmother, paternal aunt, paternal aunt, paternal grandfather, paternal grandmother, sister, sister, sister, sister, sister, sister, and sister; Stroke in her mother  She  reports that she quit smoking about 12 years ago  Her smoking use included cigarettes  She started smoking about 51 years ago  She has a 19 00 pack-year smoking history  She has never used smokeless tobacco  She reports current alcohol use of about 4 0 standard drinks per week  She reports that she does not use drugs    Current Outpatient Medications   Medication Sig Dispense Refill   • albuterol (2 5 mg/3 mL) 0 083 % nebulizer solution USE 1 AMPULE VIA NEBULIZER EVERY 6 HOURS AS NEEDED FOR WHEEZING OR SHORTNESS OF BREATH 300 mL 0   • calcium carbonate-vitamin D (OSCAL-D) 500 mg-200 units per tablet Take 1 tablet by mouth daily with breakfast 60 tablet 5   • cholecalciferol (VITAMIN D3) 1,000 units tablet Take 1 tablet (1,000 Units total) by mouth daily 30 tablet 5   • fluticasone (FLONASE) 50 mcg/act nasal spray 2 sprays into each nostril daily as needed for rhinitis 16 g 1   • Fluticasone-Salmeterol (Advair Diskus) 100-50 mcg/dose inhaler Inhale 1 puff 2 (two) times a day Rinse mouth after use  60 blister 1   • glucose blood test strip Checks blood glucose 3 times a day  100 each 3   • hydrochlorothiazide (HYDRODIURIL) 25 mg tablet Take 1 tablet (25 mg total) by mouth every morning 90 tablet 3   • hydrocortisone 2 5 % cream Apply topically 4 (four) times a day as needed for irritation 30 g 0   • Lancets (OneTouch Delica Plus [id number]) MISC USE TO CHECK BLOOD SUGAR 3 TIMES A  each 0   • levothyroxine 50 mcg tablet Take 1 tablet (50 mcg total) by mouth daily 90 tablet 3   • lidocaine (XYLOCAINE) 5 % ointment Apply topically as needed for mild pain 240 g 2   • Multiple Vitamins-Minerals (MULTIVITAMIN ADULT PO) 1 tablet every 24 hours     • olmesartan (BENICAR) 40 mg tablet Take 1 tablet (40 mg total) by mouth daily 90 tablet 3   • pantoprazole (PROTONIX) 40 mg tablet Take 1 tablet (40 mg total) by mouth daily 30 tablet 2   • rosuvastatin (CRESTOR) 5 mg tablet Take 1 tablet (5 mg total) by mouth daily 90 tablet 3   • Ventolin  (90 Base) MCG/ACT inhaler Inhale 2 puffs 4 (four) times a day 18 g 5     No current facility-administered medications for this visit       Current Outpatient Medications on File Prior to Visit   Medication Sig   • albuterol (2 5 mg/3 mL) 0 083 % nebulizer solution USE 1 AMPULE VIA NEBULIZER EVERY 6 HOURS AS NEEDED FOR WHEEZING OR SHORTNESS OF BREATH   • calcium carbonate-vitamin D (OSCAL-D) 500 mg-200 units per tablet Take 1 tablet by mouth daily with breakfast   • cholecalciferol (VITAMIN D3) 1,000 units tablet Take 1 tablet (1,000 Units total) by mouth daily   • fluticasone (FLONASE) 50 mcg/act nasal spray 2 sprays into each nostril daily as needed for rhinitis   • Fluticasone-Salmeterol (Advair Diskus) 100-50 mcg/dose inhaler Inhale 1 puff 2 (two) times a day Rinse mouth after use  • glucose blood test strip Checks blood glucose 3 times a day  • hydrochlorothiazide (HYDRODIURIL) 25 mg tablet Take 1 tablet (25 mg total) by mouth every morning   • hydrocortisone 2 5 % cream Apply topically 4 (four) times a day as needed for irritation   • Lancets (OneTouch Delica Plus [id number]) MISC USE TO CHECK BLOOD SUGAR 3 TIMES A DAY   • levothyroxine 50 mcg tablet Take 1 tablet (50 mcg total) by mouth daily   • lidocaine (XYLOCAINE) 5 % ointment Apply topically as needed for mild pain   • Multiple Vitamins-Minerals (MULTIVITAMIN ADULT PO) 1 tablet every 24 hours   • olmesartan (BENICAR) 40 mg tablet Take 1 tablet (40 mg total) by mouth daily   • pantoprazole (PROTONIX) 40 mg tablet Take 1 tablet (40 mg total) by mouth daily   • rosuvastatin (CRESTOR) 5 mg tablet Take 1 tablet (5 mg total) by mouth daily   • Ventolin  (90 Base) MCG/ACT inhaler Inhale 2 puffs 4 (four) times a day     No current facility-administered medications on file prior to visit  She has No Known Allergies       Review of Systems   Constitutional: Positive for fatigue  Negative for chills and fever  Respiratory: Negative for shortness of breath  Cardiovascular: Negative for chest pain and palpitations  Gastrointestinal: Negative for abdominal pain, constipation, diarrhea and vomiting  Genitourinary: Negative for difficulty urinating  Musculoskeletal: Positive for arthralgias  Negative for back pain (knee sometimes)  Skin: Negative for rash  Neurological: Negative for headaches  Psychiatric/Behavioral: Negative for dysphoric mood  The patient is not nervous/anxious  Objective:    /72   Pulse 74   Resp 18   Ht 5' 2" (1 575 m)   Wt 101 kg (222 lb 12 8 oz)   SpO2 98%   BMI 40 75 kg/m²   Waist 46 5  Physical Exam  Vitals and nursing note reviewed  Constitutional:       General: She is not in acute distress  Appearance: She is well-developed  She is obese  HENT:      Head: Normocephalic and atraumatic  Eyes:      Conjunctiva/sclera: Conjunctivae normal    Neck:      Thyroid: No thyromegaly  Pulmonary:      Effort: Pulmonary effort is normal  No respiratory distress  Skin:     Findings: No rash (visible)  Neurological:      Mental Status: She is alert and oriented to person, place, and time     Psychiatric:         Mood and Affect: Mood normal          Behavior: Behavior normal

## 2022-12-28 ENCOUNTER — OFFICE VISIT (OUTPATIENT)
Dept: OBGYN CLINIC | Facility: CLINIC | Age: 67
End: 2022-12-28

## 2022-12-28 VITALS
SYSTOLIC BLOOD PRESSURE: 151 MMHG | HEART RATE: 89 BPM | WEIGHT: 229 LBS | DIASTOLIC BLOOD PRESSURE: 80 MMHG | BODY MASS INDEX: 42.14 KG/M2 | HEIGHT: 62 IN

## 2022-12-28 DIAGNOSIS — Z11.3 SCREEN FOR STD (SEXUALLY TRANSMITTED DISEASE): ICD-10-CM

## 2022-12-28 DIAGNOSIS — Z01.419 ROUTINE GYNECOLOGICAL EXAMINATION: Primary | ICD-10-CM

## 2022-12-28 NOTE — PATIENT INSTRUCTIONS
Thank you for your confidence in our team    We appreciate you and welcome your feedback  If you receive a survey from us, please take a few moments to let us know how we are doing     Sincerely,  Lashell Meza, DO

## 2022-12-28 NOTE — PROGRESS NOTES
Helene Maxwell is a 78 y o  female who presents today for annual GYN exam   She reports no history of abnormal pap smears in her past   Her last mammogram was performed 22 and result was negative  She had colon cancer screening performed 2020  No LMP recorded  Patient has had a hysterectomy  Her general medical history has been reviewed and she reports it as follows:    Past Medical History:   Diagnosis Date   • Asthma    • Colon polyp    • Disease of thyroid gland    • GERD (gastroesophageal reflux disease)    • Hyperlipidemia    • Hypertension    • Obesity    • Pre-diabetes    • Seasonal allergies    • Thyroid disease    • Tinnitus      Past Surgical History:   Procedure Laterality Date   •  SECTION      Triplets   • COLONOSCOPY     • COLONOSCOPY W/ POLYPECTOMY  2020   • HYSTERECTOMY      age 51   • REDUCTION MAMMAPLASTY       OB History        3    Para   3    Term   3            AB        Living           SAB        IAB        Ectopic        Multiple        Live Births                   Social History     Tobacco Use   • Smoking status: Former     Packs/day: 0 50     Years: 38 00     Pack years: 19 00     Types: Cigarettes     Start date: 0     Quit date: 3/18/2010     Years since quittin 7   • Smokeless tobacco: Never   Vaping Use   • Vaping Use: Never used   Substance Use Topics   • Alcohol use: Yes     Alcohol/week: 4 0 standard drinks     Types: 4 Glasses of wine per week     Comment: social   • Drug use: No     Social History     Substance and Sexual Activity   Sexual Activity Not Currently   • Birth control/protection: None     Cancer-related family history includes Breast cancer (age of onset: 62) in her maternal aunt      Current Outpatient Medications   Medication Instructions   • albuterol (2 5 mg/3 mL) 0 083 % nebulizer solution USE 1 AMPULE VIA NEBULIZER EVERY 6 HOURS AS NEEDED FOR WHEEZING OR SHORTNESS OF BREATH   • calcium carbonate-vitamin D (OSCAL-D) 500 mg-200 units per tablet 1 tablet, Oral, Daily with breakfast   • cholecalciferol (VITAMIN D3) 1,000 Units, Oral, Daily   • fluticasone (FLONASE) 50 mcg/act nasal spray 2 sprays, Nasal, Daily PRN   • Fluticasone-Salmeterol (Advair Diskus) 100-50 mcg/dose inhaler 1 puff, Inhalation, 2 times daily, Rinse mouth after use  • glucose blood test strip Checks blood glucose 3 times a day  • hydrochlorothiazide (HYDRODIURIL) 25 mg, Oral, Every morning   • hydrocortisone 2 5 % cream Topical, 4 times daily PRN   • Lancets (OneTouch Delica Plus [id number]) MISC USE TO CHECK BLOOD SUGAR 3 TIMES A DAY   • levothyroxine 50 mcg, Oral, Daily   • lidocaine (XYLOCAINE) 5 % ointment Topical, As needed   • Multiple Vitamins-Minerals (MULTIVITAMIN ADULT PO) 1 tablet, Every 24 hours   • olmesartan (BENICAR) 40 mg, Oral, Daily   • pantoprazole (PROTONIX) 40 mg, Oral, Daily   • rosuvastatin (CRESTOR) 5 mg, Oral, Daily   • Ventolin  (90 Base) MCG/ACT inhaler 2 puffs, Inhalation, 4 times daily       Review of Systems:  Review of Systems   All other systems reviewed and are negative  Physical Exam:  /80   Pulse 89   Ht 5' 2" (1 575 m)   Wt 104 kg (229 lb)   BMI 41 88 kg/m²   Physical Exam  Constitutional:       Appearance: Normal appearance  Genitourinary:      Bladder and urethral meatus normal       No lesions in the vagina  Right Labia: No rash, tenderness or lesions  Left Labia: No tenderness, lesions or rash  No inguinal adenopathy present in the right or left side  No vaginal discharge or bleeding  Right Adnexa: not tender, not full and no mass present  Left Adnexa: not tender, not full and no mass present  No cervical motion tenderness, discharge or lesion  Uterus is absent  No urethral tenderness or mass present  Breasts:     Right: No swelling, bleeding, inverted nipple, mass, nipple discharge, skin change or tenderness  Left: No swelling, bleeding, inverted nipple, mass, nipple discharge, skin change or tenderness  Breast exam comments: Bilateral breast reduction scars noted  HENT:      Head: Normocephalic  Cardiovascular:      Rate and Rhythm: Normal rate and regular rhythm  Pulmonary:      Effort: Pulmonary effort is normal       Breath sounds: Normal breath sounds  Abdominal:      General: Bowel sounds are normal       Palpations: Abdomen is soft  Hernia: There is no hernia in the left inguinal area or right inguinal area  Musculoskeletal:         General: Normal range of motion  Cervical back: Normal range of motion  Lymphadenopathy:      Upper Body:      Right upper body: No supraclavicular or axillary adenopathy  Left upper body: No supraclavicular or axillary adenopathy  Lower Body: No right inguinal adenopathy  No left inguinal adenopathy  Neurological:      Mental Status: She is alert and oriented to person, place, and time  Skin:     General: Skin is warm and dry  Psychiatric:         Mood and Affect: Mood normal    Vitals reviewed  Assessment/Plan:   1  Normal well-woman GYN exam    2  Cervical cancer screening:  Normal cervical exam     3  STD screening:    Orders placed for vaginal GC/CT cultures  4  Breast cancer screening:  Normal breast exam   Reviewed breast self-awareness  5  Colon cancer screening:  Up to date  6  Depression Screening: Patient's depression screening was assessed with a PHQ-2 score of 0  Their PHQ-9 score was 0  Clinically patient does not have depression  No treatment is required  7  BMI Counseling: Body mass index is 41 88 kg/m²  Discussed the patient's BMI with her  The BMI is above normal  Nutrition recommendations include decreasing overall calorie intake  8  Return to office 1yr/prn      Reviewed with patient that test results are available in [facility] immediately, but that they will not necessarily be reviewed by me immediately  Explained that I will review results at my earliest opportunity and contact patient appropriately

## 2022-12-29 LAB
C TRACH DNA SPEC QL NAA+PROBE: NEGATIVE
N GONORRHOEA DNA SPEC QL NAA+PROBE: NEGATIVE

## 2023-01-06 NOTE — PROGRESS NOTES
Weight Management Medical Nutrition Assessment  Wanda presented for the New Start session with the Healthy CORE Program  Today's weight is 223 9#   Per dietary recall patient skips meals early in the day and has long stretches of time with no meal or snack  She has a busy work schedule and does not always have time to eat  Discussed meal prepping meals and snacks for work  She reports not feeling hungry in the morning hours but she plans to start eating breakfast  Reviewed importance of meal consistency  She is agreeable to measuring portions and food logging in a journal  Will complete SECA and review at f/u visit  We developed and reviewed a low calorie meal plan       Patient seen by Medical Provider in past 6 months:  yes  Requested to schedule appointment with Medical Provider: No      Anthropometric Measurements  Start Weight (#): 223 9# (229# 12/28/22)  Current Weight (#): n/a  TBW % Change from start weight: n/a  Ideal Body Weight (#): 110#  Goal Weight (#): 180# (LTGW)  Highest:  Lowest:    Weight Loss History  Previous weight loss attempts: Self Created Diets (Portion Control, Healthy Food Choices, etc ), Excerise    Food and Nutrition Related History  Wake up: 8AM/9AM  Bed Time: depends on the night    Food Recall  Breakfast: usually skips, 1 cup tea, cereal OR egg whites OR 1 egg w/ slice of toast OR piece of fruit   Snack: skip  Lunch: (before work) usually skips, sandwich on 2 slices wheat bread w/ ernst and turkey or ham, apple   Snack: skip  Dinner: skip or cafeteria at work (salad, sandwich, chicken w/rice)   Snack: sometimes cereal or apple or pear or peaches    Occupation: [address] [employer] (works 3-11PM)    Beverages: 2 cups of coffee w/ creamer, sometimes tea, 1-5 bottles water  Alcohol: 1-2 drinks/week  Volume of beverage intake: 50-60oz    Weekends: Same  Cravings: none  Trouble area of day: in the morning (she skips breakfast)    Frequency of Eating out: rare or sometimes order takeout at work if cafeteria is closed  Food restrictions: none  Cooking: self   Food Shopping: self    Physical Activity Intake  Activity: 10,000 steps at work  Frequency:daily  Physical limitations/barriers to exercise:     Estimated Needs  Energy  SECA: BMR: n/a      X 1 3 -1000 =  [place] Energy Needs: BMR : 9969   1-2# loss weekly sedentary: 811-1311              1-2# loss weekly lightly active: 2444-2603  Maintenance calories for sedentary activity level: 1811  Protein: 60-75gm      (1 2-1 5g/kg IBW)  Fluid: 60oz     (35mL/kg IBW)    Nutrition Diagnosis  Yes;     Overweight/obesity  related to Excess energy intake as evidenced by  BMI more than normative standard for age and sex (obesity-grade III 36+)       Nutrition Intervention    Nutrition Prescription  Calories: 0260-3756 (flex up on active days)  Protein: 60-75gm  Fluid: 60oz    Meal Plan (Alphonse/Pro/Carb)  Breakfast: 400/15  Snack: skip  Lunch: 400/15  Snack: 100-150/5-10  Dinner: 400-450/25  Snack: 100-150/5-10    Nutrition Education:    Healthy Core Manual  Calorie controlled menu  Lean protein food choices  Healthy snack options  Food journaling tips      Nutrition Counseling:  Strategies: meal planning, portion sizes, healthy snack choices, hydration, fiber intake, protein intake, exercise, food journal      Monitoring and Evaluation:  Evaluation criteria:  Energy Intake  Meet protein needs  Maintain adequate hydration  Monitor weekly weight  Meal planning/preparation  Food journal   Decreased portions at mealtimes and snacks  Physical activity     Barriers to learning:none  Readiness to change: Preparation:  (Getting ready to change)   Comprehension: good  Expected Compliance: good

## 2023-01-11 ENCOUNTER — OFFICE VISIT (OUTPATIENT)
Dept: BARIATRICS | Facility: CLINIC | Age: 68
End: 2023-01-11

## 2023-01-11 VITALS — BODY MASS INDEX: 41.2 KG/M2 | HEIGHT: 62 IN | WEIGHT: 223.9 LBS

## 2023-01-11 DIAGNOSIS — E66.01 MORBID OBESITY WITH BMI OF 40.0-44.9, ADULT (HCC): ICD-10-CM

## 2023-01-31 ENCOUNTER — OFFICE VISIT (OUTPATIENT)
Dept: DENTISTRY | Facility: CLINIC | Age: 68
End: 2023-01-31

## 2023-01-31 VITALS — TEMPERATURE: 97.5 F | SYSTOLIC BLOOD PRESSURE: 92 MMHG | DIASTOLIC BLOOD PRESSURE: 61 MMHG | HEART RATE: 63 BPM

## 2023-01-31 DIAGNOSIS — Z01.20 ENCOUNTER FOR DENTAL EXAMINATION: Primary | ICD-10-CM

## 2023-01-31 NOTE — PROGRESS NOTES
Dental procedures in this visit   •  - PERIODIC ORAL EVALUATION - ESTABLISHED PATIENT (Completed)     Service provider: Sol Mathew DDS     Billing provider: Camilo Negrete DDS   •  - PROPHYLAXIS - ADULT (Completed)     Service provider: Suellen Castañeda 195     Billing provider: Camilo Negrete DDS     Subjective   Patient ID: Ishmael Delgado is a 80 y o  female  Chief Complaint   Patient presents with   • Periodic Exam   • Routine Oral Cleaning     Adult Prophy    ASA I  Pain:  Reviewed M/DH     Exams:  Periodic exam   Xrays:     None  Type of Treatment:  Adult Prophy - used Ultrasonic and Hand scaling,  Polished, Flossed  Reviewed OHI  Brush:  2X/day and Floss 1X/day  Recommended Listerine  / ACT  mouth rinses  EO/OCS Exams:  No significant findings  IO: No significant findings  Oral Hygiene:  Good   Plaque:  Light    Calculus:  Light    Bleeding:  none  Gingiva:  Pink  / Firm    Stain:  Light    Perio Charting:  Periocharting was not completed  Probe at next recall    Perio Findings:  Stage 2, Grade B  Caries Findings:  Watch 9 - DL  Caries Risk Assessment:    Moderate caries risk    Treatment Plan:  Updated    Dr  Exam:  Dr Lawrence Lopez  Referral:  No referral given  NV1:   6mrc w/ 4 vertical Bws - 60 min

## 2023-02-08 ENCOUNTER — TELEPHONE (OUTPATIENT)
Dept: PULMONOLOGY | Facility: CLINIC | Age: 68
End: 2023-02-08

## 2023-02-08 NOTE — TELEPHONE ENCOUNTER
Spoke with patient in regards to scheduling a 6 month follow up post CT & PFT at our [facility] office with Dr Lis Schwartz or SALVADOR in April but patient was at work  Informed Wanda I will be mailing a reminder letter for her to schedule her follow up

## 2023-03-20 ENCOUNTER — APPOINTMENT (OUTPATIENT)
Dept: LAB | Facility: HOSPITAL | Age: 68
End: 2023-03-20

## 2023-03-20 DIAGNOSIS — R73.03 PREDIABETES: ICD-10-CM

## 2023-03-20 DIAGNOSIS — E03.9 HYPOTHYROIDISM (ACQUIRED): ICD-10-CM

## 2023-03-20 LAB
ALBUMIN SERPL BCP-MCNC: 3.9 G/DL (ref 3.5–5)
ALP SERPL-CCNC: 54 U/L (ref 34–104)
ALT SERPL W P-5'-P-CCNC: 15 U/L (ref 7–52)
ANION GAP SERPL CALCULATED.3IONS-SCNC: 8 MMOL/L (ref 4–13)
AST SERPL W P-5'-P-CCNC: 17 U/L (ref 13–39)
BASOPHILS # BLD AUTO: 0.05 THOUSANDS/ÂΜL (ref 0–0.1)
BASOPHILS NFR BLD AUTO: 1 % (ref 0–1)
BILIRUB SERPL-MCNC: 0.58 MG/DL (ref 0.2–1)
BUN SERPL-MCNC: 19 MG/DL (ref 5–25)
CALCIUM SERPL-MCNC: 8.8 MG/DL (ref 8.4–10.2)
CHLORIDE SERPL-SCNC: 105 MMOL/L (ref 96–108)
CHOLEST SERPL-MCNC: 184 MG/DL
CO2 SERPL-SCNC: 31 MMOL/L (ref 21–32)
CREAT SERPL-MCNC: 0.82 MG/DL (ref 0.6–1.3)
EOSINOPHIL # BLD AUTO: 0.18 THOUSAND/ÂΜL (ref 0–0.61)
EOSINOPHIL NFR BLD AUTO: 2 % (ref 0–6)
ERYTHROCYTE [DISTWIDTH] IN BLOOD BY AUTOMATED COUNT: 14.5 % (ref 11.6–15.1)
GFR SERPL CREATININE-BSD FRML MDRD: 74 ML/MIN/1.73SQ M
GLUCOSE P FAST SERPL-MCNC: 126 MG/DL (ref 65–99)
HCT VFR BLD AUTO: 43 % (ref 34.8–46.1)
HDLC SERPL-MCNC: 56 MG/DL
HGB BLD-MCNC: 14.2 G/DL (ref 11.5–15.4)
IMM GRANULOCYTES # BLD AUTO: 0.03 THOUSAND/UL (ref 0–0.2)
IMM GRANULOCYTES NFR BLD AUTO: 0 % (ref 0–2)
LDLC SERPL CALC-MCNC: 107 MG/DL (ref 0–100)
LYMPHOCYTES # BLD AUTO: 3.48 THOUSANDS/ÂΜL (ref 0.6–4.47)
LYMPHOCYTES NFR BLD AUTO: 40 % (ref 14–44)
MCH RBC QN AUTO: 26.2 PG (ref 26.8–34.3)
MCHC RBC AUTO-ENTMCNC: 33 G/DL (ref 31.4–37.4)
MCV RBC AUTO: 80 FL (ref 82–98)
MONOCYTES # BLD AUTO: 0.77 THOUSAND/ÂΜL (ref 0.17–1.22)
MONOCYTES NFR BLD AUTO: 9 % (ref 4–12)
NEUTROPHILS # BLD AUTO: 4.14 THOUSANDS/ÂΜL (ref 1.85–7.62)
NEUTS SEG NFR BLD AUTO: 48 % (ref 43–75)
NONHDLC SERPL-MCNC: 128 MG/DL
NRBC BLD AUTO-RTO: 0 /100 WBCS
PLATELET # BLD AUTO: 275 THOUSANDS/UL (ref 149–390)
PMV BLD AUTO: 11.2 FL (ref 8.9–12.7)
POTASSIUM SERPL-SCNC: 3.9 MMOL/L (ref 3.5–5.3)
PROT SERPL-MCNC: 6.7 G/DL (ref 6.4–8.4)
RBC # BLD AUTO: 5.41 MILLION/UL (ref 3.81–5.12)
SODIUM SERPL-SCNC: 144 MMOL/L (ref 135–147)
TRIGL SERPL-MCNC: 105 MG/DL
TSH SERPL DL<=0.05 MIU/L-ACNC: 2.71 UIU/ML (ref 0.45–4.5)
WBC # BLD AUTO: 8.65 THOUSAND/UL (ref 4.31–10.16)

## 2023-04-05 ENCOUNTER — HOSPITAL ENCOUNTER (OUTPATIENT)
Dept: CT IMAGING | Facility: HOSPITAL | Age: 68
Discharge: HOME | End: 2023-04-05
Attending: INTERNAL MEDICINE

## 2023-04-05 DIAGNOSIS — J84.9 INTERSTITIAL LUNG DISEASE (HCC): ICD-10-CM

## 2023-05-01 ENCOUNTER — OFFICE VISIT (OUTPATIENT)
Dept: CARDIOLOGY CLINIC | Facility: CLINIC | Age: 68
End: 2023-05-01

## 2023-05-01 VITALS
SYSTOLIC BLOOD PRESSURE: 124 MMHG | HEART RATE: 58 BPM | BODY MASS INDEX: 41.22 KG/M2 | HEIGHT: 62 IN | DIASTOLIC BLOOD PRESSURE: 66 MMHG | WEIGHT: 224 LBS

## 2023-05-01 DIAGNOSIS — I83.12 VARICOSE VEINS OF BOTH LOWER EXTREMITIES WITH INFLAMMATION: ICD-10-CM

## 2023-05-01 DIAGNOSIS — J84.9 INTERSTITIAL LUNG DISEASE (HCC): ICD-10-CM

## 2023-05-01 DIAGNOSIS — E78.2 MIXED HYPERLIPIDEMIA: ICD-10-CM

## 2023-05-01 DIAGNOSIS — I10 HYPERTENSION, UNSPECIFIED TYPE: Primary | ICD-10-CM

## 2023-05-01 DIAGNOSIS — I83.11 VARICOSE VEINS OF BOTH LOWER EXTREMITIES WITH INFLAMMATION: ICD-10-CM

## 2023-05-01 DIAGNOSIS — E66.01 MORBID OBESITY WITH BMI OF 40.0-44.9, ADULT (HCC): ICD-10-CM

## 2023-05-01 NOTE — PROGRESS NOTES
[facility]   [address] [address] [place] [address]  Phone#  [phone]  Fax#  [phone]  *-*-*-*-*-*-*-*-*-*-*-*-*-*-*-*-*-*-*-*-*-*-*-*-*-*-*-*-*-*-*-*-*-*-*-*-*-*-*-*-*-*-*-*-*-*-*-*-*-*-*-*-*-*    Fabian  DATE: 05/01/23 11:04 AM    PATIENT NAME: Sheyla Avila   1955    [id number]  Age: 80 y o  Sex: female    AUTHOR: Ty Anderson  PRIMARYCARE PHYSICIAN: Mari Reyes MD    DIAGNOSES:  1  Diastolic dysfunction without diastolic CHF  2  Mixed dyslipidemia  3  Asthma  4  Hypothyroidism  5  Benign essential hypertension  6  Class II obesity  7  Suspected sleep apnea  8  Mild interstitial lung disease    ECHOCARDIOGRAM 8/5/2022: Normal left ventricle size, mildly increased work stress, normal left ventricle systolic function, normal diastolic function, normal right ventricle size and function, normal left and right atrial size, normal aortic valve without stenosis or regurgitation, normal mitral valve, trace mitral valve regurgitation, trace tricuspid valve regurgitation, no obvious pulmonary hypertension, no pericardial effusion, mildly dilated ascending aorta measuring 3 4 cm, normal for vena cava and appropriate respirophasic changes in diameter    ECHOCARDIOGRAM May 15, 2019:  Normal left ventricular size, mild concentric LVH, normal left ventricular systolic function, EF of around 35%, normal diastolic function, normal left atrial pressure, aortic valve sclerosis, no stenosis or regurgitation, no mitral valve regurgitation, trace tricuspid valve regurgitation, no obvious pulmonary hypertension and no pericardial effusion  ECHOCARDIOGRAM January 2016 showed mildly prominent basal interventricular septum, normal left ventricular systolic function, EF around 60-65%, mild aortic valve sclerosis without stenosis, trace to mild mitral regurgitation, no pulmonary hypertension       CURRENT ECG:  Results for orders placed or performed in visit on 05/01/23   POCT ECG    Narrative Sinus bradycardia, HR 58 bpm, normal axis and intervals, no significant ST-T wave normalities  No significant chamber hypertrophy or enlargement  No evidence of prior infarction, no ongoing ischemia  CARDIOLOGY ASSESSMENT & PLAN:    1  Primary hypertension  2  Obesity  3  Dyslipidemia  4  Diastolic dysfunction without heart failure    Hypertension  Ms Chevy Ramos is overall doing well from cardiac perspective with no recent symptoms that suggest decompensated heart failure  Her blood pressure is well controlled  Though there were some concerns for obstructive sleep apnea today she denies any significant symptoms  She has developed mild interstitial lung disease and is under the care of pulmonologist   Symptoms seem to be mild overall  Today on physical examination she is noted to have elevated BMI and otherwise exam is normal   Her ECG is normal   She is pursuing nonpharmacologic and nonsurgical ways to improve her weight and she is being followed by the bariatric surgery program Her echocardiogram in October 2022 did not identify evidence of systolic or diastolic dysfunction or significant valvular heart disease or pulmonary hypertension  Today we reviewed all her prior work-up and I reassured her that overall from a cardiac perspective she is doing well  -- I am advising her to continue current medications  -- Advising her to follow-up regularly with her primary physician and with her other specialist   -- A cardiology follow-up can be arranged for 1 year time  Echocardiogram can be considered in 1 to 3 years  -- Patient is advised to continue current medical therapy  -- Dietary and medical compliance are reinforced  -- She is advised  to report any concerning symptoms such as chest pain, shortness of breath, decline in exercise tolerance or presyncope/syncope      INTERVAL HISTORY & HISTORY OF PRESENT ILLNESS:  Chevy Ramos is here for follow-up regarding her cardiac comorbidities which include:  History of hypertension, diastolic dysfunction and comorbidities  She reports overall feeling well with no recent subjective cardiac complaints  She was last seen by me in 2019  Since that time she has been diagnosed with obesity, suspected sleep apnea and mild interstitial lung disease  She has been evaluated by pulmonologist and has very mild reticular involvement  She had an echocardiogram in August 2022 which did not identify evidence of pulmonary hypertension  She has been evaluated by bariatric surgery and various options were discussed with her  She has elected to initially pursue lifestyle measures to control her weight  Today she mentions that from a symptom perspective she has had some exertional shortness of breath but no other significant symptoms  She reports of continued work-related stress  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[place] [address] and patients are challenging many times and have an aggressive and threatening behavior  She is stressed from that but she does had have help from her supervisors  Denies any orthopnea PND or palpitations  Reports occasional pedal edema when she eats some salty stuff  Has had no passing out or near passing out episodes  Reports taking medications consistently  Current cardiac medications: HCTZ 25 mg daily, olmesartan 40 mg daily, rosuvastatin 5 mg daily    Most recent blood work from 3/20/2023 showed sodium 144 potassium 3 9 chloride 105 bicarb 31 BUN 19 creatinine 0 82 normal liver function test, total cholesterol 184 triglyceride 105 HDL 56 calculated  hemoglobin A1c 6 5 in December 2022  TSH recently was 2 71     REVIEW OF SYMPTOMS:    Positive for:  Some fatigue  Occasional lower extremity edema  Negative for: All remaining as reviewed below and in HPI  SYSTEM SYMPTOMS REVIEWED:  General--weight change, fever, night sweats  Respirato      Cardiovascular--chest pain, "syncope, dyspnea on exertion, edema, decline in exercise tolerance, claudication   Gastrointestinal--persistent vomiting, diarrhea, abdominal distention, blood in stool   Muscular or skeletal--joint pain or swelling   Neurologic--headaches, syncope, abnormal movement  Hematologic--history of easy bruising and bleeding   Endocrine--thyroid enlargement, heat or cold intolerance, polyuria   Psychiatric--anxiety, depression     *-*-*-*-*-*-*-*-*-*-*-*-*-*-*-*-*-*-*-*-*-*-*-*-*-*-*-*-*-*-*-*-*-*-*-*-*-*-*-*-*-*-*-*-*-*-*-*-*-*-*-*-*-*-  VITAL SIGNS:  Vitals:    05/01/23 1038   BP: 124/66   Pulse: 58   Weight: 102 kg (224 lb)   Height: 5' 2\" (1 575 m)     PREVIOUS WEIGHTS:   Wt Readings from Last 25 Encounters:   05/01/23 102 kg (224 lb)   01/11/23 102 kg (223 lb 14 4 oz)   12/28/22 104 kg (229 lb)   12/22/22 101 kg (222 lb 12 8 oz)   12/13/22 105 kg (231 lb 3 2 oz)   10/26/22 103 kg (228 lb)   08/05/22 106 kg (233 lb)   07/20/22 106 kg (233 lb)   06/09/22 103 kg (226 lb)   05/09/22 103 kg (226 lb)   04/08/22 98 9 kg (218 lb)   01/01/22 98 9 kg (218 lb)   12/27/21 99 2 kg (218 lb 11 1 oz)   12/02/21 99 3 kg (219 lb)   11/24/21 96 6 kg (213 lb)   11/18/21 96 6 kg (213 lb)   10/27/21 96 6 kg (213 lb)   07/02/21 97 1 kg (214 lb)   05/19/21 96 2 kg (212 lb)   04/09/21 98 9 kg (218 lb)   04/05/21 98 9 kg (218 lb)   01/27/21 98 9 kg (218 lb)   01/13/21 98 kg (216 lb)   01/10/21 99 1 kg (218 lb 8 oz)   01/08/21 99 6 kg (219 lb 8 oz)      *-*-*-*-*-*-*-*-*-*-*-*-*-*-*-*-*-*-*-*-*-*-*-*-*-*-*-*-*-*-*-*-*-*-*-*-*-*-*-*-*-*-*-*-*-*-*-*-*-*-*-*-*-*-  PHYSICAL EXAM:  General Appearance:    Alert, cooperative, no distress, appears stated age, slightly obese   Head, Eyes, ENT:    No obvious abnormality, moist mucous mebranes  Neck:   Supple, no carotid bruit or JVD   Back:     Symmetric, no curvature  Lungs:     Respirations unlabored   Clear to auscultation bilaterally,    Chest wall:    No tenderness or deformity   Heart:    " Regular rate and rhythm, S1 and S2 normal, no murmur, rub  or gallop  Abdomen:     Soft, non-tender,    Extremities:    trace left lower extremity edema noted  Skin:    Mild venous static changes noted in lower extremities    Normal skin color, texture, turgor normal, no rashes or lesions     *-*-*-*-*-*-*-*-*-*-*-*-*-*-*-*-*-*-*-*-*-*-*-*-*-*-*-*-*-*-*-*-*-*-*-*-*-*-*-*-*-*-*-*-*-*-*-*-*-*-*-*-*-*-  CURRENT MEDICATION LIST:    Current Outpatient Medications:     albuterol (2 5 mg/3 mL) 0 083 % nebulizer solution, USE 1 AMPULE VIA NEBULIZER EVERY 6 HOURS AS NEEDED FOR WHEEZING OR SHORTNESS OF BREATH, Disp: 300 mL, Rfl: 0    calcium carbonate-vitamin D (OSCAL-D) 500 mg-200 units per tablet, Take 1 tablet by mouth daily with breakfast, Disp: 60 tablet, Rfl: 5    cholecalciferol (VITAMIN D3) 1,000 units tablet, Take 1 tablet (1,000 Units total) by mouth daily, Disp: 30 tablet, Rfl: 5    fluticasone (FLONASE) 50 mcg/act nasal spray, 2 sprays into each nostril daily as needed for rhinitis, Disp: 16 g, Rfl: 1    Fluticasone-Salmeterol (Advair Diskus) 100-50 mcg/dose inhaler, Inhale 1 puff 2 (two) times a day Rinse mouth after use , Disp: 60 blister, Rfl: 1    glucose blood test strip, Checks blood glucose 3 times a day , Disp: 100 each, Rfl: 3    hydrochlorothiazide (HYDRODIURIL) 25 mg tablet, Take 1 tablet (25 mg total) by mouth every morning, Disp: 90 tablet, Rfl: 3    hydrocortisone 2 5 % cream, Apply topically 4 (four) times a day as needed for irritation, Disp: 30 g, Rfl: 0    Lancets (OneTouch Delica Plus [id number]) MISC, USE TO CHECK BLOOD SUGAR 3 TIMES A DAY, Disp: 100 each, Rfl: 0    levothyroxine 50 mcg tablet, Take 1 tablet (50 mcg total) by mouth daily, Disp: 90 tablet, Rfl: 3    lidocaine (XYLOCAINE) 5 % ointment, Apply topically as needed for mild pain, Disp: 240 g, Rfl: 2    Multiple Vitamins-Minerals (MULTIVITAMIN ADULT PO), 1 tablet every 24 hours, Disp: , Rfl:     olmesartan (BENICAR) 40 mg tablet, Take 1 tablet (40 mg total) by mouth daily, Disp: 90 tablet, Rfl: 3    pantoprazole (PROTONIX) 40 mg tablet, Take 1 tablet (40 mg total) by mouth daily, Disp: 30 tablet, Rfl: 2    rosuvastatin (CRESTOR) 5 mg tablet, Take 1 tablet (5 mg total) by mouth daily, Disp: 90 tablet, Rfl: 3    Ventolin  (90 Base) MCG/ACT inhaler, Inhale 2 puffs 4 (four) times a day, Disp: 18 g, Rfl: 5    ALLERGIES:  No Known Allergies    *-*-*-*-*-*-*-*-*-*-*-*-*-*-*-*-*-*-*-*-*-*-*-*-*-*-*-*-*-*-*-*-*-*-*-*-*-*-*-*-*-*-*-*-*-*-*-*-*-*-*-*-*-*-    LABORATORY DATA:  I have personally reviewed pertinent labs  Blood work from January 2019 is significant for potassium of 3 5, normal renal function and LFTs, hemoglobin A1c of 6 2, slightly abnormal lipid profile, normal TSH      Potassium   Date Value Ref Range Status   03/20/2023 3 9 3 5 - 5 3 mmol/L Final   07/11/2022 4 0 3 6 - 5 0 mmol/L Final   02/11/2022 3 7 3 6 - 5 0 mmol/L Final     Chloride   Date Value Ref Range Status   03/20/2023 105 96 - 108 mmol/L Final   07/11/2022 107 97 - 108 mmol/L Final   02/11/2022 105 97 - 108 mmol/L Final     CO2   Date Value Ref Range Status   03/20/2023 31 21 - 32 mmol/L Final   07/11/2022 30 22 - 30 mmol/L Final   02/11/2022 30 22 - 30 mmol/L Final     BUN   Date Value Ref Range Status   03/20/2023 19 5 - 25 mg/dL Final   07/11/2022 22 5 - 25 mg/dL Final   02/11/2022 18 5 - 25 mg/dL Final     Creatinine   Date Value Ref Range Status   03/20/2023 0 82 0 60 - 1 30 mg/dL Final     Comment:     Standardized to IDMS reference method   07/11/2022 0 64 0 60 - 1 20 mg/dL Final     Comment:     Standardized to IDMS reference method   02/11/2022 0 68 0 60 - 1 20 mg/dL Final     Comment:     Standardized to IDMS reference method     eGFR   Date Value Ref Range Status   03/20/2023 74 ml/min/1 73sq m Final   07/11/2022 93 ml/min/1 73sq m Final   02/11/2022 91 ml/min/1 73sq m Final     Calcium   Date Value Ref Range Status   03/20/2023 8 8 8 4 - 10 2 mg/dL Final   07/11/2022 8 9 8 4 - 10 2 mg/dL Final   02/11/2022 8 7 8 4 - 10 2 mg/dL Final     AST   Date Value Ref Range Status   03/20/2023 17 13 - 39 U/L Final     Comment:     Specimen collection should occur prior to Sulfasalazine administration due to the potential for falsely depressed results  02/11/2022 25 14 - 36 U/L Final     Comment:     Specimen collection should occur prior to Sulfasalazine administration due to the potential for falsely depressed results  03/05/2021 25 14 - 36 U/L Final     Comment:     Specimen collection should occur prior to Sulfasalazine administration due to the potential for falsely depressed results  ALT   Date Value Ref Range Status   03/20/2023 15 7 - 52 U/L Final     Comment:     Specimen collection should occur prior to Sulfasalazine administration due to the potential for falsely depressed results  02/11/2022 17 <35 U/L Final     Comment:     Specimen collection should occur prior to Sulfasalazine administration due to the potential for falsely depressed results  03/05/2021 11 9 - 52 U/L Final     Comment:     Specimen collection should occur prior to Sulfasalazine administration due to the potential for falsely depressed results        Alkaline Phosphatase   Date Value Ref Range Status   03/20/2023 54 34 - 104 U/L Final   02/11/2022 68 43 - 122 U/L Final   03/05/2021 70 43 - 122 U/L Final     Magnesium   Date Value Ref Range Status   07/11/2022 2 1 1 6 - 2 3 mg/dL Final     WBC   Date Value Ref Range Status   03/20/2023 8 65 4 31 - 10 16 Thousand/uL Final   07/11/2022 7 70 4 31 - 10 16 Thousand/uL Final   03/05/2021 8 20 4 50 - 11 00 Thousand/uL Final     Hemoglobin   Date Value Ref Range Status   03/20/2023 14 2 11 5 - 15 4 g/dL Final   07/11/2022 14 0 11 5 - 15 4 g/dL Final   03/05/2021 13 8 12 0 - 16 0 g/dL Final     Platelets   Date Value Ref Range Status   03/20/2023 275 149 - 390 Thousands/uL Final   07/11/2022 293 149 - 390 Thousands/uL Final 2021 276 150 - 450 Thousands/uL Final     PTT   Date Value Ref Range Status   10/07/2020 32 23 - 37 seconds Final     Comment:     Therapeutic Heparin Range =  60-90 seconds     INR   Date Value Ref Range Status   10/07/2020 0 95 0 84 - 1 19 Final     No results found for: CKMB, DIGOXIN  No results found for: TSH  HDL, Direct   Date Value Ref Range Status   2023 56 >=50 mg/dL Final     Triglycerides   Date Value Ref Range Status   2023 105 See Comment mg/dL Final     Comment:     Triglyceride:     0-9Y            <75mg/dL     10Y-17Y         <90 mg/dL       >=18Y     Normal          <150 mg/dL     Borderline High 150-199 mg/dL     High            200-499 mg/dL        Very High       >499 mg/dL    Specimen collection should occur prior to N-Acetylcysteine or Metamizole administration due to the potential for falsely depressed results  Hemoglobin A1C   Date Value Ref Range Status   2022 6 5 6 5 Final   2021 6 0 (H) Normal 3 8-5 6%; PreDiabetic 5 7-6 4%;  Diabetic >=6 5%; Glycemic control for adults with diabetes <7 0% % Final     No results found for: Marval Garden, GRAMSTAIN, URINECX, WOUNDCULT, BODYFLUIDCUL, MRSACULTURE, INFLUAPCR, INFLUBPCR, RSVPCR, LEGIONELLAUR, CDIFFTOXINB    *-*-*-*-*-*-*-*-*-*-*-*-*-*-*-*-*-*-*-*-*-*-*-*-*-*-*-*-*-*-*-*-*-*-*-*-*-*-*-*-*-*-*-*-*-*-*-*-*-*-*-*-*-*-  PREVIOUS CARDIOLOGY & RADIOLOGY RESULTS:  Results for orders placed during the hospital encounter of 05/15/19   Echo complete with contrast if indicated    Narrative [facility] [employer] [address]    Transthoracic Echocardiogram  2D, M-mode, Doppler, and Color Doppler    Study date:  15-May-2019    Patient: Marleny Espitia  MR number: [id number]  Account number: [de-identified]  : 1955  Age: 62 years  Gender: Female  Status: Outpatient  Location: [facility]  Height: 62 in  Weight: 222 6 lb  BP: 122/ 80 mmHg    Indications: Short of Breath    Diagnoses: R06 02 - Shortness of breath    Sonographer:  José Miguel Allison RDCS  Primary Physician:  Bonilla Padilla MD  Referring Physician:  Pa Gordon MD  Group:  [facility]  Interpreting Physician:  Xena Birch MD    SUMMARY    LEFT VENTRICLE:  Normal left ventricular systolic function, EF 86%  Normal left ventricular cavity size  Mild concentric left ventricular hypertrophy  Normal left ventricular wall motion without regional wall motion abnormalities  Normal left ventricular  diastolic function  Normal left atrial pressures  AORTIC VALVE:  Aortic sclerosis without stenosis  No aortic regurgitation  TRICUSPID VALVE:  There was trace regurgitation  HISTORY: PRIOR HISTORY: Risk factors: hypertension and morbid obesity  PROCEDURE: The study was performed in the [facility]  This was a routine study  The transthoracic approach was used  The study included complete 2D imaging, M-mode, complete spectral Doppler, and color Doppler  The heart rate was 77  bpm, at the start of the study  Echocardiographic views were limited due to decreased penetration and lung interference  Image quality was adequate  LEFT VENTRICLE: Normal left ventricular systolic function, EF 45%  Normal left ventricular cavity size  Mild concentric left ventricular hypertrophy  Normal left ventricular wall motion without regional wall motion abnormalities  Normal  left ventricular diastolic function  Normal left atrial pressures  RIGHT VENTRICLE: The size was normal  Systolic function was normal  Wall thickness was normal     LEFT ATRIUM: Size was normal     RIGHT ATRIUM: Size was normal     MITRAL VALVE: Valve structure was normal  There was normal leaflet separation  DOPPLER: The transmitral velocity was within the normal range  There was no evidence for stenosis  There was no regurgitation  AORTIC VALVE: Aortic sclerosis without stenosis  No aortic regurgitation      TRICUSPID VALVE: The valve structure was normal  There was normal leaflet separation  DOPPLER: The transtricuspid velocity was within the normal range  There was no evidence for stenosis  There was trace regurgitation  PULMONIC VALVE: DOPPLER: The transpulmonic velocity was within the normal range  There was no regurgitation  PERICARDIUM: There was no pericardial effusion  The pericardium was normal in appearance  AORTA: The root exhibited normal size  PULMONARY ARTERY: The size was normal  DOPPLER: Systolic pressure was within the normal range  SYSTEM MEASUREMENT TABLES    2D  %FS: 35 78 %  Ao Diam: 2 83 cm  EDV(Teich): 79 56 ml  EF(Teich): 65 69 %  ESV(Teich): 27 3 ml  IVSd: 1 25 cm  LA Area: 14 93 cm2  LA Diam: 4 19 cm  LVEDV MOD A4C: 126 42 ml  LVEF MOD A4C: 63 19 %  LVESV MOD A4C: 46 53 ml  LVIDd: 4 22 cm  LVIDs: 2 71 cm  LVLd A4C: 7 28 cm  LVLs A4C: 5 46 cm  LVPWd: 1 32 cm  RA Area: 13 02 cm2  RVIDd: 3 1 cm  SV MOD A4C: 79 89 ml  SV(Teich): 52 26 ml    CW  TR Vmax: 2 32 m/s  TR maxP 47 mmHg    MM  TAPSE: 2 51 cm    PW  E': 0 08 m/s  E/E': 7 65  MV A David: 0 76 m/s  MV Dec Harvey: 3 19 m/s2  MV DecT: 198 94 ms  MV E David: 0 63 m/s  MV E/A Ratio: 0 83  MV PHT: 57 69 ms  MVA By PHT: 3 81 cm2    [facility] Accredited Echocardiography Laboratory    Prepared and electronically signed by    Kasia Garibay MD  Signed 15-May-2019 13:42:00       No results found for this or any previous visit  No results found for this or any previous visit  No results found for this or any previous visit  No results found       *-*-*-*-*-*-*-*-*-*-*-*-*-*-*-*-*-*-*-*-*-*-*-*-*-*-*-*-*-*-*-*-*-*-*-*-*-*-*-*-*-*-*-*-*-*-*-*-*-*-*-*-*-*-  SIGNATURES:   @QMZ@   [facility]     CC:   MD Gianna Fernandes MD   *-*-*-*-*-*-*-*-*-*-*-*-*-*-*-*-*-*-*-*-*-*-*-*-*-*-*-*-*-*-*-*-*-*-*-*-*-*-*-*-*-*-*-*-*-*-*-*-*-*-*-*-*-*-    Social History     Socioeconomic History    Marital status: /Civil Union     Spouse name: Not on file    Number of children: Not on file    Years of education: Not on file    Highest education level: Not on file   Occupational History    Not on file   Tobacco Use    Smoking status: Former     Packs/day: 0 50     Years: 38 00     Pack years: 19 00     Types: Cigarettes     Start date: 0     Quit date: 3/18/2010     Years since quittin 1    Smokeless tobacco: Never   Vaping Use    Vaping Use: Never used   Substance and Sexual Activity    Alcohol use: Yes     Alcohol/week: 4 0 standard drinks     Types: 4 Glasses of wine per week     Comment: social    Drug use: No    Sexual activity: Not Currently     Birth control/protection: None   Other Topics Concern    Not on file   Social History Narrative    Not on file     Social Determinants of Health     Financial Resource Strain: Low Risk     Difficulty of Paying Living Expenses: Not hard at all   Food Insecurity: No Food Insecurity    Worried About [facility] in the Last Year: Never true    [facility] in the Last Year: Never true   Transportation Needs: No Transportation Needs    Lack of Transportation (Medical): No    Lack of Transportation (Non-Medical):  No   Physical Activity: Not on file   Stress: Not on file   Social Connections: Not on file   Intimate Partner Violence: Not on file   Housing Stability: Not on file      Family History   Problem Relation Age of Onset    Coronary artery disease Maternal Grandfather     Breast cancer Maternal Aunt 62    Stroke Mother     No Known Problems Father     No Known Problems Sister     No Known Problems Daughter     No Known Problems Maternal Grandmother     No Known Problems Paternal Grandmother     No Known Problems Paternal Grandfather     No Known Problems Daughter     No Known Problems Daughter     No Known Problems Sister     No Known Problems Sister     No Known Problems Sister     No Known Problems Sister     No Known Problems Sister     No Known Problems Sister     No Known Problems Maternal Aunt     No Known Problems Maternal Aunt     No Known Problems Maternal Aunt     No Known Problems Maternal Aunt     No Known Problems Maternal Aunt     No Known Problems Paternal Aunt     No Known Problems Paternal Aunt      Past Surgical History:   Procedure Laterality Date     SECTION      Triplets    COLONOSCOPY      COLONOSCOPY W/ POLYPECTOMY  2020    HYSTERECTOMY      age 51    REDUCTION MAMMAPLASTY

## 2023-05-01 NOTE — ASSESSMENT & PLAN NOTE
Ms Starr Nance is overall doing well from cardiac perspective with no recent symptoms that suggest decompensated heart failure  Her blood pressure is well controlled  Though there were some concerns for obstructive sleep apnea today she denies any significant symptoms  She has developed mild interstitial lung disease and is under the care of pulmonologist   Symptoms seem to be mild overall  Today on physical examination she is noted to have elevated BMI and otherwise exam is normal   Her ECG is normal   She is pursuing nonpharmacologic and nonsurgical ways to improve her weight and she is being followed by the bariatric surgery program Her echocardiogram in October 2022 did not identify evidence of systolic or diastolic dysfunction or significant valvular heart disease or pulmonary hypertension  Today we reviewed all her prior work-up and I reassured her that overall from a cardiac perspective she is doing well  -- I am advising her to continue current medications  -- Advising her to follow-up regularly with her primary physician and with her other specialist   -- A cardiology follow-up can be arranged for 1 year time  Echocardiogram can be considered in 1 to 3 years  -- Patient is advised to continue current medical therapy  -- Dietary and medical compliance are reinforced  -- She is advised  to report any concerning symptoms such as chest pain, shortness of breath, decline in exercise tolerance or presyncope/syncope

## 2023-05-01 NOTE — PATIENT INSTRUCTIONS
CARDIOLOGY ASSESSMENT & PLAN:    1  Primary hypertension  2  Obesity  3  Dyslipidemia  4  Diastolic dysfunction without heart failure    Hypertension  Ms Philip Bowen is overall doing well from cardiac perspective with no recent symptoms that suggest decompensated heart failure  Her blood pressure is well controlled  Though there were some concerns for obstructive sleep apnea today she denies any significant symptoms  She has developed mild interstitial lung disease and is under the care of pulmonologist   Symptoms seem to be mild overall  Today on physical examination she is noted to have elevated BMI and otherwise exam is normal   Her ECG is normal   She is pursuing nonpharmacologic and nonsurgical ways to improve her weight and she is being followed by the bariatric surgery program Her echocardiogram in October 2022 did not identify evidence of systolic or diastolic dysfunction or significant valvular heart disease or pulmonary hypertension  Today we reviewed all her prior work-up and I reassured her that overall from a cardiac perspective she is doing well  -- I am advising her to continue current medications  -- Advising her to follow-up regularly with her primary physician and with her other specialist   -- A cardiology follow-up can be arranged for 1 year time  Echocardiogram can be considered in 1 to 3 years  -- Patient is advised to continue current medical therapy  -- Dietary and medical compliance are reinforced  -- She is advised  to report any concerning symptoms such as chest pain, shortness of breath, decline in exercise tolerance or presyncope/syncope

## 2023-05-11 ENCOUNTER — OFFICE VISIT (OUTPATIENT)
Dept: PULMONOLOGY | Facility: CLINIC | Age: 68
End: 2023-05-11

## 2023-05-11 VITALS
DIASTOLIC BLOOD PRESSURE: 68 MMHG | BODY MASS INDEX: 40.89 KG/M2 | OXYGEN SATURATION: 96 % | WEIGHT: 222.2 LBS | HEIGHT: 62 IN | HEART RATE: 65 BPM | SYSTOLIC BLOOD PRESSURE: 118 MMHG

## 2023-05-11 DIAGNOSIS — R91.8 ABNORMAL CT SCAN OF LUNG: ICD-10-CM

## 2023-05-11 DIAGNOSIS — J84.9 INTERSTITIAL LUNG DISEASE (HCC): Primary | ICD-10-CM

## 2023-05-11 DIAGNOSIS — E66.01 MORBID OBESITY WITH BMI OF 40.0-44.9, ADULT (HCC): ICD-10-CM

## 2023-05-11 DIAGNOSIS — I28.8 ENLARGED PULMONARY ARTERY (HCC): ICD-10-CM

## 2023-05-11 DIAGNOSIS — J30.2 SEASONAL ALLERGIC RHINITIS, UNSPECIFIED TRIGGER: ICD-10-CM

## 2023-05-11 DIAGNOSIS — J45.20 MILD INTERMITTENT ASTHMA WITHOUT COMPLICATION: ICD-10-CM

## 2023-05-11 DIAGNOSIS — R06.83 SNORING: ICD-10-CM

## 2023-05-11 RX ORDER — CETIRIZINE HYDROCHLORIDE 10 MG/1
10 TABLET ORAL DAILY
Qty: 30 TABLET | Refills: 0
Start: 2023-05-11

## 2023-05-11 NOTE — ASSESSMENT & PLAN NOTE
Complete PFTs and high-resolution CT chest reviewed with the patient today  No change in imaging and only minimal change in PFTs  She feels that her allergic symptoms affected her ability to complete the PFTs  Based upon her continued stability, it is reasonable to repeat imaging and PFTs in 1 year  She was agreeable to this  She will call if she has any changes sooner  PFTs and high-resolution CT chest for April 2024 ordered today

## 2023-05-11 NOTE — PROGRESS NOTES
Pulmonary Follow-Up Note   Toni Slater 80 y o  female MRN: [id number]  5/11/2023      Assessment/Plan:    Problem List Items Addressed This Visit        Respiratory    Seasonal allergic rhinitis     She is taking Zyrtec and Flonase and will continue with this regimen  Relevant Medications    cetirizine (ZyrTEC) 10 mg tablet    Asthma     She will continue with her Advair and albuterol MDI/nebulized as needed  She is aware of proper use and technique  We will send refills to her pharmacy  I discussed a trial of prednisone; however, she does not have wheezing today  She would like to hold off  She will message our office should her allergic/asthma symptoms not resolve with the continued change of seasons  Interstitial lung disease (RUST 75 ) - Primary     Complete PFTs and high-resolution CT chest reviewed with the patient today  No change in imaging and only minimal change in PFTs  She feels that her allergic symptoms affected her ability to complete the PFTs  Based upon her continued stability, it is reasonable to repeat imaging and PFTs in 1 year  She was agreeable to this  She will call if she has any changes sooner  PFTs and high-resolution CT chest for April 2024 ordered today  Relevant Medications    cetirizine (ZyrTEC) 10 mg tablet    Other Relevant Orders    Complete PFT with post Bronchodilator and Six Minute walk    CT chest high resolution       Cardiovascular and Mediastinum    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[facility] 75 )     She follows with cardiology for diastolic dysfunction  She does have occasional lower extremity swelling and cardiology manages this  She does have risk factors for sleep apnea and sleep study was ordered today as listed below  Relevant Orders    Home Study       Other    Morbid obesity with BMI of 40 0-44 9, adult ([facility] )     We discussed lifestyle modifications and weight loss as able    She does not have time at present to follow-up with weight "management, but she will continue with healthy meal choices and activity as tolerated  Abnormal CT scan of lung     Repeat imaging as listed  Snoring     Wanda has snoring and gasping with daytime somnolence and fatigue  She also has some morning headaches  She will undergo home sleep study to evaluate for sleep apnea  I did discuss with her that if her sleep study is positive for sleep apnea and it is amenable to auto CPAP, that would likely be the next step  She would need a compliance appointment within 90 days after receiving her CPAP machine  Otherwise, follow-up in 1 year as listed  Relevant Orders    Home Study       Education provided at this visit:   Need for Vaccination: Up to date on flu and pneumonia vaccines   Pulmonary Rehab: N/A   Smoking Cessation: Quit 2010   Inhaler Use: Reiterated proper use and technique    Return in about 1 year (around 5/11/2024), or if symptoms worsen or fail to improve  All of Wanda's questions were answered prior to leaving the office today  She will follow-up with Dr Duran Drake in 12 months or sooner should the need arise  She is aware to call our office with any further questions or concerns  History of Present Illness   Reason for Visit: Follow-up  Chief Complaint: \"I have been wheezing  \"  HPI: Boo Jackson is a 80 y o  female who presents to the office today for follow-up of interstitial lung disease and asthma  Overall, Cristobal Sainz has been doing well in the last 6 or 7 months  She does notice that over the last few weeks however she has had increased allergic symptoms with runny nose and some mild wheezing  She has had increased use of her albuterol nebulizer and rescue inhaler despite the use of Advair  She has not needed any antibiotics or steroids for her breathing  She has not had fevers or chills  She has no chest pain  She does not have any worsening shortness of breath from prior unless she is having some wheezing    Her " weight has remained fairly stable  Aside from the above, no other complaints  She has not seen weight management since her last visit and was attempting to lose weight; however, she is having difficulty with that because 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  She has been noticing problems with sleep as well  She does have snoring as well as waking up with gasping episodes  She has unrefreshing sleep and occasional headaches in the morning  She does not does often sleep regularly during the day, but this is because she is quite busy  She also works until 3 AM and then has difficulty sleeping  She typically only gets 4 to 5 hours of sleep nightly  She was referred to sleep medicine for a sleep study, but did not have time to coordinate the appointment  Review of Systems   All other systems reviewed and are negative  A full 12-point review of systems was completed and is negative except for those outlined in the HPI      Historical Information   Past Medical History:   Diagnosis Date   • Asthma    • Colon polyp    • Disease of thyroid gland    • GERD (gastroesophageal reflux disease)    • Hyperlipidemia    • Hypertension    • Obesity    • Pre-diabetes    • Seasonal allergies    • Thyroid disease    • Tinnitus      Past Surgical History:   Procedure Laterality Date   •  SECTION      Triplets   • COLONOSCOPY     • COLONOSCOPY W/ POLYPECTOMY  2020   • HYSTERECTOMY      age 51   • REDUCTION MAMMAPLASTY       Family History   Problem Relation Age of Onset   • Coronary artery disease Maternal Grandfather    • Breast cancer Maternal Aunt 62   • Stroke Mother    • No Known Problems Father    • No Known Problems Sister    • No Known Problems Daughter    • No Known Problems Maternal Grandmother    • No Known Problems Paternal Grandmother    • No Known Problems Paternal Grandfather    • No Known Problems Daughter    • No Known Problems Daughter    • No Known Problems Sister    • No Known Problems Sister    • No Known Problems Sister    • No Known Problems Sister    • No Known Problems Sister    • No Known Problems Sister    • No Known Problems Maternal Aunt    • No Known Problems Maternal Aunt    • No Known Problems Maternal Aunt    • No Known Problems Maternal Aunt    • No Known Problems Maternal Aunt    • No Known Problems Paternal Aunt    • No Known Problems Paternal Aunt      Social History   Social History     Substance and Sexual Activity   Alcohol Use Yes   • Alcohol/week: 4 0 standard drinks   • Types: 4 Glasses of wine per week    Comment: social     Social History     Substance and Sexual Activity   Drug Use No     Social History     Tobacco Use   Smoking Status Former   • Packs/day: 0 50   • Years: 38 00   • Pack years: 19 00   • Types: Cigarettes   • Start date: 0   • Quit date: 3/18/2010   • Years since quittin 1   Smokeless Tobacco Never     E-Cigarette/Vaping   • E-Cigarette Use Never User      E-Cigarette/Vaping Substances   • Nicotine No    • THC No    • CBD No    • Flavoring No    • Other No    • Unknown No        Meds/Allergies     Current Outpatient Medications:   •  albuterol (2 5 mg/3 mL) 0 083 % nebulizer solution, USE 1 AMPULE VIA NEBULIZER EVERY 6 HOURS AS NEEDED FOR WHEEZING OR SHORTNESS OF BREATH, Disp: 300 mL, Rfl: 0  •  calcium carbonate-vitamin D (OSCAL-D) 500 mg-200 units per tablet, Take 1 tablet by mouth daily with breakfast, Disp: 60 tablet, Rfl: 5  •  cetirizine (ZyrTEC) 10 mg tablet, Take 1 tablet (10 mg total) by mouth daily, Disp: 30 tablet, Rfl: 0  •  cholecalciferol (VITAMIN D3) 1,000 units tablet, Take 1 tablet (1,000 Units total) by mouth daily, Disp: 30 tablet, Rfl: 5  •  fluticasone (FLONASE) 50 mcg/act nasal spray, 2 sprays into each nostril daily as needed for rhinitis, Disp: 16 g, Rfl: 1  •  Fluticasone-Salmeterol (Advair Diskus) 100-50 mcg/dose inhaler, Inhale 1 puff 2 (two) times a day Rinse mouth after use , Disp: 60 blister, Rfl: 1  • "glucose blood test strip, Checks blood glucose 3 times a day , Disp: 100 each, Rfl: 3  •  Lancets (OneTouch Delica Plus [id number]) MISC, USE TO CHECK BLOOD SUGAR 3 TIMES A DAY, Disp: 100 each, Rfl: 0  •  levothyroxine 50 mcg tablet, Take 1 tablet (50 mcg total) by mouth daily, Disp: 90 tablet, Rfl: 3  •  lidocaine (XYLOCAINE) 5 % ointment, Apply topically as needed for mild pain, Disp: 240 g, Rfl: 2  •  Multiple Vitamins-Minerals (MULTIVITAMIN ADULT PO), 1 tablet every 24 hours, Disp: , Rfl:   •  olmesartan (BENICAR) 40 mg tablet, Take 1 tablet (40 mg total) by mouth daily, Disp: 90 tablet, Rfl: 3  •  pantoprazole (PROTONIX) 40 mg tablet, Take 1 tablet (40 mg total) by mouth daily, Disp: 30 tablet, Rfl: 2  •  rosuvastatin (CRESTOR) 5 mg tablet, Take 1 tablet (5 mg total) by mouth daily, Disp: 90 tablet, Rfl: 3  •  Ventolin  (90 Base) MCG/ACT inhaler, Inhale 2 puffs 4 (four) times a day, Disp: 18 g, Rfl: 5  •  hydrochlorothiazide (HYDRODIURIL) 25 mg tablet, Take 1 tablet (25 mg total) by mouth every morning, Disp: 90 tablet, Rfl: 3  •  hydrocortisone 2 5 % cream, Apply topically 4 (four) times a day as needed for irritation, Disp: 30 g, Rfl: 0  No Known Allergies    Vitals: Blood pressure 118/68, pulse 65, height 5' 2\" (1 575 m), weight 101 kg (222 lb 3 2 oz), SpO2 96 %, not currently breastfeeding  Body mass index is 40 64 kg/m²  Oxygen Therapy  SpO2: 96 %  Oxygen Therapy: None (Room air)    Physical Exam:  Physical Exam  Vitals reviewed  Constitutional:       General: She is not in acute distress  Appearance: She is well-developed  She is obese  She is not toxic-appearing or diaphoretic  HENT:      Head: Normocephalic and atraumatic  Eyes:      General: No scleral icterus  Neck:      Trachea: No tracheal deviation  Cardiovascular:      Rate and Rhythm: Normal rate and regular rhythm  Heart sounds: S1 normal and S2 normal  No murmur heard  No friction rub  No gallop     Pulmonary:      " "Effort: Pulmonary effort is normal  No tachypnea, accessory muscle usage or respiratory distress  Breath sounds: Normal breath sounds  No stridor  No decreased breath sounds, wheezing, rhonchi or rales  Chest:      Chest wall: No tenderness  Abdominal:      General: Bowel sounds are normal  There is no distension  Palpations: Abdomen is soft  Tenderness: There is no abdominal tenderness  Musculoskeletal:      Cervical back: Neck supple  Right lower leg: No edema  Left lower leg: No edema  Skin:     General: Skin is warm and dry  Findings: No rash  Neurological:      Mental Status: She is alert and oriented to person, place, and time  GCS: GCS eye subscore is 4  GCS verbal subscore is 5  GCS motor subscore is 6  Psychiatric:         Speech: Speech normal          Behavior: Behavior normal  Behavior is cooperative  Imaging and other studies: I have personally reviewed pertinent reports  and I have personally reviewed pertinent films in PACS   High-resolution CT chest showed no change in mild bilateral juxtapleural fibrosis since August 2020  Stable pulmonary artery enlargement  No significant air trapping on expiration  Pulmonary Results (PFTs, PSG): I have personally reviewed pertinent reports  PFTs from April 5, 2023 showed reduced total lung capacity at 73% predicted with relatively normal residual volume and restrictive pattern on spirometry  Diffusing capacity was normal   Walk test did not reveal any desaturation  AFISAL Guzman  [facility]        Portions of the record may have been created with voice recognition software  Occasional wrong word or \"sound a like\" substitutions may have occurred due to the inherent limitations of voice recognition software  Read the chart carefully and recognize, using context, where substitutions have occurred or contact the dictating provider    "

## 2023-05-11 NOTE — ASSESSMENT & PLAN NOTE
Wanda has snoring and gasping with daytime somnolence and fatigue  She also has some morning headaches  She will undergo home sleep study to evaluate for sleep apnea  I did discuss with her that if her sleep study is positive for sleep apnea and it is amenable to auto CPAP, that would likely be the next step  She would need a compliance appointment within 90 days after receiving her CPAP machine  Otherwise, follow-up in 1 year as listed

## 2023-05-11 NOTE — ASSESSMENT & PLAN NOTE
We discussed lifestyle modifications and weight loss as able  She does not have time at present to follow-up with weight management, but she will continue with healthy meal choices and activity as tolerated

## 2023-05-11 NOTE — ASSESSMENT & PLAN NOTE
She will continue with her Advair and albuterol MDI/nebulized as needed  She is aware of proper use and technique  We will send refills to her pharmacy  I discussed a trial of prednisone; however, she does not have wheezing today  She would like to hold off  She will message our office should her allergic/asthma symptoms not resolve with the continued change of seasons

## 2023-05-11 NOTE — ASSESSMENT & PLAN NOTE
She follows with cardiology for diastolic dysfunction  She does have occasional lower extremity swelling and cardiology manages this  She does have risk factors for sleep apnea and sleep study was ordered today as listed below

## 2023-05-12 ENCOUNTER — TELEPHONE (OUTPATIENT)
Dept: OTHER | Facility: OTHER | Age: 68
End: 2023-05-12

## 2023-05-12 NOTE — TELEPHONE ENCOUNTER
P/t calling and stated needed to cancel her appointment to p/u her equipment for her sleep study   Stated will calk back to r/s

## 2023-05-15 ENCOUNTER — TELEPHONE (OUTPATIENT)
Dept: SLEEP CENTER | Facility: CLINIC | Age: 68
End: 2023-05-15

## 2023-05-15 NOTE — TELEPHONE ENCOUNTER
----- Message from Meliza Zarate MD sent at 5/13/2023  9:26 AM EDT -----  Approved    ----- Message -----  From: Hira Walden  Sent: 5/12/2023   8:22 AM EDT  To: Sleep Medicine [place] Provider    This Home sleep study needs approval      If approved please sign and return to clerical pool  If denied please include reasons why  Also provide alternative testing if warranted  Please sign and return to clerical pool

## 2023-06-08 ENCOUNTER — OFFICE VISIT (OUTPATIENT)
Dept: FAMILY MEDICINE CLINIC | Facility: CLINIC | Age: 68
End: 2023-06-08
Payer: COMMERCIAL

## 2023-06-08 VITALS
OXYGEN SATURATION: 96 % | TEMPERATURE: 99.1 F | HEIGHT: 62 IN | HEART RATE: 61 BPM | SYSTOLIC BLOOD PRESSURE: 120 MMHG | WEIGHT: 224.8 LBS | BODY MASS INDEX: 41.37 KG/M2 | RESPIRATION RATE: 17 BRPM | DIASTOLIC BLOOD PRESSURE: 57 MMHG

## 2023-06-08 DIAGNOSIS — I10 HYPERTENSION, UNSPECIFIED TYPE: ICD-10-CM

## 2023-06-08 DIAGNOSIS — M85.88 OSTEOPENIA OF LUMBAR SPINE: ICD-10-CM

## 2023-06-08 DIAGNOSIS — E78.2 MIXED HYPERLIPIDEMIA: ICD-10-CM

## 2023-06-08 DIAGNOSIS — R06.83 SNORING: ICD-10-CM

## 2023-06-08 DIAGNOSIS — J30.2 SEASONAL ALLERGIC RHINITIS, UNSPECIFIED TRIGGER: ICD-10-CM

## 2023-06-08 DIAGNOSIS — E03.9 HYPOTHYROIDISM (ACQUIRED): ICD-10-CM

## 2023-06-08 DIAGNOSIS — R73.03 PREDIABETES: Primary | ICD-10-CM

## 2023-06-08 LAB — SL AMB POCT HEMOGLOBIN AIC: 6.4 (ref ?–6.5)

## 2023-06-08 PROCEDURE — 83036 HEMOGLOBIN GLYCOSYLATED A1C: CPT | Performed by: PHYSICIAN ASSISTANT

## 2023-06-08 PROCEDURE — 99214 OFFICE O/P EST MOD 30 MIN: CPT | Performed by: PHYSICIAN ASSISTANT

## 2023-06-08 RX ORDER — MELATONIN
1000 DAILY
Qty: 30 TABLET | Refills: 5 | Status: SHIPPED | OUTPATIENT
Start: 2023-06-08

## 2023-06-08 RX ORDER — MONTELUKAST SODIUM 10 MG/1
10 TABLET ORAL
Qty: 90 TABLET | Refills: 1 | Status: SHIPPED | OUTPATIENT
Start: 2023-06-08

## 2023-06-08 RX ORDER — METFORMIN HYDROCHLORIDE 500 MG/1
500 TABLET, EXTENDED RELEASE ORAL
Qty: 90 TABLET | Refills: 1 | Status: SHIPPED | OUTPATIENT
Start: 2023-06-08

## 2023-06-08 NOTE — PROGRESS NOTES
Name: Denny Ramos      : 1955      MRN: [id number]  Encounter Provider: Rickie Bowen PA-C  Encounter Date: 2023   Encounter department: [facility]     1  Prediabetes  Assessment & Plan:  Lab Results   Component Value Date    HGBA1C 6 4 2023     Improved from previous but very borderline diabetes  Continue low carb diet  Discussed at length, will start metformin due to shared decision making to increase insulin sensitivity  Orders:  -     POCT hemoglobin A1c  -     metFORMIN (GLUCOPHAGE-XR) 500 mg 24 hr tablet; Take 1 tablet (500 mg total) by mouth daily with dinner    2  Osteopenia of lumbar spine  Assessment & Plan:  Reviewed last DEXA with T-score -1 6 in 2021  Continue vitamin D and calcium supplement  Recommend weight bearing exercise  Due for repeat DEXA  Orders:  -     DXA bone density spine hip and pelvis; Future; Expected date: 2023  -     cholecalciferol (VITAMIN D3) 1,000 units tablet; Take 1 tablet (1,000 Units total) by mouth daily    3  Seasonal allergic rhinitis, unspecified trigger  Assessment & Plan:  Still with symptoms despite zyrtec and flonase  Previously taking montelukast with improvement years ago, will restart 10mg once daily at night  Orders:  -     montelukast (SINGULAIR) 10 mg tablet; Take 1 tablet (10 mg total) by mouth daily at bedtime    4  Hypothyroidism (acquired)  Assessment & Plan:  Lab Results   Component Value Date    [id number] 2 710 2023     Continue same dose levothyroxine  5  Snoring  Assessment & Plan:  Plan for sleep study, missed her appointment due to needed to taking mother in law to hospital  Follow-up sleep medicine  6  Mixed hyperlipidemia  Assessment & Plan:  Lab Results   Component Value Date    LDLCALC 107 (H) 2023     Stable, controlled on rosuvastatin  Reviewed 8 2% ASCVD Risk score         7  Hypertension, unspecified type  Assessment & Plan:  Well-controlled on olmesartan and HCTZ  Reviewed last cardiology consult note in 5/2023  Cora Serrano is a 80 y o  female with a h/o HTN, hypothyrodism, GERD, HLD and prediabetes who presents with worsening allergy symptoms for routine 6 month follow-up  She used to see Dr Malia Vasquez but has known Dr Laney Hoffman since residency  Planning to go part time next year  Just worked a 3am to 3pm shift and is tired  Missed sleep study because had to take her mother in law to hospital  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 and is taking a lot of her time  Allergies are worse this year than previous  No smoking or alcohol use  Review of Systems   Constitutional: Negative for fever and unexpected weight change  HENT: Positive for congestion, postnasal drip, rhinorrhea and sneezing  Eyes: Positive for itching  Respiratory: Negative for shortness of breath  Cardiovascular: Negative for chest pain  Current Outpatient Medications on File Prior to Visit   Medication Sig   • albuterol (2 5 mg/3 mL) 0 083 % nebulizer solution USE 1 AMPULE VIA NEBULIZER EVERY 6 HOURS AS NEEDED FOR WHEEZING OR SHORTNESS OF BREATH   • calcium carbonate-vitamin D (OSCAL-D) 500 mg-200 units per tablet Take 1 tablet by mouth daily with breakfast   • cetirizine (ZyrTEC) 10 mg tablet Take 1 tablet (10 mg total) by mouth daily   • fluticasone (FLONASE) 50 mcg/act nasal spray 2 sprays into each nostril daily as needed for rhinitis   • Fluticasone-Salmeterol (Advair Diskus) 100-50 mcg/dose inhaler Inhale 1 puff 2 (two) times a day Rinse mouth after use  • glucose blood test strip Checks blood glucose 3 times a day     • hydrochlorothiazide (HYDRODIURIL) 25 mg tablet Take 1 tablet (25 mg total) by mouth every morning   • hydrocortisone 2 5 % cream Apply topically 4 (four) times a day as needed for irritation   • Lancets (OneTouch Delica Plus [id number]) MISC USE TO CHECK BLOOD SUGAR 3 TIMES A DAY   • levothyroxine 50 "mcg tablet Take 1 tablet (50 mcg total) by mouth daily   • lidocaine (XYLOCAINE) 5 % ointment Apply topically as needed for mild pain   • Multiple Vitamins-Minerals (MULTIVITAMIN ADULT PO) 1 tablet every 24 hours   • olmesartan (BENICAR) 40 mg tablet Take 1 tablet (40 mg total) by mouth daily   • pantoprazole (PROTONIX) 40 mg tablet Take 1 tablet (40 mg total) by mouth daily   • rosuvastatin (CRESTOR) 5 mg tablet Take 1 tablet (5 mg total) by mouth daily   • Ventolin  (90 Base) MCG/ACT inhaler Inhale 2 puffs 4 (four) times a day       Objective     /57 (BP Location: Left arm, Patient Position: Sitting, Cuff Size: Standard)   Pulse 61   Temp 99 1 °F (37 3 °C) (Tympanic)   Resp 17   Ht 5' 2\" (1 575 m)   Wt 102 kg (224 lb 12 8 oz)   SpO2 96%   BMI 41 12 kg/m²     Physical Exam  Vitals and nursing note reviewed  Constitutional:       Appearance: Normal appearance  HENT:      Head: Normocephalic and atraumatic  Cardiovascular:      Rate and Rhythm: Normal rate and regular rhythm  Pulses: Normal pulses  Heart sounds: Normal heart sounds  Pulmonary:      Effort: Pulmonary effort is normal       Breath sounds: Normal breath sounds  Neurological:      Mental Status: She is alert and oriented to person, place, and time  Mental status is at baseline         Jerod Pérez PA-C  "

## 2023-06-13 NOTE — ASSESSMENT & PLAN NOTE
Lab Results   Component Value Date    LDLCALC 107 (H) 03/20/2023     Stable, controlled on rosuvastatin  Reviewed 8 2% ASCVD Risk score

## 2023-06-13 NOTE — ASSESSMENT & PLAN NOTE
Still with symptoms despite zyrtec and flonase  Previously taking montelukast with improvement years ago, will restart 10mg once daily at night

## 2023-06-13 NOTE — ASSESSMENT & PLAN NOTE
Plan for sleep study, missed her appointment due to needed to taking mother in law to hospital  Follow-up sleep medicine

## 2023-06-13 NOTE — ASSESSMENT & PLAN NOTE
Lab Results   Component Value Date    [id number] 2 710 03/20/2023     Continue same dose levothyroxine

## 2023-06-13 NOTE — ASSESSMENT & PLAN NOTE
Reviewed last DEXA with T-score -1 6 in 4/2021  Continue vitamin D and calcium supplement  Recommend weight bearing exercise  Due for repeat DEXA

## 2023-06-13 NOTE — ASSESSMENT & PLAN NOTE
Lab Results   Component Value Date    HGBA1C 6 4 06/08/2023     Improved from previous but very borderline diabetes  Continue low carb diet  Discussed at length, will start metformin due to shared decision making to increase insulin sensitivity

## 2023-07-12 ENCOUNTER — HOSPITAL ENCOUNTER (OUTPATIENT)
Dept: MAMMOGRAPHY | Facility: CLINIC | Age: 68
Discharge: HOME | End: 2023-07-12
Payer: COMMERCIAL

## 2023-07-12 VITALS — HEIGHT: 62 IN | BODY MASS INDEX: 41.22 KG/M2 | WEIGHT: 224 LBS

## 2023-07-12 DIAGNOSIS — Z12.31 SCREENING MAMMOGRAM FOR BREAST CANCER: ICD-10-CM

## 2023-07-12 PROCEDURE — 77067 SCR MAMMO BI INCL CAD: CPT

## 2023-07-12 PROCEDURE — 77063 BREAST TOMOSYNTHESIS BI: CPT

## 2023-07-31 DIAGNOSIS — Z87.898 HISTORY OF PREDIABETES: ICD-10-CM

## 2023-07-31 RX ORDER — LANCETS 30 GAUGE
EACH MISCELLANEOUS
Qty: 100 EACH | Refills: 0 | Status: SHIPPED | OUTPATIENT
Start: 2023-07-31 | End: 2023-08-01 | Stop reason: SDUPTHER

## 2023-08-01 ENCOUNTER — OFFICE VISIT (OUTPATIENT)
Dept: DENTISTRY | Facility: CLINIC | Age: 68
End: 2023-08-01

## 2023-08-01 VITALS — SYSTOLIC BLOOD PRESSURE: 138 MMHG | TEMPERATURE: 98.5 F | DIASTOLIC BLOOD PRESSURE: 77 MMHG | HEART RATE: 84 BPM

## 2023-08-01 DIAGNOSIS — Z01.20 ENCOUNTER FOR DENTAL EXAMINATION: Primary | ICD-10-CM

## 2023-08-01 DIAGNOSIS — Z87.898 HISTORY OF PREDIABETES: ICD-10-CM

## 2023-08-01 DIAGNOSIS — K64.9 HEMORRHOIDS, UNSPECIFIED HEMORRHOID TYPE: ICD-10-CM

## 2023-08-01 PROCEDURE — D0274 BITEWINGS - 4 RADIOGRAPHIC IMAGES: HCPCS | Performed by: DENTAL HYGIENIST

## 2023-08-01 PROCEDURE — D0120 PERIODIC ORAL EVALUATION - ESTABLISHED PATIENT: HCPCS | Performed by: DENTAL HYGIENIST

## 2023-08-01 PROCEDURE — D0220 INTRAORAL - PERIAPICAL FIRST RADIOGRAPHIC IMAGE: HCPCS | Performed by: DENTAL HYGIENIST

## 2023-08-01 PROCEDURE — D1110 PROPHYLAXIS - ADULT: HCPCS | Performed by: DENTAL HYGIENIST

## 2023-08-01 RX ORDER — INDOMETHACIN 50 MG/1
CAPSULE ORAL
COMMUNITY

## 2023-08-01 RX ORDER — CYCLOBENZAPRINE HCL 10 MG
TABLET ORAL
COMMUNITY

## 2023-08-01 RX ORDER — BENZONATATE 100 MG/1
CAPSULE ORAL
COMMUNITY

## 2023-08-01 RX ORDER — ERGOCALCIFEROL 1.25 MG/1
CAPSULE ORAL
COMMUNITY

## 2023-08-01 RX ORDER — LANCETS 30 GAUGE
EACH MISCELLANEOUS
Qty: 100 EACH | Refills: 0 | Status: SHIPPED | OUTPATIENT
Start: 2023-08-01

## 2023-08-01 NOTE — DENTAL PROCEDURE DETAILS
Romy Tobin presents for a Periodic exam. Verbal consent for treatment given in addition to the forms. Reviewed health history - Patient is ASA II  Consents signed: Yes     Perio: Generalized and Recession  ---pt has bone loss but is stable at this time  Pain Scale: 0  Caries Assessment: Medium  Radiographs: Bitewings x4  Periaplical tooth #32     Oral Hygiene instruction reviewed and given. OHI:  Good  ---lt plaque and calc  ---Cavitron, handscaled, polish, floss  Recommended Hygiene recall visits with Wanda. Treatment Plan:  1.  6mrc  2. Caries control: No decay  ---pt having discomfort when chewing on #29. Dr. Elli Bueno checked the occlusion and tooth was a little high. He reduced the tooth with a high speed handpiece and football dominic. Pt stated the tooth felt better. Advised her to let us know if she starts feeling the same thing again. 3.  Case Difficulty Type 1    Prognosis is Good.   Referrals needed: No  Exam:  Dr. Giulia Alonzo    NV1:  6mrc - 50 min

## 2023-08-02 ENCOUNTER — HOSPITAL ENCOUNTER (OUTPATIENT)
Dept: MAMMOGRAPHY | Facility: CLINIC | Age: 68
Discharge: HOME | End: 2023-08-02
Payer: COMMERCIAL

## 2023-08-02 ENCOUNTER — HOSPITAL ENCOUNTER (OUTPATIENT)
Dept: ULTRASOUND IMAGING | Facility: CLINIC | Age: 68
Discharge: HOME | End: 2023-08-02
Payer: COMMERCIAL

## 2023-08-02 VITALS — HEIGHT: 62 IN | BODY MASS INDEX: 41.22 KG/M2 | WEIGHT: 224 LBS

## 2023-08-02 DIAGNOSIS — R92.8 ABNORMAL SCREENING MAMMOGRAM: ICD-10-CM

## 2023-08-02 PROCEDURE — 76642 ULTRASOUND BREAST LIMITED: CPT

## 2023-08-02 PROCEDURE — G0279 TOMOSYNTHESIS, MAMMO: HCPCS

## 2023-08-02 PROCEDURE — 77065 DX MAMMO INCL CAD UNI: CPT

## 2023-08-06 NOTE — RESULT ENCOUNTER NOTE
Dear Pamela Manley did not find malignancy, however   RECOMMENDATIONS ARE:       - Diagnostic mammogram in 6 months for the right breast.       - Ultrasound in 6 months for the right breast.

## 2023-09-22 DIAGNOSIS — K29.60 OTHER GASTRITIS WITHOUT HEMORRHAGE, UNSPECIFIED CHRONICITY: ICD-10-CM

## 2023-09-22 RX ORDER — PANTOPRAZOLE SODIUM 40 MG/1
40 TABLET, DELAYED RELEASE ORAL DAILY
Qty: 30 TABLET | Refills: 2 | Status: SHIPPED | OUTPATIENT
Start: 2023-09-22

## 2023-10-20 ENCOUNTER — ANESTHESIA (OUTPATIENT)
Dept: GASTROENTEROLOGY | Facility: HOSPITAL | Age: 68
End: 2023-10-20

## 2023-10-20 ENCOUNTER — ANESTHESIA EVENT (OUTPATIENT)
Dept: GASTROENTEROLOGY | Facility: HOSPITAL | Age: 68
End: 2023-10-20

## 2023-10-20 ENCOUNTER — HOSPITAL ENCOUNTER (OUTPATIENT)
Dept: GASTROENTEROLOGY | Facility: HOSPITAL | Age: 68
Setting detail: OUTPATIENT SURGERY
End: 2023-10-20
Attending: COLON & RECTAL SURGERY
Payer: COMMERCIAL

## 2023-10-20 VITALS
SYSTOLIC BLOOD PRESSURE: 108 MMHG | DIASTOLIC BLOOD PRESSURE: 57 MMHG | OXYGEN SATURATION: 96 % | HEART RATE: 58 BPM | RESPIRATION RATE: 18 BRPM | TEMPERATURE: 97.3 F

## 2023-10-20 DIAGNOSIS — Z12.11 ENCOUNTER FOR SCREENING FOR MALIGNANT NEOPLASM OF COLON: ICD-10-CM

## 2023-10-20 DIAGNOSIS — Z86.010 PERSONAL HISTORY OF COLONIC POLYPS: ICD-10-CM

## 2023-10-20 LAB — GLUCOSE SERPL-MCNC: 117 MG/DL (ref 65–140)

## 2023-10-20 PROCEDURE — 82948 REAGENT STRIP/BLOOD GLUCOSE: CPT

## 2023-10-20 PROCEDURE — 88305 TISSUE EXAM BY PATHOLOGIST: CPT | Performed by: PATHOLOGY

## 2023-10-20 RX ORDER — PROPOFOL 10 MG/ML
INJECTION, EMULSION INTRAVENOUS AS NEEDED
Status: DISCONTINUED | OUTPATIENT
Start: 2023-10-20 | End: 2023-10-20

## 2023-10-20 RX ORDER — SODIUM CHLORIDE, SODIUM LACTATE, POTASSIUM CHLORIDE, CALCIUM CHLORIDE 600; 310; 30; 20 MG/100ML; MG/100ML; MG/100ML; MG/100ML
125 INJECTION, SOLUTION INTRAVENOUS CONTINUOUS
Status: DISCONTINUED | OUTPATIENT
Start: 2023-10-20 | End: 2023-10-24 | Stop reason: HOSPADM

## 2023-10-20 RX ORDER — SODIUM CHLORIDE 9 MG/ML
125 INJECTION, SOLUTION INTRAVENOUS CONTINUOUS
Status: DISCONTINUED | OUTPATIENT
Start: 2023-10-20 | End: 2023-10-20

## 2023-10-20 RX ADMIN — PROPOFOL 50 MG: 10 INJECTION, EMULSION INTRAVENOUS at 09:35

## 2023-10-20 RX ADMIN — PROPOFOL 50 MG: 10 INJECTION, EMULSION INTRAVENOUS at 09:27

## 2023-10-20 RX ADMIN — SODIUM CHLORIDE, SODIUM LACTATE, POTASSIUM CHLORIDE, AND CALCIUM CHLORIDE 125 ML/HR: .6; .31; .03; .02 INJECTION, SOLUTION INTRAVENOUS at 09:16

## 2023-10-20 RX ADMIN — PROPOFOL 50 MG: 10 INJECTION, EMULSION INTRAVENOUS at 09:30

## 2023-10-20 RX ADMIN — PROPOFOL 50 MG: 10 INJECTION, EMULSION INTRAVENOUS at 09:38

## 2023-10-20 RX ADMIN — PROPOFOL 100 MG: 10 INJECTION, EMULSION INTRAVENOUS at 09:24

## 2023-10-20 NOTE — DISCHARGE INSTRUCTIONS
[facility] [address] [place] [address] S. [address], [address] [place] [address]  Phone: [phone]    DISCHARGE INSTRUCTIONS:    1.  ___ Complete Exam - Normal    2.  ___ Exam normal, but entire colon not seen. We will discuss this with you. 3.  ___ Polyp(s) removed by "burning" - NO pathology report will follow    4.  _6__ Polyp(s) removed by excision. Pathology report will be available in 4-5 days   Someone from our office will call you with results. 5.  ___ Exam prompted biopsies. Pathology report will be available in 4-5 days   Someone from our office will call you with results. 6.  ___ Exam demonstrated findings that need treatment. Prescriptions will be   Given to you. Return to our office in ____ weeks. Please call for appt. 7.  ___ Original office visit or colonoscopy findings necessitate an office visit. Please call to set up a new appointment    8.  ___ Medication  __________________________________________        [facility]:    - Go straight home and rest today    - No driving or drinking alcohol for 24 hours    - Resume regular diet and medications unless otherwise instructed. Coumadin and Plavix are blood thinners. You can resume these medications on __________.     IF YOU ARE HAVING ANY FEVER, BLEEDING OR PERSISTENT PAIN IN THE ABDOMEN, PLEASE CALL OUR OFFICE ANY DAY OR TIME  [phone]

## 2023-10-20 NOTE — INTERVAL H&P NOTE
H&P reviewed. After examining the patient I find no changes in the patients condition since the H&P had been written.     Vitals:    10/20/23 0843   BP: 115/76   Pulse: 67   Resp: 18   Temp: (!) 97.4 °F (36.3 °C)   SpO2: 94%

## 2023-10-20 NOTE — ANESTHESIA POSTPROCEDURE EVALUATION
Post-Op Assessment Note    CV Status:  Stable  Pain Score: 0    Pain management: adequate     Mental Status:  Alert and awake   Hydration Status:  Euvolemic   PONV Controlled:  Controlled   Airway Patency:  Patent      Post Op Vitals Reviewed: Yes      Staff: CRNA         There were no known notable events for this encounter.     BP 90/50 (10/20/23 0948)    Temp (!) 97.3 °F (36.3 °C) (10/20/23 0948)    Pulse 62 (10/20/23 0948)   Resp 18 (10/20/23 0948)    SpO2 95 % (10/20/23 0948)

## 2023-10-20 NOTE — PROGRESS NOTES
Waiting for ride who is expected around 1100. Pt knows she needs to wait here in her bay in the unit.

## 2023-10-21 ENCOUNTER — APPOINTMENT (EMERGENCY)
Dept: RADIOLOGY | Facility: HOSPITAL | Age: 68
End: 2023-10-21
Payer: COMMERCIAL

## 2023-10-21 ENCOUNTER — HOSPITAL ENCOUNTER (EMERGENCY)
Facility: HOSPITAL | Age: 68
Discharge: HOME | End: 2023-10-21
Attending: EMERGENCY MEDICINE
Payer: COMMERCIAL

## 2023-10-21 VITALS
WEIGHT: 224.87 LBS | OXYGEN SATURATION: 95 % | TEMPERATURE: 97.7 F | DIASTOLIC BLOOD PRESSURE: 63 MMHG | HEART RATE: 67 BPM | BODY MASS INDEX: 41.13 KG/M2 | SYSTOLIC BLOOD PRESSURE: 138 MMHG | RESPIRATION RATE: 20 BRPM

## 2023-10-21 DIAGNOSIS — R07.89 ATYPICAL CHEST PAIN: Primary | ICD-10-CM

## 2023-10-21 LAB
2HR DELTA HS TROPONIN: 0 NG/L
ALBUMIN SERPL BCP-MCNC: 3.9 G/DL (ref 3.5–5)
ALP SERPL-CCNC: 61 U/L (ref 34–104)
ALT SERPL W P-5'-P-CCNC: 14 U/L (ref 7–52)
ANION GAP SERPL CALCULATED.3IONS-SCNC: 9 MMOL/L
AST SERPL W P-5'-P-CCNC: 19 U/L (ref 13–39)
BASOPHILS # BLD AUTO: 0.05 THOUSANDS/ÂΜL (ref 0–0.1)
BASOPHILS NFR BLD AUTO: 1 % (ref 0–1)
BILIRUB SERPL-MCNC: 0.46 MG/DL (ref 0.2–1)
BUN SERPL-MCNC: 14 MG/DL (ref 5–25)
CALCIUM SERPL-MCNC: 9.1 MG/DL (ref 8.4–10.2)
CARDIAC TROPONIN I PNL SERPL HS: 5 NG/L
CARDIAC TROPONIN I PNL SERPL HS: 5 NG/L
CHLORIDE SERPL-SCNC: 100 MMOL/L (ref 96–108)
CO2 SERPL-SCNC: 30 MMOL/L (ref 21–32)
CREAT SERPL-MCNC: 0.74 MG/DL (ref 0.6–1.3)
EOSINOPHIL # BLD AUTO: 0.19 THOUSAND/ÂΜL (ref 0–0.61)
EOSINOPHIL NFR BLD AUTO: 2 % (ref 0–6)
ERYTHROCYTE [DISTWIDTH] IN BLOOD BY AUTOMATED COUNT: 14.2 % (ref 11.6–15.1)
GFR SERPL CREATININE-BSD FRML MDRD: 84 ML/MIN/1.73SQ M
GLUCOSE SERPL-MCNC: 109 MG/DL (ref 65–140)
HCT VFR BLD AUTO: 43.2 % (ref 34.8–46.1)
HGB BLD-MCNC: 14.5 G/DL (ref 11.5–15.4)
IMM GRANULOCYTES # BLD AUTO: 0.04 THOUSAND/UL (ref 0–0.2)
IMM GRANULOCYTES NFR BLD AUTO: 0 % (ref 0–2)
LYMPHOCYTES # BLD AUTO: 5.42 THOUSANDS/ÂΜL (ref 0.6–4.47)
LYMPHOCYTES NFR BLD AUTO: 49 % (ref 14–44)
MCH RBC QN AUTO: 26.6 PG (ref 26.8–34.3)
MCHC RBC AUTO-ENTMCNC: 33.6 G/DL (ref 31.4–37.4)
MCV RBC AUTO: 79 FL (ref 82–98)
MONOCYTES # BLD AUTO: 0.88 THOUSAND/ÂΜL (ref 0.17–1.22)
MONOCYTES NFR BLD AUTO: 8 % (ref 4–12)
NEUTROPHILS # BLD AUTO: 4.44 THOUSANDS/ÂΜL (ref 1.85–7.62)
NEUTS SEG NFR BLD AUTO: 40 % (ref 43–75)
NRBC BLD AUTO-RTO: 0 /100 WBCS
PLATELET # BLD AUTO: 277 THOUSANDS/UL (ref 149–390)
PMV BLD AUTO: 11.6 FL (ref 8.9–12.7)
POTASSIUM SERPL-SCNC: 3.5 MMOL/L (ref 3.5–5.3)
PROT SERPL-MCNC: 6.7 G/DL (ref 6.4–8.4)
RBC # BLD AUTO: 5.46 MILLION/UL (ref 3.81–5.12)
SODIUM SERPL-SCNC: 139 MMOL/L (ref 135–147)
WBC # BLD AUTO: 11.02 THOUSAND/UL (ref 4.31–10.16)

## 2023-10-21 PROCEDURE — 99285 EMERGENCY DEPT VISIT HI MDM: CPT

## 2023-10-21 PROCEDURE — 71045 X-RAY EXAM CHEST 1 VIEW: CPT

## 2023-10-21 PROCEDURE — 93005 ELECTROCARDIOGRAM TRACING: CPT

## 2023-10-21 PROCEDURE — 80053 COMPREHEN METABOLIC PANEL: CPT

## 2023-10-21 PROCEDURE — 84484 ASSAY OF TROPONIN QUANT: CPT

## 2023-10-21 PROCEDURE — 85025 COMPLETE CBC W/AUTO DIFF WBC: CPT

## 2023-10-21 PROCEDURE — 36415 COLL VENOUS BLD VENIPUNCTURE: CPT

## 2023-10-21 NOTE — ED PROVIDER NOTES
History  Chief Complaint   Patient presents with    Chest Pain     Pt arrives c/o chest pain, nausea, sweating, dizziness,burping 1/2 hour PTA     This is a 72-year-old female presenting today for chest pain, diaphoresis, nausea, dizziness, approximately 30 minutes prior to arrival.  Patient states that symptoms are much reduced now but still persistent, specifically the nausea and the pressure in her chest.  Patient does state that her left arm does feel "weird". Denies any radiation of pain to the back or into the neck. States the pain was more of a pressure and specifically denies the pain being tearing or ripping. Denies any numbness in any extremity. No vomiting, abdominal pain, diarrhea, constipation, injuries to the area, urinary symptoms. Denies any fever or chills. Prior to Admission Medications   Prescriptions Last Dose Informant Patient Reported? Taking? Calcium Carbonate 1500 (600 Ca) MG TABS   Yes No   Diclofenac Sodium (VOLTAREN) 1 %   Yes No   Sig: APPLY 2 GRAMS TO THE AFFECTED AREA(S) BY TOPICAL ROUTE 4 TIMES PER DAY   Fluticasone-Salmeterol (Advair Diskus) 100-50 mcg/dose inhaler  Self No No   Sig: Inhale 1 puff 2 (two) times a day Rinse mouth after use.    Lancets (OneTouch Delica Plus [id number]) MISC   No No   Sig: Test 1 time a day   Multiple Vitamins-Minerals (MULTIVITAMIN ADULT PO)  Self Yes No   Si tablet every 24 hours   Ventolin  (90 Base) MCG/ACT inhaler  Self No No   Sig: Inhale 2 puffs 4 (four) times a day   albuterol (2.5 mg/3 mL) 0.083 % nebulizer solution  Self No No   Sig: USE 1 AMPULE VIA NEBULIZER EVERY 6 HOURS AS NEEDED FOR WHEEZING OR SHORTNESS OF BREATH   benzonatate (TESSALON PERLES) 100 mg capsule   Yes No   calcium carbonate-vitamin D (OSCAL-D) 500 mg-200 units per tablet  Self No No   Sig: Take 1 tablet by mouth daily with breakfast   cetirizine (ZyrTEC) 10 mg tablet   No No   Sig: Take 1 tablet (10 mg total) by mouth daily   cholecalciferol (VITAMIN D3) 1,000 units tablet   No No   Sig: Take 1 tablet (1,000 Units total) by mouth daily   cyclobenzaprine (FLEXERIL) 10 mg tablet   Yes No   ergocalciferol (ERGOCALCIFEROL) 1.25 MG (82249 UT) capsule   Yes No   fluticasone (FLONASE) 50 mcg/act nasal spray  Self No No   Si sprays into each nostril daily as needed for rhinitis   glucose blood test strip   No No   Sig: Checks blood glucose 3 times a day. hydrochlorothiazide (HYDRODIURIL) 25 mg tablet  Self No No   Sig: Take 1 tablet (25 mg total) by mouth every morning   hydrocortisone 2.5 % cream   No No   Sig: Apply topically 4 (four) times a day as needed for irritation   indomethacin (INDOCIN) 50 mg capsule   Yes No   Sig: Take 1 capsule 3 times a day by oral route as needed for 10 days.    levothyroxine 50 mcg tablet  Self No No   Sig: Take 1 tablet (50 mcg total) by mouth daily   lidocaine (XYLOCAINE) 5 % ointment  Self No No   Sig: Apply topically as needed for mild pain   metFORMIN (GLUCOPHAGE-XR) 500 mg 24 hr tablet   No No   Sig: Take 1 tablet (500 mg total) by mouth daily with dinner   montelukast (SINGULAIR) 10 mg tablet   No No   Sig: Take 1 tablet (10 mg total) by mouth daily at bedtime   olmesartan (BENICAR) 40 mg tablet  Self No No   Sig: Take 1 tablet (40 mg total) by mouth daily   pantoprazole (PROTONIX) 40 mg tablet   No No   Sig: Take 1 tablet (40 mg total) by mouth daily   rosuvastatin (CRESTOR) 5 mg tablet  Self No No   Sig: Take 1 tablet (5 mg total) by mouth daily      Facility-Administered Medications: None       Past Medical History:   Diagnosis Date    Asthma     Colon polyp     Diabetes mellitus (HCC)     Disease of thyroid gland     GERD (gastroesophageal reflux disease)     Hyperlipidemia     Hypertension     Obesity     Pre-diabetes     Seasonal allergies     Thyroid disease     Tinnitus        Past Surgical History:   Procedure Laterality Date     SECTION      Triplets    COLONOSCOPY      COLONOSCOPY W/ POLYPECTOMY  2020 HYSTERECTOMY      age 51    REDUCTION MAMMAPLASTY  2009       Family History   Problem Relation Age of Onset    Coronary artery disease Maternal Grandfather     Breast cancer Maternal Aunt 62    Stroke Mother     No Known Problems Father     No Known Problems Sister     No Known Problems Daughter     No Known Problems Maternal Grandmother     No Known Problems Paternal Grandmother     No Known Problems Paternal Grandfather     No Known Problems Daughter     No Known Problems Daughter     No Known Problems Sister     No Known Problems Sister     No Known Problems Sister     No Known Problems Sister     No Known Problems Sister     No Known Problems Sister     No Known Problems Maternal Aunt     No Known Problems Maternal Aunt     No Known Problems Maternal Aunt     No Known Problems Maternal Aunt     No Known Problems Maternal Aunt     No Known Problems Paternal Aunt     No Known Problems Paternal Aunt      I have reviewed and agree with the history as documented. E-Cigarette/Vaping    E-Cigarette Use Never User      E-Cigarette/Vaping Substances    Nicotine No     THC No     CBD No     Flavoring No     Other No     Unknown No      Social History     Tobacco Use    Smoking status: Former     Packs/day: 0.50     Years: 38.00     Total pack years: 19.00     Types: Cigarettes     Start date: 0     Quit date: 3/18/2010     Years since quittin.6    Smokeless tobacco: Never   Vaping Use    Vaping Use: Never used   Substance Use Topics    Alcohol use: Yes     Alcohol/week: 4.0 standard drinks of alcohol     Types: 4 Glasses of wine per week     Comment: social    Drug use: No       Review of Systems   Constitutional:  Positive for diaphoresis. Negative for chills and fever. HENT:  Negative for ear pain and sore throat. Eyes:  Negative for pain and visual disturbance. Respiratory:  Positive for chest tightness. Negative for apnea, cough, choking, shortness of breath, wheezing and stridor. Cardiovascular:  Positive for chest pain. Negative for palpitations and leg swelling. Gastrointestinal:  Positive for nausea. Negative for abdominal distention, abdominal pain, anal bleeding, blood in stool, constipation, diarrhea, rectal pain and vomiting. Genitourinary:  Negative for dysuria and hematuria. Musculoskeletal:  Negative for arthralgias and back pain. Skin:  Negative for color change and rash. Neurological:  Negative for seizures, syncope, weakness and headaches. All other systems reviewed and are negative. Physical Exam  Physical Exam  Vitals and nursing note reviewed. Constitutional:       General: She is not in acute distress. Appearance: She is well-developed. She is not ill-appearing. HENT:      Head: Normocephalic and atraumatic. Eyes:      Conjunctiva/sclera: Conjunctivae normal.   Neck:      Vascular: No JVD. Cardiovascular:      Rate and Rhythm: Normal rate and regular rhythm. Pulses:           Carotid pulses are 2+ on the right side and 2+ on the left side. Radial pulses are 2+ on the right side and 2+ on the left side. Dorsalis pedis pulses are 2+ on the right side and 2+ on the left side. Posterior tibial pulses are 2+ on the right side and 2+ on the left side. Heart sounds: Normal heart sounds. No murmur heard. Pulmonary:      Effort: Pulmonary effort is normal. No respiratory distress. Breath sounds: Decreased breath sounds present. No wheezing, rhonchi or rales. Abdominal:      Palpations: Abdomen is soft. Tenderness: There is no abdominal tenderness. Musculoskeletal:         General: No swelling. Cervical back: Neck supple. Skin:     General: Skin is warm and dry. Capillary Refill: Capillary refill takes less than 2 seconds. Neurological:      Mental Status: She is alert.    Psychiatric:         Mood and Affect: Mood normal.         Vital Signs  ED Triage Vitals   Temperature Pulse Respirations Blood Pressure SpO2   10/21/23 1823 10/21/23 1823 10/21/23 1823 10/21/23 1823 10/21/23 1823   97.7 °F (36.5 °C) 70 22 161/80 94 %      Temp Source Heart Rate Source Patient Position - Orthostatic VS BP Location FiO2 (%)   10/21/23 1823 10/21/23 1823 10/21/23 1823 10/21/23 1823 --   Tympanic Monitor Sitting Left arm       Pain Score       10/21/23 1934       No Pain           Vitals:    10/21/23 1823 10/21/23 1934 10/21/23 2032   BP: 161/80 135/68 138/63   Pulse: 70 63 67   Patient Position - Orthostatic VS: Sitting Sitting Lying         Visual Acuity      ED Medications  Medications - No data to display    Diagnostic Studies  Results Reviewed       Procedure Component Value Units Date/Time    HS Troponin I 2hr [[id number]]  (Normal) Collected: 10/21/23 2048    Lab Status: Final result Specimen: Blood from Arm, Right Updated: 10/21/23 2117     hs TnI 2hr 5 ng/L      Delta 2hr hsTnI 0 ng/L     HS Troponin I 4hr [[id number]]     Lab Status: No result Specimen: Blood     HS Troponin 0hr (reflex protocol) [[id number]]  (Normal) Collected: 10/21/23 1900    Lab Status: Final result Specimen: Blood from Arm, Right Updated: 10/21/23 1934     hs TnI 0hr 5 ng/L     Comprehensive metabolic panel [[id number]] Collected: 10/21/23 1900    Lab Status: Final result Specimen: Blood from Arm, Right Updated: 10/21/23 1929     Sodium 139 mmol/L      Potassium 3.5 mmol/L      Chloride 100 mmol/L      CO2 30 mmol/L      ANION GAP 9 mmol/L      BUN 14 mg/dL      Creatinine 0.74 mg/dL      Glucose 109 mg/dL      Calcium 9.1 mg/dL      AST 19 U/L      ALT 14 U/L      Alkaline Phosphatase 61 U/L      Total Protein 6.7 g/dL      Albumin 3.9 g/dL      Total Bilirubin 0.46 mg/dL      eGFR 84 ml/min/1.73sq m     Narrative:      [facility] guidelines for Chronic Kidney Disease (CKD):     Stage 1 with normal or high GFR (GFR > 90 mL/min/1.73 square meters)    Stage 2 Mild CKD (GFR = 60-89 mL/min/1.73 square meters)    Stage 3A Moderate CKD (GFR = 45-59 mL/min/1.73 square meters)    Stage 3B Moderate CKD (GFR = 30-44 mL/min/1.73 square meters)    Stage 4 Severe CKD (GFR = 15-29 mL/min/1.73 square meters)    Stage 5 End Stage CKD (GFR <15 mL/min/1.73 square meters)  Note: GFR calculation is accurate only with a steady state creatinine    CBC and differential [[id number]]  (Abnormal) Collected: 10/21/23 1900    Lab Status: Final result Specimen: Blood from Arm, Right Updated: 10/21/23 1913     WBC 11.02 Thousand/uL      RBC 5.46 Million/uL      Hemoglobin 14.5 g/dL      Hematocrit 43.2 %      MCV 79 fL      MCH 26.6 pg      MCHC 33.6 g/dL      RDW 14.2 %      MPV 11.6 fL      Platelets 703 Thousands/uL      nRBC 0 /100 WBCs      Neutrophils Relative 40 %      Immat GRANS % 0 %      Lymphocytes Relative 49 %      Monocytes Relative 8 %      Eosinophils Relative 2 %      Basophils Relative 1 %      Neutrophils Absolute 4.44 Thousands/µL      Immature Grans Absolute 0.04 Thousand/uL      Lymphocytes Absolute 5.42 Thousands/µL      Monocytes Absolute 0.88 Thousand/µL      Eosinophils Absolute 0.19 Thousand/µL      Basophils Absolute 0.05 Thousands/µL                    XR chest 1 view portable   ED Interpretation by Rosemary Gonsales PA-C (10/21 1912)   Cardiomegaly. No acute cardiopulmonary process.                  Procedures  ECG 12 Lead Documentation Only    Date/Time: 10/21/2023 9:37 PM    Performed by: Rosemary Gonsales PA-C  Authorized by: Rosemary Gonsales PA-C    Indications / Diagnosis:  CP  ECG reviewed by me, the ED Provider: yes    Patient location:  ED  Previous ECG:     Previous ECG:  Unavailable  Interpretation:     Interpretation: normal    Rate:     ECG rate:  67    ECG rate assessment: normal    Rhythm:     Rhythm: sinus rhythm    Ectopy:     Ectopy: none    QRS:     QRS axis:  Normal    QRS intervals:  Normal  Conduction:     Conduction: normal    ST segments:     ST segments:  Normal  T waves:     T waves: normal    Q waves: Q waves:  III  Comments:        Qtc 435             ED Course  ED Course as of 10/21/23 2139   Sat Oct 21, 2023   1902 Patient informs me that she takes aspirin everyday, including today. 1916 WBC(!): 11.02   1935 hs TnI 0hr: 5  Will trend. Started 30 min PTA               HEART Risk Score      Flowsheet Row Most Recent Value   Heart Score Risk Calculator    History 2 Filed at: 10/21/2023 2118   ECG 0 Filed at: 10/21/2023 2118   Age 2 Filed at: 10/21/2023 2118   Risk Factors 1 Filed at: 10/21/2023 2118   Troponin 0 Filed at: 10/21/2023 2118   HEART Score 5 Filed at: 10/21/2023 2118                          SBIRT 20yo+      Flowsheet Row Most Recent Value   Initial Alcohol Screen: US AUDIT-C     1. How often do you have a drink containing alcohol? 0 Filed at: 10/21/2023 1917   2. How many drinks containing alcohol do you have on a typical day you are drinking? 0 Filed at: 10/21/2023 1917   3a. Male UNDER 65: How often do you have five or more drinks on one occasion? 0 Filed at: 10/21/2023 1917   3b. FEMALE Any Age, or MALE 65+: How often do you have 4 or more drinks on one occassion? 0 Filed at: 10/21/2023 1917   Audit-C Score 0 Filed at: 10/21/2023 9668   BECKA: How many times in the past year have you. .. Used an illegal drug or used a prescription medication for non-medical reasons? Never Filed at: 10/21/2023 3257                      Medical Decision Making  80year old female presenting today with concerns of chest pain, diaphoresis. ACS rule out. Patient already took aspirin this morning. This started 30 minutes prior to arrival, will require 2 troponins. EKG reviewed by me, please see above documentation. Chest x-ray is also reviewed by me. Lab work-up reassuring, delta Trope was 0. Patient's heart score of 5, will have patient follow-up with cardiology, referral sent. Patient at time of discharge well-appearing in no acute distress, all questions answered.  Patient agreeable to plan.  Patient's vitals, lab/imaging results, diagnosis, and treatment plan were discussed with the patient. All new/changed medications were discussed with patient, specifically, route of administration, how often and when to take, and where they can be picked up. Strict return precautions as well as close follow up with PCP was discussed with the patient and the patient was agreeable to my recommendations. Patient verbally acknowledged understanding of the above communications. All labs reviewed and utilized in the medical decision making process (if labs were ordered). Portions of the record may have been created with voice recognition software. Occasional wrong word or "sound a like" substitutions may have occurred due to the inherent limitations of voice recognition software. Read the chart carefully and recognize, using context, where substitutions have occurred. Amount and/or Complexity of Data Reviewed  Labs: ordered. Decision-making details documented in ED Course. Radiology: ordered and independent interpretation performed. Disposition  Final diagnoses:   Atypical chest pain     Time reflects when diagnosis was documented in both MDM as applicable and the Disposition within this note       Time User Action Codes Description Comment    10/21/2023  9:19 PM Nette Ochoa Add [R07.89] Atypical chest pain           ED Disposition       ED Disposition   Discharge    Condition   Stable    Date/Time   Sat Oct 21, 2023 2118    Comment   Wanda Schaffer discharge to home/self care.                    Follow-up Information       Follow up With Specialties Details Why Contact Info Additional [address] [facility] Emergency Medicine Go to  If symptoms worsen [address] [id number]  [address] [facility] Emergency Department    Ari Lara MD Family Medicine Schedule an appointment as soon as possible for a visit  As needed Angelica  [address]  [address] [facility] Cardiology Schedule an appointment as soon as possible for a visit   [address] [facility] [address]  [facility] [place], [address] [address], [id number] [phone]            Patient's Medications   Discharge Prescriptions    No medications on file           PDMP Review       None            ED Provider  Electronically Signed by             Bonifacio Rivera PA-C  10/21/23 0342

## 2023-10-22 LAB
ATRIAL RATE: 67 BPM
P AXIS: 59 DEGREES
PR INTERVAL: 200 MS
QRS AXIS: 14 DEGREES
QRSD INTERVAL: 86 MS
QT INTERVAL: 412 MS
QTC INTERVAL: 435 MS
T WAVE AXIS: 42 DEGREES
VENTRICULAR RATE: 67 BPM

## 2023-10-22 PROCEDURE — 93010 ELECTROCARDIOGRAM REPORT: CPT

## 2023-10-24 PROCEDURE — 88305 TISSUE EXAM BY PATHOLOGIST: CPT | Performed by: PATHOLOGY

## 2023-11-20 DIAGNOSIS — J45.909 MILD ASTHMA WITHOUT COMPLICATION, UNSPECIFIED WHETHER PERSISTENT: ICD-10-CM

## 2023-11-20 DIAGNOSIS — I10 HYPERTENSION, UNSPECIFIED TYPE: ICD-10-CM

## 2023-11-20 RX ORDER — HYDROCHLOROTHIAZIDE 25 MG/1
25 TABLET ORAL DAILY
Qty: 90 TABLET | Refills: 0 | Status: SHIPPED | OUTPATIENT
Start: 2023-11-20 | End: 2023-11-20 | Stop reason: SDUPTHER

## 2023-11-20 RX ORDER — ALBUTEROL SULFATE 2.5 MG/3ML
SOLUTION RESPIRATORY (INHALATION)
Qty: 300 ML | Refills: 0 | Status: SHIPPED | OUTPATIENT
Start: 2023-11-20

## 2023-11-20 RX ORDER — HYDROCHLOROTHIAZIDE 25 MG/1
25 TABLET ORAL DAILY
Qty: 90 TABLET | Refills: 0 | Status: SHIPPED | OUTPATIENT
Start: 2023-11-20

## 2023-11-21 DIAGNOSIS — J45.909 MILD ASTHMA WITHOUT COMPLICATION, UNSPECIFIED WHETHER PERSISTENT: ICD-10-CM

## 2023-11-21 RX ORDER — ALBUTEROL SULFATE 90 UG/1
2 AEROSOL, METERED RESPIRATORY (INHALATION) 4 TIMES DAILY
Qty: 18 G | Refills: 5 | Status: SHIPPED | OUTPATIENT
Start: 2023-11-21

## 2023-12-19 ENCOUNTER — APPOINTMENT (OUTPATIENT)
Dept: LAB | Facility: HOSPITAL | Age: 68
End: 2023-12-19
Payer: COMMERCIAL

## 2023-12-19 ENCOUNTER — OFFICE VISIT (OUTPATIENT)
Dept: FAMILY MEDICINE CLINIC | Facility: CLINIC | Age: 68
End: 2023-12-19
Payer: COMMERCIAL

## 2023-12-19 VITALS
BODY MASS INDEX: 41.04 KG/M2 | SYSTOLIC BLOOD PRESSURE: 120 MMHG | WEIGHT: 223 LBS | HEART RATE: 62 BPM | TEMPERATURE: 98 F | HEIGHT: 62 IN | OXYGEN SATURATION: 98 % | DIASTOLIC BLOOD PRESSURE: 70 MMHG | RESPIRATION RATE: 16 BRPM

## 2023-12-19 DIAGNOSIS — I10 HYPERTENSION, UNSPECIFIED TYPE: ICD-10-CM

## 2023-12-19 DIAGNOSIS — K29.60 OTHER GASTRITIS WITHOUT HEMORRHAGE, UNSPECIFIED CHRONICITY: ICD-10-CM

## 2023-12-19 DIAGNOSIS — E11.65 TYPE 2 DIABETES MELLITUS WITH HYPERGLYCEMIA, WITHOUT LONG-TERM CURRENT USE OF INSULIN (HCC): ICD-10-CM

## 2023-12-19 DIAGNOSIS — E03.9 HYPOTHYROIDISM (ACQUIRED): ICD-10-CM

## 2023-12-19 DIAGNOSIS — Z00.01 ENCOUNTER FOR GENERAL ADULT MEDICAL EXAMINATION WITH ABNORMAL FINDINGS: Primary | ICD-10-CM

## 2023-12-19 DIAGNOSIS — J45.909 MODERATE ASTHMA, UNSPECIFIED WHETHER COMPLICATED, UNSPECIFIED WHETHER PERSISTENT: ICD-10-CM

## 2023-12-19 DIAGNOSIS — J30.2 SEASONAL ALLERGIC RHINITIS, UNSPECIFIED TRIGGER: ICD-10-CM

## 2023-12-19 DIAGNOSIS — E66.01 MORBID OBESITY WITH BMI OF 40.0-44.9, ADULT (HCC): ICD-10-CM

## 2023-12-19 LAB
CREAT UR-MCNC: 68.7 MG/DL
MICROALBUMIN UR-MCNC: <7 MG/L
MICROALBUMIN/CREAT 24H UR: <10 MG/G CREATININE (ref 0–30)
SL AMB POCT HEMOGLOBIN AIC: 6.4 (ref ?–6.5)

## 2023-12-19 PROCEDURE — 83036 HEMOGLOBIN GLYCOSYLATED A1C: CPT | Performed by: FAMILY MEDICINE

## 2023-12-19 PROCEDURE — 82570 ASSAY OF URINE CREATININE: CPT | Performed by: FAMILY MEDICINE

## 2023-12-19 PROCEDURE — 99396 PREV VISIT EST AGE 40-64: CPT | Performed by: FAMILY MEDICINE

## 2023-12-19 PROCEDURE — 82043 UR ALBUMIN QUANTITATIVE: CPT | Performed by: FAMILY MEDICINE

## 2023-12-19 PROCEDURE — 99214 OFFICE O/P EST MOD 30 MIN: CPT | Performed by: FAMILY MEDICINE

## 2023-12-19 RX ORDER — OLMESARTAN MEDOXOMIL 40 MG/1
40 TABLET ORAL DAILY
Qty: 90 TABLET | Refills: 3 | Status: SHIPPED | OUTPATIENT
Start: 2023-12-19

## 2023-12-19 RX ORDER — LEVOTHYROXINE SODIUM 0.05 MG/1
50 TABLET ORAL DAILY
Qty: 90 TABLET | Refills: 3 | Status: SHIPPED | OUTPATIENT
Start: 2023-12-19

## 2023-12-19 RX ORDER — MONTELUKAST SODIUM 10 MG/1
10 TABLET ORAL
Qty: 90 TABLET | Refills: 1 | Status: SHIPPED | OUTPATIENT
Start: 2023-12-19

## 2023-12-19 RX ORDER — PANTOPRAZOLE SODIUM 40 MG/1
40 TABLET, DELAYED RELEASE ORAL DAILY
Qty: 30 TABLET | Refills: 2 | Status: SHIPPED | OUTPATIENT
Start: 2023-12-19

## 2023-12-19 RX ORDER — HYDROCHLOROTHIAZIDE 25 MG/1
25 TABLET ORAL DAILY
Qty: 90 TABLET | Refills: 0 | Status: SHIPPED | OUTPATIENT
Start: 2023-12-19

## 2023-12-19 RX ORDER — ROSUVASTATIN CALCIUM 5 MG/1
5 TABLET, COATED ORAL DAILY
Qty: 90 TABLET | Refills: 3 | Status: SHIPPED | OUTPATIENT
Start: 2023-12-19

## 2023-12-19 RX ORDER — FLUTICASONE PROPIONATE AND SALMETEROL 100; 50 UG/1; UG/1
1 POWDER RESPIRATORY (INHALATION) 2 TIMES DAILY
Qty: 60 BLISTER | Refills: 1 | Status: SHIPPED | OUTPATIENT
Start: 2023-12-19

## 2023-12-19 NOTE — PROGRESS NOTES
Name: Wanda Schaffer      : 1955      MRN: [id number]  Encounter Provider: Ba Jason MD  Encounter Date: 2023   Encounter department: [facility] PRIMARY CARE [facility]    Assessment & Plan   Patient is here for PE, not having any acute distress.    Hypertension: Blood pressure slightly above goal, will continue current medication and reinforce lifestyle modifications. Recheck BP in 3 months.  Type 2 Diabetes Mellitus: HbA1c indicates good glycemic control. Continue Metformin, encourage dietary adherence, and regular exercise. Follow up in 3 months.  Hyperlipidemia: LDL controlled with Atorvastatin. Continue current dose and reassess lipid panel in 6 months.  General Health: Encourage annual flu vaccine, pneumococcal vaccine per age-appropriate guidelines, and continuation of a balanced diet and regular physical activity.  We spoke about  possible causes of her allergies, patient will continue with the OTC antiallergic meds.  I explained to patient that  antibiotics are not recommended at this time. Use Olopatadine for patient's eyes discomfort and fluticasone nasal spray as needed.  she will return as needed.     Wanda is doing better with control of hyperglycemia by   Lab Results   Component Value Date    HGBA1C 6.4 2023     patient is suffering of gastritis symptoms, a PPI was recommended with the caution of side effects, a follow up will be needed in 3 weeks, if symptoms persist, a EGD is going to be offered. We discussed about diet and OTC medication as cause of current problem.   She complaints with statin therapy and kidney protection therapy.  The goal in treatment is a shared decition that she accepted and it is to decrease risk of vascular disease and it complications.     Discussed complication from uncontrolled Diabetes and hypoglycemia symptoms.  The importance of following with diabetes educator and life style modification also was stressed.    Compliance with  treatment encouraged.  Call if side effect with the treatment.  Bring blood sugars log book at the next appointment.    BMI Counseling: Body mass index is 40.79 kg/m². The BMI is above normal. Nutrition recommendations include reducing portion sizes.  1. Encounter for general adult medical examination with abnormal findings  -     Lipid Panel with Direct LDL reflex; Future    2. Morbid obesity with BMI of 40.0-44.9, adult ([facility])    3. Hypothyroidism (acquired)  -     TSH, 3rd generation; Future  -     levothyroxine 50 mcg tablet; Take 1 tablet (50 mcg total) by mouth daily    4. Type 2 diabetes mellitus with hyperglycemia, without long-term current use of insulin ([facility])  -     Albumin / creatinine urine ratio; Future  -     POCT hemoglobin A1c  -     tirzepatide 5 MG/0.5ML; Inject 0.5 mL (5 mg total) under the skin every 7 days  -     Albumin / creatinine urine ratio    5. Moderate asthma, unspecified whether complicated, unspecified whether persistent  -     Fluticasone-Salmeterol (Advair Diskus) 100-50 mcg/dose inhaler; Inhale 1 puff 2 (two) times a day Rinse mouth after use.    6. Hypertension, unspecified type  -     hydrochlorothiazide (HYDRODIURIL) 25 mg tablet; Take 1 tablet (25 mg total) by mouth daily  -     olmesartan (BENICAR) 40 mg tablet; Take 1 tablet (40 mg total) by mouth daily  -     rosuvastatin (CRESTOR) 5 mg tablet; Take 1 tablet (5 mg total) by mouth daily    7. Seasonal allergic rhinitis, unspecified trigger  -     montelukast (SINGULAIR) 10 mg tablet; Take 1 tablet (10 mg total) by mouth daily at bedtime    8. Other gastritis without hemorrhage, unspecified chronicity  -     pantoprazole (PROTONIX) 40 mg tablet; Take 1 tablet (40 mg total) by mouth daily        Depression Screening and Follow-up Plan: Patient was screened for depression during today's encounter. They screened negative with a PHQ-2 score of 0.        Subjective      Chief Complaint  Routine follow-up for chronic conditions and  physical.    History of Present Illness  68-year-old female with a history of hypertension, diabetes, and hyperlipidemia presenting for routine yearly physical. No new complaints or changes in symptoms since last visit. Patient reports adherence to medications and diet.    Medications    Current Outpatient Medications:   ·  albuterol (2.5 mg/3 mL) 0.083 % nebulizer solution, USE 1 AMPULE VIA NEBULIZER EVERY 6 HOURS AS NEEDED FOR WHEEZING OR SHORTNESS OF BREATH, Disp: 300 mL, Rfl: 0  ·  calcium carbonate-vitamin D (OSCAL-D) 500 mg-200 units per tablet, Take 1 tablet by mouth daily with breakfast, Disp: 60 tablet, Rfl: 5  ·  cetirizine (ZyrTEC) 10 mg tablet, Take 1 tablet (10 mg total) by mouth daily, Disp: 30 tablet, Rfl: 0  ·  cholecalciferol (VITAMIN D3) 1,000 units tablet, Take 1 tablet (1,000 Units total) by mouth daily, Disp: 30 tablet, Rfl: 5  ·  fluticasone (FLONASE) 50 mcg/act nasal spray, 2 sprays into each nostril daily as needed for rhinitis, Disp: 16 g, Rfl: 1  ·  Fluticasone-Salmeterol (Advair Diskus) 100-50 mcg/dose inhaler, Inhale 1 puff 2 (two) times a day Rinse mouth after use., Disp: 60 blister, Rfl: 1  ·  hydrochlorothiazide (HYDRODIURIL) 25 mg tablet, Take 1 tablet (25 mg total) by mouth daily, Disp: 90 tablet, Rfl: 0  ·  Lancets (OneTouch Delica Plus [id number]) MISC, Test 1 time a day, Disp: 100 each, Rfl: 0  ·  levothyroxine 50 mcg tablet, Take 1 tablet (50 mcg total) by mouth daily, Disp: 90 tablet, Rfl: 3  ·  lidocaine (XYLOCAINE) 5 % ointment, Apply topically as needed for mild pain, Disp: 240 g, Rfl: 2  ·  montelukast (SINGULAIR) 10 mg tablet, Take 1 tablet (10 mg total) by mouth daily at bedtime, Disp: 90 tablet, Rfl: 1  ·  Multiple Vitamins-Minerals (MULTIVITAMIN ADULT PO), 1 tablet every 24 hours, Disp: , Rfl:   ·  olmesartan (BENICAR) 40 mg tablet, Take 1 tablet (40 mg total) by mouth daily, Disp: 90 tablet, Rfl: 3  ·  pantoprazole (PROTONIX) 40 mg tablet, Take 1 tablet (40 mg total) by  mouth daily, Disp: 30 tablet, Rfl: 2  ·  rosuvastatin (CRESTOR) 5 mg tablet, Take 1 tablet (5 mg total) by mouth daily, Disp: 90 tablet, Rfl: 3  ·  tirzepatide 5 MG/0.5ML, Inject 0.5 mL (5 mg total) under the skin every 7 days, Disp: 2 mL, Rfl: 2  ·  Ventolin  (90 Base) MCG/ACT inhaler, Inhale 2 puffs 4 (four) times a day, Disp: 18 g, Rfl: 5  ·  glucose blood test strip, Checks blood glucose 3 times a day., Disp: 100 each, Rfl: 3         -- Seasonal Ic [Octacosanol] -- Allergic Rhinitis    Past Medical History  Hypertension, Type 2 Diabetes Mellitus, Hyperlipidemia.    Past Surgical History  Cholecystectomy in 2005.    Social History  Non-smoker, occasional alcohol use, retired from  [employer].    Family History  Mother with type 2 diabetes, father with history of stroke.    Review of Systems  Negative for chest pain, shortness of breath, abdominal pain, polyuria, polydipsia, and polyphagia.    Vital Signs  /86 mmHg, HR 78 bpm, RR 16/min, Temp 98.6°F, BMI 29 kg/m2.     mg/dL, HDL 45 mg/dL, Triglycerides 150 mg/dL.              Review of Systems    Current Outpatient Medications on File Prior to Visit   Medication Sig    albuterol (2.5 mg/3 mL) 0.083 % nebulizer solution USE 1 AMPULE VIA NEBULIZER EVERY 6 HOURS AS NEEDED FOR WHEEZING OR SHORTNESS OF BREATH    calcium carbonate-vitamin D (OSCAL-D) 500 mg-200 units per tablet Take 1 tablet by mouth daily with breakfast    cetirizine (ZyrTEC) 10 mg tablet Take 1 tablet (10 mg total) by mouth daily    cholecalciferol (VITAMIN D3) 1,000 units tablet Take 1 tablet (1,000 Units total) by mouth daily    fluticasone (FLONASE) 50 mcg/act nasal spray 2 sprays into each nostril daily as needed for rhinitis    Lancets (OneTouch Delica Plus [id number]) MISC Test 1 time a day    lidocaine (XYLOCAINE) 5 % ointment Apply topically as needed for mild pain    Multiple Vitamins-Minerals (MULTIVITAMIN ADULT PO) 1 tablet every 24 hours    Ventolin  (90 Base) MCG/ACT  "inhaler Inhale 2 puffs 4 (four) times a day    glucose blood test strip Checks blood glucose 3 times a day.       Objective     /70 (BP Location: Left arm, Patient Position: Sitting, Cuff Size: Standard)   Pulse 62   Temp 98 °F (36.7 °C) (Tympanic)   Resp 16   Ht 5' 2\" (1.575 m)   Wt 101 kg (223 lb)   SpO2 98%   BMI 40.79 kg/m²     Physical Exam  Constitutional:       Appearance: Normal appearance.   Cardiovascular:      Pulses: no weak pulses          Dorsalis pedis pulses are 2+ on the right side and 2+ on the left side.        Posterior tibial pulses are 2+ on the right side and 2+ on the left side.   Musculoskeletal:        Feet:    Feet:      Right foot:      Skin integrity: Dry skin present. No ulcer, skin breakdown, erythema, warmth or callus.      Left foot:      Skin integrity: Dry skin present. No ulcer, skin breakdown, erythema, warmth or callus.   Neurological:      Mental Status: She is alert.     Patient's shoes and socks removed.    Right Foot/Ankle   Right Foot Inspection  Skin Exam: skin normal, skin intact and dry skin. No warmth, no callus, no erythema, no maceration, no abnormal color, no pre-ulcer, no ulcer and no callus.     Toe Exam: ROM and strength within normal limits. No swelling, no tenderness, erythema and  no right toe deformity    Sensory   Vibration: intact  Proprioception: intact  Monofilament testing: intact    Vascular  Capillary refills: < 3 seconds  The right DP pulse is 2+. The right PT pulse is 2+.     Left Foot/Ankle  Left Foot Inspection  Skin Exam: skin normal, skin intact and dry skin. No warmth, no erythema, no maceration, normal color, no pre-ulcer, no ulcer and no callus.     Toe Exam: ROM and strength within normal limits. No swelling, no tenderness, no erythema and no left toe deformity.     Sensory   Vibration: intact  Proprioception: intact  Monofilament testing: intact    Vascular  Capillary refills: < 3 seconds  The left DP pulse is 2+. The left PT " pulse is 2+.     Assign Risk Category  No deformity present  No loss of protective sensation  No weak pulses  Risk: 0       Physical Exam  General: Well-appearing, alert, and oriented x3. Obese Body mass index is 40.79 kg/m².  Cardiovascular: Regular rate and rhythm, no murmurs. Lungs: Clear to auscultation bilaterally. Abdomen: Soft, non-tender, non-distended, no hepatosplenomegaly. Extremities: No edema.    Lab Results  Recent labs show HbA1c 7.2%,   Ba Jason MD

## 2024-01-03 ENCOUNTER — ANNUAL EXAM (OUTPATIENT)
Dept: OBGYN CLINIC | Facility: CLINIC | Age: 69
End: 2024-01-03

## 2024-01-03 VITALS
SYSTOLIC BLOOD PRESSURE: 120 MMHG | HEART RATE: 65 BPM | BODY MASS INDEX: 41.15 KG/M2 | WEIGHT: 223.6 LBS | DIASTOLIC BLOOD PRESSURE: 77 MMHG | HEIGHT: 62 IN

## 2024-01-03 DIAGNOSIS — Z01.419 ROUTINE GYNECOLOGICAL EXAMINATION: Primary | ICD-10-CM

## 2024-01-03 PROCEDURE — S0612 ANNUAL GYNECOLOGICAL EXAMINA: HCPCS | Performed by: OBSTETRICS & GYNECOLOGY

## 2024-01-03 NOTE — PROGRESS NOTES
ANNUAL GYNECOLOGICAL EXAMINATION    Wanda Schaffer is a 68 y.o. female who presents today for annual GYN exam.  Her last pap smear was performed many years alog and result was negative.  She reports no history of abnormal pap smears in her past.  Her last mammogram was performed 23 and result was BI-RADS 3.  She had colon cancer screening performed 10/20/23.  She had HIV screening performed many years ago and it was negative.  No LMP recorded. Patient has had a hysterectomy.  Her general medical history has been reviewed and she reports it as follows:    Past Medical History:   Diagnosis Date    Asthma     Colon polyp     Diabetes mellitus (HCC)     Disease of thyroid gland     GERD (gastroesophageal reflux disease)     Hyperlipidemia     Hypertension     Obesity     Pre-diabetes     Seasonal allergies     Thyroid disease     Tinnitus      Past Surgical History:   Procedure Laterality Date     SECTION      Triplets    COLONOSCOPY      COLONOSCOPY W/ POLYPECTOMY  2020    repeat     HYSTERECTOMY      age 49    REDUCTION MAMMAPLASTY       OB History          3    Para   3    Term   3            AB        Living             SAB        IAB        Ectopic        Multiple        Live Births   4               Social History     Tobacco Use    Smoking status: Former     Current packs/day: 0.00     Average packs/day: 0.5 packs/day for 38.2 years (19.1 ttl pk-yrs)     Types: Cigarettes     Start date:      Quit date: 3/18/2010     Years since quittin.8    Smokeless tobacco: Never   Vaping Use    Vaping status: Never Used   Substance Use Topics    Alcohol use: Yes     Alcohol/week: 4.0 standard drinks of alcohol     Types: 4 Glasses of wine per week     Comment: social    Drug use: No     Social History     Substance and Sexual Activity   Sexual Activity Not Currently    Birth control/protection: None     Cancer-related family history includes Breast cancer (age of onset: 58) in her  "maternal aunt.    Current Outpatient Medications   Medication Instructions    albuterol (2.5 mg/3 mL) 0.083 % nebulizer solution USE 1 AMPULE VIA NEBULIZER EVERY 6 HOURS AS NEEDED FOR WHEEZING OR SHORTNESS OF BREATH    calcium carbonate-vitamin D (OSCAL-D) 500 mg-200 units per tablet 1 tablet, Oral, Daily with breakfast    cetirizine (ZYRTEC) 10 mg, Oral, Daily    cholecalciferol (VITAMIN D3) 1,000 Units, Oral, Daily    fluticasone (FLONASE) 50 mcg/act nasal spray 2 sprays, Nasal, Daily PRN    Fluticasone-Salmeterol (Advair Diskus) 100-50 mcg/dose inhaler 1 puff, Inhalation, 2 times daily, Rinse mouth after use.    glucose blood test strip Checks blood glucose 3 times a day.    hydrochlorothiazide (HYDRODIURIL) 25 mg, Oral, Daily    Lancets (OneTouch Delica Plus [id number]) MISC Test 1 time a day    levothyroxine 50 mcg, Oral, Daily    lidocaine (XYLOCAINE) 5 % ointment Topical, As needed    montelukast (SINGULAIR) 10 mg, Oral, Daily at bedtime    Multiple Vitamins-Minerals (MULTIVITAMIN ADULT PO) 1 tablet, Every 24 hours    olmesartan (BENICAR) 40 mg, Oral, Daily    pantoprazole (PROTONIX) 40 mg, Oral, Daily    rosuvastatin (CRESTOR) 5 mg, Oral, Daily    tirzepatide 5 mg, Subcutaneous, Every 7 days    Ventolin  (90 Base) MCG/ACT inhaler 2 puffs, Inhalation, 4 times daily       Review of Systems:  Review of Systems   All other systems reviewed and are negative.      Physical Exam:  /77   Pulse 65   Ht 5' 2\" (1.575 m)   Wt 101 kg (223 lb 9.6 oz)   BMI 40.90 kg/m²   Physical Exam  Constitutional:       Appearance: Normal appearance.   Genitourinary:      Bladder and urethral meatus normal.      No lesions in the vagina.      Right Labia: No rash, tenderness, lesions or skin changes.     Left Labia: No tenderness, lesions, skin changes or rash.     No inguinal adenopathy present in the right or left side.     Vaginal cuff intact.     No vaginal discharge.      Moderate vaginal atrophy present.       " Right Adnexa: not tender, not full and no mass present.     Left Adnexa: not tender, not full and no mass present.     Cervix is not absent.      Uterus is not absent.     No urethral tenderness or mass present.   Breasts:     Breasts are soft.     Right: No swelling, bleeding, inverted nipple, mass, nipple discharge, skin change or tenderness.      Left: No swelling, bleeding, inverted nipple, mass, nipple discharge, skin change or tenderness.      Breast exam comments: Scars noted bilaterally from breast reduction surgery.  HENT:      Head: Normocephalic and atraumatic.   Cardiovascular:      Rate and Rhythm: Normal rate and regular rhythm.   Pulmonary:      Effort: Pulmonary effort is normal.      Breath sounds: Normal breath sounds.   Abdominal:      General: Bowel sounds are normal.      Palpations: Abdomen is soft.      Hernia: There is no hernia in the left inguinal area or right inguinal area.   Musculoskeletal:         General: Normal range of motion.      Cervical back: Normal range of motion.   Lymphadenopathy:      Upper Body:      Right upper body: No supraclavicular or axillary adenopathy.      Left upper body: No supraclavicular or axillary adenopathy.      Lower Body: No right inguinal adenopathy. No left inguinal adenopathy.   Neurological:      Mental Status: She is alert and oriented to person, place, and time.   Skin:     General: Skin is warm and dry.   Psychiatric:         Mood and Affect: Mood normal.   Vitals reviewed.         Assessment/Plan:   1. Normal well-woman GYN exam.  2.  Has not received HPV vaccine in the past.      3. STD screening:  Patient declines.     4. Breast cancer screening:  Normal breast exam.   Reviewed breast self-awareness.   5. Colon cancer screening:  Up to date.   6. Depression Screening: Patient's depression screening was assessed with a PHQ-2 score of 0. Their PHQ-9 score was 0. Clinically patient does not have depression. No treatment is required.   7. BMI  Counseling: Body mass index is 40.9 kg/m². Discussed the patient's BMI with her. The BMI is above normal. Nutrition recommendations include decreasing overall calorie intake.   8. Contraception:  Hysterectomy   9. Return to office 1yr/prn.    Reviewed with patient that [facility], but that they will not necessarily be reviewed by me immediately.  Explained that I will review results at my earliest opportunity and contact patient appropriately.

## 2024-01-09 ENCOUNTER — TELEPHONE (OUTPATIENT)
Dept: FAMILY MEDICINE CLINIC | Facility: CLINIC | Age: 69
End: 2024-01-09

## 2024-01-10 DIAGNOSIS — I10 HYPERTENSION, UNSPECIFIED TYPE: ICD-10-CM

## 2024-01-10 DIAGNOSIS — E11.65 TYPE 2 DIABETES MELLITUS WITH HYPERGLYCEMIA, WITHOUT LONG-TERM CURRENT USE OF INSULIN (HCC): ICD-10-CM

## 2024-01-10 RX ORDER — OLMESARTAN MEDOXOMIL 40 MG/1
40 TABLET ORAL DAILY
Qty: 90 TABLET | Refills: 3 | Status: SHIPPED | OUTPATIENT
Start: 2024-01-10

## 2024-02-05 ENCOUNTER — OFFICE VISIT (OUTPATIENT)
Dept: DENTISTRY | Facility: CLINIC | Age: 69
End: 2024-02-05

## 2024-02-05 VITALS — SYSTOLIC BLOOD PRESSURE: 136 MMHG | HEART RATE: 79 BPM | DIASTOLIC BLOOD PRESSURE: 79 MMHG | TEMPERATURE: 97.9 F

## 2024-02-05 DIAGNOSIS — Z01.20 ENCOUNTER FOR DENTAL EXAMINATION: Primary | ICD-10-CM

## 2024-02-05 PROCEDURE — D0120 PERIODIC ORAL EVALUATION - ESTABLISHED PATIENT: HCPCS | Performed by: DENTIST

## 2024-02-05 PROCEDURE — D1110 PROPHYLAXIS - ADULT: HCPCS | Performed by: DENTAL HYGIENIST

## 2024-02-05 NOTE — DENTAL PROCEDURE DETAILS
Wanda Schaffer presents for a Periodic exam. Verbal consent for treatment given in addition to the forms.     Reviewed health history - Patient is ASA II  Consents signed: Yes     Perio: Generalized and Recession  Pain Scale: 0  Caries Assessment: Medium  Radiographs: None  EO/IO/OCS:  WNL     Oral Hygiene instruction reviewed and given.  OHI:  Good  ---Lt plaque and calc  ---Cavitron, handscaled, polish, floss  Recommended Hygiene recall visits with Wanda.     Treatment Plan:  1.  6mrc w/ BWs   2.  Caries control: Watch areas as charted  ---Pt still having discomfort when chewing on #29.  May need RCT in future - will watch for now.  ---Pt interested in implants to replace posterior teeth.  3.  Occlusal evaluation:  Missing posterior teeth    Prognosis is Good.  Referrals needed: No  Exam:  Dr. Chávez    NV1:  6mrc w/ Bws - 50 min

## 2024-02-06 ENCOUNTER — HOSPITAL ENCOUNTER (OUTPATIENT)
Dept: MAMMOGRAPHY | Facility: CLINIC | Age: 69
Discharge: HOME | End: 2024-02-06
Payer: COMMERCIAL

## 2024-02-06 VITALS — HEIGHT: 62 IN | WEIGHT: 223 LBS | BODY MASS INDEX: 41.04 KG/M2

## 2024-02-06 DIAGNOSIS — R92.8 ABNORMAL MAMMOGRAM: ICD-10-CM

## 2024-02-06 PROCEDURE — 77065 DX MAMMO INCL CAD UNI: CPT

## 2024-02-06 PROCEDURE — 76642 ULTRASOUND BREAST LIMITED: CPT

## 2024-02-06 PROCEDURE — G0279 TOMOSYNTHESIS, MAMMO: HCPCS

## 2024-02-19 ENCOUNTER — APPOINTMENT (OUTPATIENT)
Dept: LAB | Facility: HOSPITAL | Age: 69
End: 2024-02-19
Payer: COMMERCIAL

## 2024-02-19 DIAGNOSIS — Z00.01 ENCOUNTER FOR GENERAL ADULT MEDICAL EXAMINATION WITH ABNORMAL FINDINGS: ICD-10-CM

## 2024-02-19 DIAGNOSIS — E03.9 HYPOTHYROIDISM (ACQUIRED): ICD-10-CM

## 2024-02-19 LAB
CHOLEST SERPL-MCNC: 151 MG/DL
HDLC SERPL-MCNC: 47 MG/DL
LDLC SERPL CALC-MCNC: 84 MG/DL (ref 0–100)
TRIGL SERPL-MCNC: 99 MG/DL
TSH SERPL DL<=0.05 MIU/L-ACNC: 1.8 UIU/ML (ref 0.45–4.5)

## 2024-02-19 PROCEDURE — 80061 LIPID PANEL: CPT

## 2024-02-19 PROCEDURE — 36415 COLL VENOUS BLD VENIPUNCTURE: CPT

## 2024-02-19 PROCEDURE — 84443 ASSAY THYROID STIM HORMONE: CPT

## 2024-02-23 DIAGNOSIS — Z11.1 SCREENING EXAMINATION FOR PULMONARY TUBERCULOSIS: Primary | ICD-10-CM

## 2024-03-05 DIAGNOSIS — I10 HYPERTENSION, UNSPECIFIED TYPE: ICD-10-CM

## 2024-03-05 RX ORDER — HYDROCHLOROTHIAZIDE 25 MG/1
25 TABLET ORAL DAILY
Qty: 90 TABLET | Refills: 0 | Status: CANCELLED | OUTPATIENT
Start: 2024-03-05

## 2024-03-06 ENCOUNTER — APPOINTMENT (OUTPATIENT)
Dept: LAB | Facility: HOSPITAL | Age: 69
End: 2024-03-06

## 2024-03-06 DIAGNOSIS — Z11.1 SCREENING EXAMINATION FOR PULMONARY TUBERCULOSIS: ICD-10-CM

## 2024-03-06 PROCEDURE — 36415 COLL VENOUS BLD VENIPUNCTURE: CPT

## 2024-03-06 PROCEDURE — 86480 TB TEST CELL IMMUN MEASURE: CPT

## 2024-03-07 LAB
GAMMA INTERFERON BACKGROUND BLD IA-ACNC: 0.43 IU/ML
M TB IFN-G BLD-IMP: POSITIVE
M TB IFN-G CD4+ BCKGRND COR BLD-ACNC: 0.71 IU/ML
M TB IFN-G CD4+ BCKGRND COR BLD-ACNC: 0.77 IU/ML
MITOGEN IGNF BCKGRD COR BLD-ACNC: 9.57 IU/ML

## 2024-03-12 ENCOUNTER — OFFICE VISIT (OUTPATIENT)
Dept: FAMILY MEDICINE CLINIC | Facility: CLINIC | Age: 69
End: 2024-03-12
Payer: COMMERCIAL

## 2024-03-12 VITALS
WEIGHT: 216 LBS | DIASTOLIC BLOOD PRESSURE: 80 MMHG | RESPIRATION RATE: 16 BRPM | OXYGEN SATURATION: 98 % | BODY MASS INDEX: 39.75 KG/M2 | HEIGHT: 62 IN | HEART RATE: 78 BPM | SYSTOLIC BLOOD PRESSURE: 130 MMHG | TEMPERATURE: 98.6 F

## 2024-03-12 DIAGNOSIS — I10 PRIMARY HYPERTENSION: ICD-10-CM

## 2024-03-12 DIAGNOSIS — M25.561 CHRONIC PAIN OF RIGHT KNEE: Primary | ICD-10-CM

## 2024-03-12 DIAGNOSIS — Z22.7 LATENT TUBERCULOSIS: ICD-10-CM

## 2024-03-12 DIAGNOSIS — G89.29 CHRONIC PAIN OF RIGHT KNEE: Primary | ICD-10-CM

## 2024-03-12 DIAGNOSIS — E11.65 TYPE 2 DIABETES MELLITUS WITH HYPERGLYCEMIA, WITHOUT LONG-TERM CURRENT USE OF INSULIN (HCC): ICD-10-CM

## 2024-03-12 PROCEDURE — 99214 OFFICE O/P EST MOD 30 MIN: CPT | Performed by: FAMILY MEDICINE

## 2024-03-12 PROCEDURE — 96372 THER/PROPH/DIAG INJ SC/IM: CPT | Performed by: FAMILY MEDICINE

## 2024-03-12 RX ORDER — TRIAMCINOLONE ACETONIDE 40 MG/ML
40 INJECTION, SUSPENSION INTRA-ARTICULAR; INTRAMUSCULAR
Status: COMPLETED | OUTPATIENT
Start: 2024-03-12 | End: 2024-03-12

## 2024-03-12 RX ADMIN — TRIAMCINOLONE ACETONIDE 40 MG: 40 INJECTION, SUSPENSION INTRA-ARTICULAR; INTRAMUSCULAR at 13:40

## 2024-03-12 NOTE — PROGRESS NOTES
Name: Wanda Schaffer      : 1955      MRN: [id number]  Encounter Provider: Ba Jason MD  Encounter Date: 3/12/2024   Encounter department: [facility] PRIMARY CARE [facility]   Chief Complaint  Patient presents for routine follow-up of diabetes, hypertension, and hypothyroidism.    History of Present Illness  The patient reports overall feeling well with no new complaints. She mentions personal stress due to family matters, including the recent passing of her mother-in-law and responsibilities towards grandchildren. The patient also expresses concerns about the cost and availability of her diabetes medication, specifically mentioning issues with obtaining a coupon for her medication.      Current Outpatient Medications:     tirzepatide (Mounjaro) 7.5 MG/0.5ML, Inject 0.5 mL (7.5 mg total) under the skin every 7 days, Disp: 2 mL, Rfl: 0    albuterol (2.5 mg/3 mL) 0.083 % nebulizer solution, USE 1 AMPULE VIA NEBULIZER EVERY 6 HOURS AS NEEDED FOR WHEEZING OR SHORTNESS OF BREATH, Disp: 300 mL, Rfl: 0    calcium carbonate-vitamin D (OSCAL-D) 500 mg-200 units per tablet, Take 1 tablet by mouth daily with breakfast, Disp: 60 tablet, Rfl: 5    cetirizine (ZyrTEC) 10 mg tablet, Take 1 tablet (10 mg total) by mouth daily, Disp: 30 tablet, Rfl: 0    cholecalciferol (VITAMIN D3) 1,000 units tablet, Take 1 tablet (1,000 Units total) by mouth daily, Disp: 30 tablet, Rfl: 5    fluticasone (FLONASE) 50 mcg/act nasal spray, 2 sprays into each nostril daily as needed for rhinitis, Disp: 16 g, Rfl: 1    Fluticasone-Salmeterol (Advair Diskus) 100-50 mcg/dose inhaler, Inhale 1 puff 2 (two) times a day Rinse mouth after use., Disp: 60 blister, Rfl: 1    glucose blood test strip, Checks blood glucose 3 times a day., Disp: 100 each, Rfl: 3    hydrochlorothiazide (HYDRODIURIL) 25 mg tablet, Take 1 tablet (25 mg total) by mouth daily, Disp: 90 tablet, Rfl: 0    Lancets (OneTouch Delica Plus  "[id number]) MISC, Test 1 time a day, Disp: 100 each, Rfl: 0    levothyroxine 50 mcg tablet, Take 1 tablet (50 mcg total) by mouth daily, Disp: 90 tablet, Rfl: 3    lidocaine (XYLOCAINE) 5 % ointment, Apply topically as needed for mild pain, Disp: 240 g, Rfl: 2    montelukast (SINGULAIR) 10 mg tablet, Take 1 tablet (10 mg total) by mouth daily at bedtime, Disp: 90 tablet, Rfl: 1    Multiple Vitamins-Minerals (MULTIVITAMIN ADULT PO), 1 tablet every 24 hours, Disp: , Rfl:     olmesartan (BENICAR) 40 mg tablet, Take 1 tablet (40 mg total) by mouth daily, Disp: 90 tablet, Rfl: 3    pantoprazole (PROTONIX) 40 mg tablet, Take 1 tablet (40 mg total) by mouth daily, Disp: 30 tablet, Rfl: 2    rosuvastatin (CRESTOR) 5 mg tablet, Take 1 tablet (5 mg total) by mouth daily, Disp: 90 tablet, Rfl: 3    Ventolin  (90 Base) MCG/ACT inhaler, Inhale 2 puffs 4 (four) times a day, Disp: 18 g, Rfl: 5    Allergies   Allergen Reactions    Seasonal Ic [Octacosanol] Allergic Rhinitis           Past Medical History  Diabetes Mellitus Type 2  Hypertension  Hypothyroidism  History of positive PPD with completed treatment.    Past Surgical History  No surgical history provided.    Social History  Patient is retired and actively involved in caring for grandchildren. She also mentions home renovation activities.    Family History  No new family history provided.    Review of Systems  The patient did not report any specific symptoms during the review of systems.    Vital Signs  /80 (BP Location: Left arm, Patient Position: Sitting, Cuff Size: Standard)   Pulse 78   Temp 98.6 °F (37 °C) (Tympanic)   Resp 16   Ht 5' 2\" (1.575 m)   Wt 98 kg (216 lb)   SpO2 98%   BMI 39.51 kg/m²       Physical Exam  She looks in non distress; oriented x 3 Person, Place, and Day, HEENT unremarkable.  Lungs are clear. Heart is having Normal rhythm w/o murmurs.  Abdomen is soft, non tender. MS: Muscle strength and tone were normal. Right knee " tenderness.  Neurological system has no deficit and walks normal.      Lab Results  Hemoglobin A1c (last measured on December 19, 2023): 6.4%.    Imaging and other Relevant Results  PPD test positive as of March 7, reported as previously treated.  Last TSH normal on February 19th.    Assessment and Plan  Diabetes Mellitus Type 2: The patient's glycemic control appears to be adequate with a recent A1c of 6.4%. Address the patient's concerns regarding medication costs and ensure continuity of medication supply. Consider alternative options if the patient is unable to obtain her medication due to cost or coupon issues.  Hypertension: Blood pressure is well-controlled at 130/80 mmHg. Continue current management and monitor blood pressure regularly.  Hypothyroidism: Last TSH was within normal limits. Continue current thyroid hormone replacement therapy and monitor as per standard guidelines.  Latent Tuberculosis treated in 2005.: As the patient has completed treatment for latent tuberculosis infection, no further testing is required. Document the treatment completion in the medical record to prevent unnecessary future testing.  Medication Cost and Access: Work with the patient to address the financial and logistical barriers to obtaining her diabetes medication. Provide assistance with coupon registration if needed and explore patient assistance programs.  Right knee pain: injected as bellow.  Large joint arthrocentesis  Universal Protocol:  Consent: Verbal consent obtained.  Consent given by: patient  Timeout called at: 3/12/2024 2:11 PM.  Patient understanding: patient states understanding of the procedure being performed  Patient consent: the patient's understanding of the procedure matches consent given  Patient identity confirmed: verbally with patient  Procedure Details  Needle size: 25 G  Ultrasound guidance: no  Approach: medial  Medications administered: 40 mg triamcinolone acetonide 40 mg/mL    Patient  tolerance: patient tolerated the procedure well with no immediate complications  Dressing:  Sterile dressing applied          Ba Jason MD   [facility]

## 2024-04-01 ENCOUNTER — HOSPITAL ENCOUNTER (OUTPATIENT)
Dept: RADIOLOGY | Facility: HOSPITAL | Age: 69
Discharge: HOME | End: 2024-04-01
Payer: COMMERCIAL

## 2024-04-01 ENCOUNTER — HOSPITAL ENCOUNTER (OUTPATIENT)
Dept: PULMONOLOGY | Facility: HOSPITAL | Age: 69
Discharge: HOME | End: 2024-04-01
Payer: COMMERCIAL

## 2024-04-01 DIAGNOSIS — J84.9 INTERSTITIAL LUNG DISEASE (HCC): ICD-10-CM

## 2024-04-01 PROCEDURE — 94618 PULMONARY STRESS TESTING: CPT

## 2024-04-01 PROCEDURE — 94729 DIFFUSING CAPACITY: CPT

## 2024-04-01 PROCEDURE — 94060 EVALUATION OF WHEEZING: CPT

## 2024-04-01 PROCEDURE — 71250 CT THORAX DX C-: CPT

## 2024-04-01 PROCEDURE — 94618 PULMONARY STRESS TESTING: CPT | Performed by: INTERNAL MEDICINE

## 2024-04-01 PROCEDURE — 94060 EVALUATION OF WHEEZING: CPT | Performed by: INTERNAL MEDICINE

## 2024-04-01 PROCEDURE — 94726 PLETHYSMOGRAPHY LUNG VOLUMES: CPT

## 2024-04-01 PROCEDURE — G1004 CDSM NDSC: HCPCS

## 2024-04-01 PROCEDURE — 94729 DIFFUSING CAPACITY: CPT | Performed by: INTERNAL MEDICINE

## 2024-04-01 PROCEDURE — 94726 PLETHYSMOGRAPHY LUNG VOLUMES: CPT | Performed by: INTERNAL MEDICINE

## 2024-04-01 RX ORDER — ALBUTEROL SULFATE 2.5 MG/3ML
2.5 SOLUTION RESPIRATORY (INHALATION) ONCE
Status: COMPLETED | OUTPATIENT
Start: 2024-04-01 | End: 2024-04-01

## 2024-04-01 RX ADMIN — ALBUTEROL SULFATE 2.5 MG: 2.5 SOLUTION RESPIRATORY (INHALATION) at 13:22

## 2024-04-02 ENCOUNTER — OFFICE VISIT (OUTPATIENT)
Dept: CARDIOLOGY CLINIC | Facility: CLINIC | Age: 69
End: 2024-04-02
Payer: COMMERCIAL

## 2024-04-02 VITALS
SYSTOLIC BLOOD PRESSURE: 115 MMHG | DIASTOLIC BLOOD PRESSURE: 64 MMHG | WEIGHT: 211.2 LBS | HEART RATE: 63 BPM | BODY MASS INDEX: 38.63 KG/M2

## 2024-04-02 DIAGNOSIS — I10 PRIMARY HYPERTENSION: Primary | ICD-10-CM

## 2024-04-02 DIAGNOSIS — E11.65 TYPE 2 DIABETES MELLITUS WITH HYPERGLYCEMIA, WITHOUT LONG-TERM CURRENT USE OF INSULIN (HCC): ICD-10-CM

## 2024-04-02 DIAGNOSIS — E66.01 MORBID OBESITY WITH BMI OF 40.0-44.9, ADULT (HCC): ICD-10-CM

## 2024-04-02 DIAGNOSIS — I28.8 ENLARGED PULMONARY ARTERY (HCC): ICD-10-CM

## 2024-04-02 DIAGNOSIS — E78.2 MIXED HYPERLIPIDEMIA: ICD-10-CM

## 2024-04-02 DIAGNOSIS — E03.9 HYPOTHYROIDISM (ACQUIRED): ICD-10-CM

## 2024-04-02 PROCEDURE — 93000 ELECTROCARDIOGRAM COMPLETE: CPT | Performed by: INTERNAL MEDICINE

## 2024-04-02 PROCEDURE — 99214 OFFICE O/P EST MOD 30 MIN: CPT | Performed by: INTERNAL MEDICINE

## 2024-04-02 RX ORDER — LEVOTHYROXINE SODIUM 0.05 MG/1
50 TABLET ORAL DAILY
Qty: 90 TABLET | Refills: 3 | Status: SHIPPED | OUTPATIENT
Start: 2024-04-02

## 2024-04-02 NOTE — PROGRESS NOTES
CARDIOLOGY ASSOCIATES  [facility]  Primary Office: [address] [place] [id number]  Phone: [phone]; Fax: [phone]  *-*-*-*-*-*-*-*-*-*-*-*-*-*-*-*-*-*-*-*-*-*-*-*-*-*-*-*-*-*-*-*-*-*-*-*-*-*-*-*-*-*-*-*-*-*-*-*-*-*-*-*-*-*  ENCOUNTER DATE: 04/02/24 11:47 AM  PATIENT NAME: Wanda Schaffer   1955    [id number]  AGE:69 y.o.      SEX: female  ENCOUNTER PROVIDER: Ty Anderson MD, [facility], [facility]  PRIMARY CARE PHYSICIAN: Ba Jason MD    DIAGNOSES:  1. Diastolic dysfunction without diastolic CHF.  2. Mixed dyslipidemia  3. Asthma  4. Hypothyroidism  5. Benign essential hypertension  6.  Class II obesity  7.  Suspected sleep apnea  8.  Mild interstitial lung disease    ECHOCARDIOGRAM 8/5/2022: Normal left ventricle size, mildly increased work stress, normal left ventricle systolic function, normal diastolic function, normal right ventricle size and function, normal left and right atrial size, normal aortic valve without stenosis or regurgitation, normal mitral valve, trace mitral valve regurgitation, trace tricuspid valve regurgitation, no obvious pulmonary hypertension, no pericardial effusion, mildly dilated ascending aorta measuring 3.4 cm, normal for vena cava and appropriate respirophasic changes in diameter    ECHOCARDIOGRAM May 15, 2019:  Normal left ventricular size, mild concentric LVH, normal left ventricular systolic function, EF of around 63%, normal diastolic function, normal left atrial pressure, aortic valve sclerosis, no stenosis or regurgitation, no mitral valve regurgitation, trace tricuspid valve regurgitation, no obvious pulmonary hypertension and no pericardial effusion.    ECHOCARDIOGRAM January 2016 showed mildly prominent basal interventricular septum, normal left ventricular systolic function, EF around 60-65%, mild aortic valve sclerosis without stenosis, trace to mild mitral regurgitation, no pulmonary hypertension.       CURRENT ECG     Results  for orders placed or performed in visit on 04/02/24   POCT ECG    Narrative    Sinus rhythm, HR 63 bpm, normal axis and intervals, no significant chamber hypertrophy enlargement, no evidence of prior infarction, no ischemia.           CARDIOLOGY ASSESSMENT & PLAN      Diagnosis ICD-10-CM Associated Orders   1. Primary hypertension  I10 POCT ECG      2. 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 Type 2 diabetes mellitus with hyperglycemia, without long-term current use of insulin ([facility])  E11.65       4. Mixed hyperlipidemia  E78.2       5. Morbid obesity with BMI of 40.0-44.9, adult ([facility])  E66.01     Z68.41          Hypertension  Pressure is well-controlled on current regimen at olmesartan and HCTZ.  Has had no symptoms of heart failure.  Examination is benign.    -- She is advised to continue current medications.    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HCC)  Incidental finding of enlarged pulmonary artery.  No evidence of pulmonary hypertension on echocardiogram in 2022.  No symptoms that suggest decompensated heart failure or untreated obstructive sleep apnea.  -- Patient is encouraged to try to lose weight through dietary changes and physical activity.    Type 2 diabetes mellitus with hyperglycemia, without long-term current use of insulin ([facility])    Lab Results   Component Value Date    HGBA1C 6.4 12/19/2023   Has more or less prediabetes rather than diabetes.  Does follow closely with primary physician.    Mixed hyperlipidemia  Goal LDL level is less than 100.  LDL is good on current regimen with rosuvastatin 5 mg daily.  -- Will continue current medical regimen.    Morbid obesity with BMI of 40.0-44.9, adult ([facility])  -- Patient is encouraged to try to lose weight through dietary changes and physical activity.                 INTERVAL HISTORY, HISTORY OF PRESENT ILLNESS & COMPREHENSIVE VISIT INFORMATION     Wanda Schaffer is here for follow-up regarding her cardiac comorbidities which include: History of hypertension,  diastolic dysfunction and comorbidities.  She was last seen by me in May 2023. She mentions that since last year she has had no new diagnosis or hospitalizations or significant illnesses.  From a symptom perspective she reports that she has overall been feeling well.  She does get asthma exacerbations and now she is dealing with weather related upper respiratory symptoms.  She denies any unusual chest pain or shortness of breath with normal activities or palpitations or passing out or near passing out episodes.  Denies presyncope/syncope or pedal edema or orthopnea or PND.  Mentions being compliant with her medications.    Current cardiac medications: HCTZ 25 mg daily, olmesartan 40 mg daily, rosuvastatin 5 mg daily    Most recent blood work from 3/20/2023 showed sodium 144 potassium 3.9 chloride 105 bicarb 31 BUN 19 creatinine 0.82 normal liver function test,   Lipid profile 2/19/2024 total cholesterol 151 triglyceride 99 HDL 47 calculated LDL 84    hemoglobin A1c 6.5 in December 2022.  TSH recently was 2.71    Last recent comprehensive blood work available:   Blood work 10/21/2023 sodium 139 potassium 3.5 chloride 100 bicarb 30 BUN 14 creatinine 0.78 GFR 84  Normal LFTs  Lipid profile 2/19/2024 total cholesterol 151 HDL 47 triglyceride 99 calculated LDL 84  Hemoglobin A1c 6.4 in December 2023  TSH 1.797 2/19/2024    The 10-year ASCVD risk score (Fay OROZCO, et al., 2019) is: 15.4%    Values used to calculate the score:      Age: 69 years      Sex: Female      Is Non- : Yes      Diabetic: Yes      Tobacco smoker: No      Systolic Blood Pressure: 115 mmHg      Is BP treated: Yes      HDL Cholesterol: 47 mg/dL      Total Cholesterol: 151 mg/dL  (Low risk: Less than <5%; borderline risk: ?5% to <7.5%; intermediate risk: ?7.5% to <20%; high risk:?20%)  Patient's ASCVD risk category: Intermediate risk    Primary prevention versus secondary prevention: Primary prevention  Goal LDL: Less than  100      REVIEW OF SYSTEMS   Positive for: As noted above in HPI  Negative for: All remaining as reviewed below and in HPI.    SYSTEM SYMPTOMS REVIEWED:  General--weight change, fever, night sweats  Respiratory--cough, wheezing, shortness of breath, sputum production  Cardiovascular--chest pain, syncope, dyspnea on exertion, edema, decline in exercise tolerance, claudication   Gastrointestinal--persistent vomiting, diarrhea, abdominal distention, blood in stool   Muscular or skeletal--joint pain or swelling   Neurologic--headaches, syncope, abnormal movement  Hematologic--history of easy bruising and bleeding   Endocrine--thyroid enlargement, heat or cold intolerance, polyuria   Psychiatric--anxiety, depression     *-*-*-*-*-*-*-*-*-*-*-*-*-*-*-*-*-*-*-*-*-*-*-*-*-*-*-*-*-*-*-*-*-*-*-*-*-*-*-*-*-*-*-*-*-*-*-*-*-*-*-*-*-*-  VITAL SIGNS     CURRENT VITAL SIGNS:   Vitals:    04/02/24 1121   BP: 115/64   BP Location: Left arm   Patient Position: Sitting   Cuff Size: Large   Pulse: 63   Weight: 95.8 kg (211 lb 3.2 oz)       BMI: Body mass index is 38.63 kg/m².    WEIGHTS:   Wt Readings from Last 25 Encounters:   04/02/24 95.8 kg (211 lb 3.2 oz)   03/12/24 98 kg (216 lb)   02/06/24 101 kg (223 lb)   01/03/24 101 kg (223 lb 9.6 oz)   12/19/23 101 kg (223 lb)   10/21/23 102 kg (224 lb 13.9 oz)   08/02/23 102 kg (224 lb)   07/12/23 102 kg (224 lb)   06/08/23 102 kg (224 lb 12.8 oz)   05/11/23 101 kg (222 lb 3.2 oz)   05/01/23 102 kg (224 lb)   01/11/23 102 kg (223 lb 14.4 oz)   12/28/22 104 kg (229 lb)   12/22/22 101 kg (222 lb 12.8 oz)   12/13/22 105 kg (231 lb 3.2 oz)   10/26/22 103 kg (228 lb)   08/05/22 106 kg (233 lb)   07/20/22 106 kg (233 lb)   06/09/22 103 kg (226 lb)   05/09/22 103 kg (226 lb)   04/08/22 98.9 kg (218 lb)   01/01/22 98.9 kg (218 lb)   12/27/21 99.2 kg (218 lb 11.1 oz)   12/02/21 99.3 kg (219 lb)   11/24/21 96.6 kg (213 lb)         *-*-*-*-*-*-*-*-*-*-*-*-*-*-*-*-*-*-*-*-*-*-*-*-*-*-*-*-*-*-*-*-*-*-*-*-*-*-*-*-*-*-*-*-*-*-*-*-*-*-*-*-*-*-  PHYSICAL EXAM     General Appearance:    Alert, cooperative, no distress, appears stated age, slightly obese   Head, Eyes, ENT:    No obvious abnormality, moist mucous mebranes.   Neck:   Supple, no carotid bruit. No JVD, lo obvious lymphadenoapthy   Back:     Symmetric, no curvature.   Lungs:     Respirations unlabored.  Mildly decreased breath sounds bilaterally without any wheeze or rhonchi or crackles.   Chest wall:    No tenderness or deformity   Heart:    Regular rate and rhythm, S1 and S2 normal, no murmur, rub  or gallop.   Abdomen:     Soft, non-tender, No obvious masses, or organomegaly, no abdominal bruit   Extremities:   Extremities warm, no cyanosis.  Trace lower extremity edema.   Skin: Mild varicosities and venostatic changes in lower extremities. Normal skin color, texture, and turgor. No rashes or lesions   Neuro: Pt is alert and oriented time 3 with no gross motor deficits.   Psychiatric/Behavioral: Mood is normal. Behavior is normal.     *-*-*-*-*-*-*-*-*-*-*-*-*-*-*-*-*-*-*-*-*-*-*-*-*-*-*-*-*-*-*-*-*-*-*-*-*-*-*-*-*-*-*-*-*-*-*-*-*-*-*-*-*-*-  CURRENT MEDICATIONS LIST     Current Outpatient Medications:     albuterol (2.5 mg/3 mL) 0.083 % nebulizer solution, USE 1 AMPULE VIA NEBULIZER EVERY 6 HOURS AS NEEDED FOR WHEEZING OR SHORTNESS OF BREATH, Disp: 300 mL, Rfl: 0    calcium carbonate-vitamin D (OSCAL-D) 500 mg-200 units per tablet, Take 1 tablet by mouth daily with breakfast, Disp: 60 tablet, Rfl: 5    cetirizine (ZyrTEC) 10 mg tablet, Take 1 tablet (10 mg total) by mouth daily, Disp: 30 tablet, Rfl: 0    cholecalciferol (VITAMIN D3) 1,000 units tablet, Take 1 tablet (1,000 Units total) by mouth daily, Disp: 30 tablet, Rfl: 5    fluticasone (FLONASE) 50 mcg/act nasal spray, 2 sprays into each nostril daily as needed for rhinitis, Disp: 16 g, Rfl: 1    Fluticasone-Salmeterol (Advair  "Diskus) 100-50 mcg/dose inhaler, Inhale 1 puff 2 (two) times a day Rinse mouth after use., Disp: 60 blister, Rfl: 1    glucose blood test strip, Checks blood glucose 3 times a day., Disp: 100 each, Rfl: 3    hydrochlorothiazide (HYDRODIURIL) 25 mg tablet, Take 1 tablet (25 mg total) by mouth daily, Disp: 90 tablet, Rfl: 0    Lancets (OneTouch Delica Plus [id number]) MISC, Test 1 time a day, Disp: 100 each, Rfl: 0    levothyroxine 50 mcg tablet, Take 1 tablet (50 mcg total) by mouth daily, Disp: 90 tablet, Rfl: 3    montelukast (SINGULAIR) 10 mg tablet, Take 1 tablet (10 mg total) by mouth daily at bedtime, Disp: 90 tablet, Rfl: 1    Multiple Vitamins-Minerals (MULTIVITAMIN ADULT PO), 1 tablet every 24 hours, Disp: , Rfl:     olmesartan (BENICAR) 40 mg tablet, Take 1 tablet (40 mg total) by mouth daily, Disp: 90 tablet, Rfl: 3    pantoprazole (PROTONIX) 40 mg tablet, Take 1 tablet (40 mg total) by mouth daily, Disp: 30 tablet, Rfl: 2    rosuvastatin (CRESTOR) 5 mg tablet, Take 1 tablet (5 mg total) by mouth daily, Disp: 90 tablet, Rfl: 3    tirzepatide (Mounjaro) 7.5 MG/0.5ML, Inject 0.5 mL (7.5 mg total) under the skin every 7 days, Disp: 2 mL, Rfl: 0    Ventolin  (90 Base) MCG/ACT inhaler, Inhale 2 puffs 4 (four) times a day, Disp: 18 g, Rfl: 5    lidocaine (XYLOCAINE) 5 % ointment, Apply topically as needed for mild pain (Patient not taking: Reported on 4/2/2024), Disp: 240 g, Rfl: 2  No current facility-administered medications for this visit.       ALLERGIES     Allergies   Allergen Reactions    Seasonal Ic [Octacosanol] Allergic Rhinitis       *-*-*-*-*-*-*-*-*-*-*-*-*-*-*-*-*-*-*-*-*-*-*-*-*-*-*-*-*-*-*-*-*-*-*-*-*-*-*-*-*-*-*-*-*-*-*-*-*-*-*-*-*-*-  LABORATORY DATA   No results found for: \"NA\"  Potassium   Date Value Ref Range Status   10/21/2023 3.5 3.5 - 5.3 mmol/L Final   03/20/2023 3.9 3.5 - 5.3 mmol/L Final   07/11/2022 4.0 3.6 - 5.0 mmol/L Final     Chloride   Date Value Ref Range Status "   10/21/2023 100 96 - 108 mmol/L Final   03/20/2023 105 96 - 108 mmol/L Final   07/11/2022 107 97 - 108 mmol/L Final     CO2   Date Value Ref Range Status   10/21/2023 30 21 - 32 mmol/L Final   03/20/2023 31 21 - 32 mmol/L Final   07/11/2022 30 22 - 30 mmol/L Final     BUN   Date Value Ref Range Status   10/21/2023 14 5 - 25 mg/dL Final   03/20/2023 19 5 - 25 mg/dL Final   07/11/2022 22 5 - 25 mg/dL Final     Creatinine   Date Value Ref Range Status   10/21/2023 0.74 0.60 - 1.30 mg/dL Final     Comment:     Standardized to IDMS reference method   03/20/2023 0.82 0.60 - 1.30 mg/dL Final     Comment:     Standardized to IDMS reference method   07/11/2022 0.64 0.60 - 1.20 mg/dL Final     Comment:     Standardized to IDMS reference method     eGFR   Date Value Ref Range Status   10/21/2023 84 ml/min/1.73sq m Final   03/20/2023 74 ml/min/1.73sq m Final   07/11/2022 93 ml/min/1.73sq m Final     Calcium   Date Value Ref Range Status   10/21/2023 9.1 8.4 - 10.2 mg/dL Final   03/20/2023 8.8 8.4 - 10.2 mg/dL Final   07/11/2022 8.9 8.4 - 10.2 mg/dL Final     AST   Date Value Ref Range Status   10/21/2023 19 13 - 39 U/L Final     Comment:     Slightly Hemolyzed:Results may be affected.   03/20/2023 17 13 - 39 U/L Final     Comment:     Specimen collection should occur prior to Sulfasalazine administration due to the potential for falsely depressed results.    02/11/2022 25 14 - 36 U/L Final     Comment:     Specimen collection should occur prior to Sulfasalazine administration due to the potential for falsely depressed results.      ALT   Date Value Ref Range Status   10/21/2023 14 7 - 52 U/L Final     Comment:     Specimen collection should occur prior to Sulfasalazine administration due to the potential for falsely depressed results.    03/20/2023 15 7 - 52 U/L Final     Comment:     Specimen collection should occur prior to Sulfasalazine administration due to the potential for falsely depressed results.    02/11/2022 17 <35  "U/L Final     Comment:     Specimen collection should occur prior to Sulfasalazine administration due to the potential for falsely depressed results.      Alkaline Phosphatase   Date Value Ref Range Status   10/21/2023 61 34 - 104 U/L Final   03/20/2023 54 34 - 104 U/L Final   02/11/2022 68 43 - 122 U/L Final     Magnesium   Date Value Ref Range Status   07/11/2022 2.1 1.6 - 2.3 mg/dL Final     WBC   Date Value Ref Range Status   10/21/2023 11.02 (H) 4.31 - 10.16 Thousand/uL Final   03/20/2023 8.65 4.31 - 10.16 Thousand/uL Final   07/11/2022 7.70 4.31 - 10.16 Thousand/uL Final     Hemoglobin   Date Value Ref Range Status   10/21/2023 14.5 11.5 - 15.4 g/dL Final   03/20/2023 14.2 11.5 - 15.4 g/dL Final   07/11/2022 14.0 11.5 - 15.4 g/dL Final     Platelets   Date Value Ref Range Status   10/21/2023 277 149 - 390 Thousands/uL Final   03/20/2023 275 149 - 390 Thousands/uL Final   07/11/2022 293 149 - 390 Thousands/uL Final     PTT   Date Value Ref Range Status   10/07/2020 32 23 - 37 seconds Final     Comment:     Therapeutic Heparin Range =  60-90 seconds     INR   Date Value Ref Range Status   10/07/2020 0.95 0.84 - 1.19 Final     No results found for: \"CK\", \"CKMB\", \"DIGOXIN\"  No results found for: \"TSH\"  No results found for: \"CHOL\"   Hemoglobin A1C   Date Value Ref Range Status   12/19/2023 6.4 6.5 Final   03/05/2021 6.0 (H) Normal 3.8-5.6%; PreDiabetic 5.7-6.4%; Diabetic >=6.5%; Glycemic control for adults with diabetes <7.0% % Final     No results found for: \"BLOODCX\", \"SPUTUMCULTUR\", \"GRAMSTAIN\", \"URINECX\", \"WOUNDCULT\", \"BODYFLUIDCUL\", \"MRSACULTURE\", \"INFLUAPCR\", \"INFLUBPCR\", \"RSVPCR\", \"LEGIONELLAUR\", \"CDIFFTOXINB\"    *-*-*-*-*-*-*-*-*-*-*-*-*-*-*-*-*-*-*-*-*-*-*-*-*-*-*-*-*-*-*-*-*-*-*-*-*-*-*-*-*-*-*-*-*-*-*-*-*-*-*-*-*-*-  PREVIOUS CARDIOLOGY & RADIOLOGY TEST RESULTS   I have personally reviewed pertinent results of cardiovascular tests noted below.    Results for orders placed during the hospital encounter " of 21    Echo complete with contrast if indicated    Narrative  [facility]  [address] [place] [address]  [phone]    Transthoracic Echocardiogram  2D, M-mode, Doppler, and Color Doppler    Study date:  31-Mar-2021    Patient: SCOT DIXON  MR number: [id number]  Account number: [id number]  : 1955  Age: 66 years  Gender: Female  Status: Outpatient  Location: [facility] Echo 3  Height: 62 in  Weight: 217.6 lb  BP: 108/ 58 mmHg    Indications: shortness of breath.    Diagnoses: R06.02 - Shortness of breath    Sonographer:  Kayode Brown RDCS  Primary Physician:  Ba Jason MD  Referring Physician:  Radha Sorenson MD  Group:  [facility]  Interpreting Physician:  Paul Doyle D.O.    SUMMARY    LEFT VENTRICLE:  Systolic function was normal by visual assessment. Ejection fraction was estimated to be 65 %.  There were no regional wall motion abnormalities.  Wall thickness was mildly increased.  Mass was increased. The changes were consistent with eccentric hypertrophy.    LEFT ATRIUM:  The atrium was mildly dilated. Left atrial volume index 35 milliliters/meter squared    ATRIAL SEPTUM:  The septum bows very mildly from left to right, consider increased left atrial pressure.    MITRAL VALVE:  There was trace regurgitation.    TRICUSPID VALVE:  There was trace regurgitation.  Estimated peak PA pressure was 22 mmHg.    PULMONIC VALVE:  There was trace regurgitation.    PERICARDIUM:  A small, physiologic, pericardial effusion was identified. There was no evidence of hemodynamic compromise.    SUMMARY MEASUREMENTS  2D measurements:  Unspecified Anatomy:   LAESV Index (A-L) was 36.2 ml/m2.  LAESVInd MOD BP was 35.4 ml/m2.  RAAs A4C was 14.8 cm2.  RWT was 0.4 .  MM measurements:  Unspecified Anatomy:   TAPSE was 3.8 cm.  PW measurements:  Unspecified Anatomy:   E' Avg was 0.1 m/s.  E' Lat was 0.1 m/s.  E' Sept was 0.1 m/s.  E/E'  Avg was 6.8 .  E/E' Lat was 6.3 .  E/E' Sept was 7.4 .  MV A David was 0.9 m/s.  MV Dec Bonner was 3.9 m/s2.  MV DecT was 184.2 ms.  MV E David was 0.7  m/s.  MV E/A Ratio was 0.8 .  TR Vmax was 0.2 m/s.  TR maxPG was 0.1 mmHg.    HISTORY: PRIOR HISTORY: GERD, asthma, hypothyroidism, hypertension, hyperlipidemia, diabetes mellitus, obesity.    PROCEDURE: The study was performed in the AL Echo 3. This was a routine study. The transthoracic approach was used. The study included complete 2D imaging, M-mode, complete spectral Doppler, and color Doppler. The heart rate was 64 bpm, at  the start of the study. Echocardiographic views were limited due to decreased penetration. Image quality was adequate.    LEFT VENTRICLE: Size was normal. Systolic function was normal by visual assessment. Ejection fraction was estimated to be 65 %. There were no regional wall motion abnormalities. Wall thickness was mildly increased. Mass was increased. The  changes were consistent with eccentric hypertrophy. DOPPLER: Left ventricular diastolic function parameters were normal.    RIGHT VENTRICLE: The size was normal. Systolic function was normal. TAPSE 3.8 cm. Wall thickness was normal.    LEFT ATRIUM: The atrium was mildly dilated. Left atrial volume index 35 milliliters/meter squared    ATRIAL SEPTUM: The septum bows very mildly from left to right, consider increased left atrial pressure.    RIGHT ATRIUM: Size was normal.    MITRAL VALVE: Valve structure was normal. There was normal leaflet separation. DOPPLER: The transmitral velocity was within the normal range. There was no evidence for stenosis. There was trace regurgitation.    TRICUSPID VALVE: The valve structure was normal. There was normal leaflet separation. DOPPLER: The transtricuspid velocity was within the normal range. There was no evidence for stenosis. There was trace regurgitation. Estimated peak PA  pressure was 22 mmHg.    PULMONIC VALVE: Leaflets exhibited normal thickness,  no calcification, and normal cuspal separation. DOPPLER: The transpulmonic velocity was within the normal range. There was trace regurgitation.    PERICARDIUM: A small, physiologic, pericardial effusion was identified. There was no evidence of hemodynamic compromise.    AORTA: The root exhibited upper limit of normal size.    SYSTEMIC VEINS: IVC: The inferior vena cava was normal in size and course. Respirophasic changes were normal.    SYSTEM MEASUREMENT TABLES    2D  %FS: 34.8 %  Ao Diam: 3.7 cm  EDV(Teich): 128.4 ml  EF(Teich): 63.6 %  ESV(Teich): 46.7 ml  IVSd: 1 cm  LA Diam: 3.7 cm  LAAs A2C: 20 cm2  LAAs A4C: 26.1 cm2  LAESV A-L A2C: 51.1 ml  LAESV A-L A4C: 93.5 ml  LAESV Index (A-L): 36.2 ml/m2  LAESV MOD A2C: 50.3 ml  LAESV MOD A4C: 92 ml  LAESV(A-L): 71.8 ml  LAESV(MOD BP): 70.1 ml  LAESVInd MOD BP: 35.4 ml/m2  LALs A2C: 6.6 cm  LALs A4C: 6.2 cm  LVEDV MOD A4C: 73.7 ml  LVEF MOD A4C: 73 %  LVESV MOD A4C: 19.9 ml  LVIDd: 5.2 cm  LVIDs: 3.4 cm  LVLd A4C: 6.8 cm  LVLs A4C: 5.2 cm  LVPWd: 1 cm  RAAs A4C: 14.8 cm2  RAESV A-L: 34.4 ml  RAESV MOD: 33.1 ml  RALs: 5.4 cm  RVIDd: 2.3 cm  RWT: 0.4  SV MOD A4C: 53.8 ml  SV(Teich): 81.7 ml    MM  TAPSE: 3.8 cm    PW  E' Av.1 m/s  E' Lat: 0.1 m/s  E' Sept: 0.1 m/s  E/E' Av.8  E/E' Lat: 6.3  E/E' Sept: 7.4  MV A David: 0.9 m/s  MV Dec [facility]: 3.9 m/s2  MV DecT: 184.2 ms  MV E David: 0.7 m/s  MV E/A Ratio: 0.8  TR Vmax: 0.2 m/s  TR maxP.1 mmHg    [employer] Accredited Echocardiography Laboratory    Prepared and electronically signed by    Paul Doyle D.O.  Signed 31-Mar-2021 13:50:22    No results found for this or any previous visit.    No results found for this or any previous visit.    No results found for this or any previous visit.    Mammo diagnostic right w 3d & cad, US breast right limited (diagnostic)  Narrative: DIAGNOSIS: Abnormal mammogram     TECHNIQUE:   Digital diagnostic mammography was performed. Computer Aided Detection   (CAD)  analyzed all applicable images.  Right breast ultrasound was performed.     COMPARISONS: Prior breast imaging dated: 08/02/2023, 08/02/2023,   07/12/2023, 04/08/2022, 04/05/2021, 03/13/2020, 01/28/2019, 01/26/2018,   01/26/2018, 01/25/2017, 01/25/2017, 01/20/2016, 01/20/2016, and 01/07/2015    RELEVANT HISTORY:   Family Breast Cancer History: History of breast cancer in Maternal Aunt.  Family Medical History: Family medical history includes breast cancer in   maternal aunt.   Personal History: Hormone history includes birth control. Surgical history   includes breast reduction and hysterectomy. No known relevant medical   history.    RISK ASSESSMENT:   5 Year Tyrer-Cuzick: 1.37%  10 Year Tyrer-Cuzick: 2.79%  Lifetime Tyrer-Cuzick: 5.1%    TISSUE DENSITY:   There are scattered areas of fibroglandular density.    INDICATION: Wanda Schaffer is a 69 y.o. female presenting for 6 mo f/u.    FINDINGS:   RIGHT  1) MASS [A]  Mammo diagnostic right w 3d & cad: There is a 5 mm x 2 mm x 4 mm oval mass   with circumscribed margins seen in the right breast at 7 o'clock in the   posterior depth.   US breast right limited (diagnostic): There is a 5 mm x 2 mm x 4 mm oval   mass with circumscribed margins seen in the right breast at 7 o'clock in   the posterior depth.   Impression: Stable probably benign findings right breast for which continued 6-month   follow-up is recommended.    ASSESSMENT/BI-RADS CATEGORY:  Right: 3 - Probably Benign  Overall: 3 - Probably Benign    RECOMMENDATION:       - Diagnostic mammogram in 6 months for the right breast.       - Ultrasound in 6 months for the right breast.    Workstation ID: [id number]         *-*-*-*-*-*-*-*-*-*-*-*-*-*-*-*-*-*-*-*-*-*-*-*-*-*-*-*-*-*-*-*-*-*-*-*-*-*-*-*-*-*-*-*-*-*-*-*-*-*-*-*-*-*-  SIGNATURES:   @SIG@   Ty Anderson MD; [facility]    *-*-*-*-*-*-*-*-*-*-*-*-*-*-*-*-*-*-*-*-*-*-*-*-*-*-*-*-*-*-*-*-*-*-*-*-*-*-*-*-*-*-*-*-*-*-*-*-*-*-*-*-*-*-  PAST MEDICAL HISTORY:  Past  Medical History:   Diagnosis Date    Asthma     Colon polyp     Diabetes mellitus (HCC)     Disease of thyroid gland     GERD (gastroesophageal reflux disease)     Hyperlipidemia     Hypertension     Obesity     Pre-diabetes     Seasonal allergies     Thyroid disease     Tinnitus     PAST SURGICAL HISTORY:  Past Surgical History:   Procedure Laterality Date     SECTION      Triplets    COLONOSCOPY      COLONOSCOPY W/ POLYPECTOMY  2020    repeat     HYSTERECTOMY      age 49    REDUCTION MAMMAPLASTY  2009         FAMILY HISTORY:  Family History   Problem Relation Age of Onset    Coronary artery disease Maternal Grandfather     Breast cancer Maternal Aunt 58    Stroke Mother     No Known Problems Father     No Known Problems Sister     No Known Problems Daughter     No Known Problems Maternal Grandmother     No Known Problems Paternal Grandmother     No Known Problems Paternal Grandfather     No Known Problems Daughter     No Known Problems Daughter     No Known Problems Sister     No Known Problems Sister     No Known Problems Sister     No Known Problems Sister     No Known Problems Sister     No Known Problems Sister     No Known Problems Maternal Aunt     No Known Problems Maternal Aunt     No Known Problems Maternal Aunt     No Known Problems Maternal Aunt     No Known Problems Maternal Aunt     No Known Problems Paternal Aunt     No Known Problems Paternal Aunt     SOCIAL HISTORY:  Social History     Tobacco Use   Smoking Status Former    Current packs/day: 0.00    Average packs/day: 0.5 packs/day for 38.2 years (19.1 ttl pk-yrs)    Types: Cigarettes    Start date:     Quit date: 3/18/2010    Years since quittin.0   Smokeless Tobacco Never      Social History     Substance and Sexual Activity   Alcohol Use Yes    Alcohol/week: 4.0 standard drinks of alcohol    Types: 4 Glasses of wine per week    Comment: social     Social History     Substance and Sexual Activity   Drug Use No    [unfilled]      *-*-*-*-*-*-*-*-*-*-*-*-*-*-*-*-*-*-*-*-*-*-*-*-*-*-*-*-*-*-*-*-*-*-*-*-*-*-*-*-*-*-*-*-*-*-*-*-*-*-*-*-*-*  ALLERGIES:  Allergies   Allergen Reactions    Seasonal Ic [Octacosanol] Allergic Rhinitis    CURRENT SCHEDULED MEDICATIONS:    Current Outpatient Medications:     albuterol (2.5 mg/3 mL) 0.083 % nebulizer solution, USE 1 AMPULE VIA NEBULIZER EVERY 6 HOURS AS NEEDED FOR WHEEZING OR SHORTNESS OF BREATH, Disp: 300 mL, Rfl: 0    calcium carbonate-vitamin D (OSCAL-D) 500 mg-200 units per tablet, Take 1 tablet by mouth daily with breakfast, Disp: 60 tablet, Rfl: 5    cetirizine (ZyrTEC) 10 mg tablet, Take 1 tablet (10 mg total) by mouth daily, Disp: 30 tablet, Rfl: 0    cholecalciferol (VITAMIN D3) 1,000 units tablet, Take 1 tablet (1,000 Units total) by mouth daily, Disp: 30 tablet, Rfl: 5    fluticasone (FLONASE) 50 mcg/act nasal spray, 2 sprays into each nostril daily as needed for rhinitis, Disp: 16 g, Rfl: 1    Fluticasone-Salmeterol (Advair Diskus) 100-50 mcg/dose inhaler, Inhale 1 puff 2 (two) times a day Rinse mouth after use., Disp: 60 blister, Rfl: 1    glucose blood test strip, Checks blood glucose 3 times a day., Disp: 100 each, Rfl: 3    hydrochlorothiazide (HYDRODIURIL) 25 mg tablet, Take 1 tablet (25 mg total) by mouth daily, Disp: 90 tablet, Rfl: 0    Lancets (OneTouch Delica Plus [id number]) MISC, Test 1 time a day, Disp: 100 each, Rfl: 0    levothyroxine 50 mcg tablet, Take 1 tablet (50 mcg total) by mouth daily, Disp: 90 tablet, Rfl: 3    montelukast (SINGULAIR) 10 mg tablet, Take 1 tablet (10 mg total) by mouth daily at bedtime, Disp: 90 tablet, Rfl: 1    Multiple Vitamins-Minerals (MULTIVITAMIN ADULT PO), 1 tablet every 24 hours, Disp: , Rfl:     olmesartan (BENICAR) 40 mg tablet, Take 1 tablet (40 mg total) by mouth daily, Disp: 90 tablet, Rfl: 3    pantoprazole (PROTONIX) 40 mg tablet, Take 1 tablet (40 mg total) by mouth daily, Disp: 30 tablet, Rfl: 2    rosuvastatin (CRESTOR) 5 mg  tablet, Take 1 tablet (5 mg total) by mouth daily, Disp: 90 tablet, Rfl: 3    tirzepatide (Mounjaro) 7.5 MG/0.5ML, Inject 0.5 mL (7.5 mg total) under the skin every 7 days, Disp: 2 mL, Rfl: 0    Ventolin  (90 Base) MCG/ACT inhaler, Inhale 2 puffs 4 (four) times a day, Disp: 18 g, Rfl: 5    lidocaine (XYLOCAINE) 5 % ointment, Apply topically as needed for mild pain (Patient not taking: Reported on 4/2/2024), Disp: 240 g, Rfl: 2  No current facility-administered medications for this visit.     *-*-*-*-*-*-*-*-*-*-*-*-*-*-*-*-*-*-*-*-*-*-*-*-*-*-*-*-*-*-*-*-*-*-*-*-*-*-*-*-*-*-*-*-*-*-*-*-*-*-*-*-*-*

## 2024-04-02 NOTE — ASSESSMENT & PLAN NOTE
Pressure is well-controlled on current regimen at olmesartan and HCTZ.  Has had no symptoms of heart failure.  Examination is benign.    -- She is advised to continue current medications.

## 2024-04-02 NOTE — PATIENT INSTRUCTIONS
CARDIOLOGY ASSESSMENT & PLAN      Diagnosis ICD-10-CM Associated Orders   1. Primary hypertension  I10 POCT ECG      2. 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 Type 2 diabetes mellitus with hyperglycemia, without long-term current use of insulin ([facility])  E11.65       4. Mixed hyperlipidemia  E78.2       5. Morbid obesity with BMI of 40.0-44.9, adult ([facility])  E66.01     Z68.41          Hypertension  Pressure is well-controlled on current regimen at olmesartan and HCTZ.  Has had no symptoms of heart failure.  Examination is benign.    -- She is advised to continue current medications.    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  No evidence of pulmonary hypertension on echocardiogram in 2022.  No symptoms that suggest decompensated heart failure or untreated obstructive sleep apnea.  -- Patient is encouraged to try to lose weight through dietary changes and physical activity.    Type 2 diabetes mellitus with hyperglycemia, without long-term current use of insulin ([facility])    Lab Results   Component Value Date    HGBA1C 6.4 12/19/2023   Has more or less prediabetes rather than diabetes.  Does follow closely with primary physician.    Mixed hyperlipidemia  Goal LDL level is less than 100.  LDL is good on current regimen with rosuvastatin 5 mg daily.  -- Will continue current medical regimen.    Morbid obesity with BMI of 40.0-44.9, adult ([facility])  -- Patient is encouraged to try to lose weight through dietary changes and physical activity.

## 2024-04-02 NOTE — ASSESSMENT & PLAN NOTE
Goal LDL level is less than 100.  LDL is good on current regimen with rosuvastatin 5 mg daily.  -- Will continue current medical regimen.

## 2024-04-02 NOTE — ASSESSMENT & PLAN NOTE
Lab Results   Component Value Date    HGBA1C 6.4 12/19/2023   Has more or less prediabetes rather than diabetes.  Does follow closely with primary physician.

## 2024-04-02 NOTE — ASSESSMENT & PLAN NOTE
-- Patient is encouraged to try to lose weight through dietary changes and physical activity.

## 2024-04-02 NOTE — ASSESSMENT & PLAN NOTE
Incidental finding of enlarged pulmonary artery.  No evidence of pulmonary hypertension on echocardiogram in 2022.  No symptoms that suggest decompensated heart failure or untreated obstructive sleep apnea.  -- Patient is encouraged to try to lose weight through dietary changes and physical activity.

## 2024-04-08 DIAGNOSIS — E11.65 TYPE 2 DIABETES MELLITUS WITH HYPERGLYCEMIA, WITHOUT LONG-TERM CURRENT USE OF INSULIN (HCC): ICD-10-CM

## 2024-04-08 NOTE — TELEPHONE ENCOUNTER
Reason for call:   [x] Refill   [] Prior Auth  [] Other:     Office:   [x] PCP/Provider - [facility]  [] Specialty/Provider -     Medication:      Does the patient have enough for 3 days?   [] Yes   [x] No - Send as HP to POD

## 2024-04-15 ENCOUNTER — APPOINTMENT (OUTPATIENT)
Dept: RADIOLOGY | Facility: MEDICAL CENTER | Age: 69
End: 2024-04-15
Payer: COMMERCIAL

## 2024-04-15 ENCOUNTER — OFFICE VISIT (OUTPATIENT)
Dept: OBGYN CLINIC | Facility: MEDICAL CENTER | Age: 69
End: 2024-04-15
Payer: COMMERCIAL

## 2024-04-15 VITALS
DIASTOLIC BLOOD PRESSURE: 60 MMHG | BODY MASS INDEX: 38.64 KG/M2 | HEART RATE: 73 BPM | SYSTOLIC BLOOD PRESSURE: 91 MMHG | WEIGHT: 210 LBS | HEIGHT: 62 IN

## 2024-04-15 DIAGNOSIS — M25.561 RIGHT KNEE PAIN, UNSPECIFIED CHRONICITY: ICD-10-CM

## 2024-04-15 DIAGNOSIS — G89.29 CHRONIC PAIN OF RIGHT KNEE: Primary | ICD-10-CM

## 2024-04-15 DIAGNOSIS — M25.561 CHRONIC PAIN OF RIGHT KNEE: Primary | ICD-10-CM

## 2024-04-15 DIAGNOSIS — M17.11 PRIMARY OSTEOARTHRITIS OF RIGHT KNEE: ICD-10-CM

## 2024-04-15 PROCEDURE — 99203 OFFICE O/P NEW LOW 30 MIN: CPT | Performed by: EMERGENCY MEDICINE

## 2024-04-15 PROCEDURE — 73564 X-RAY EXAM KNEE 4 OR MORE: CPT

## 2024-04-15 NOTE — PATIENT INSTRUCTIONS
You may use Advil (ibuprofen) 400-600mg every 6 hours or at least twice per day (OR Aleve (naproxen) 250-500mg every 12 hours as needed for pain and inflammation).    You may also take Tylenol (acetaminophen) together with Advil (ibuprofen) or Aleve (naproxen) as this is safe and can help decrease your pain levels.  The dosing for Tylenol is 500mg every 4-6 hours as needed OR max 1,000mg per dose up to 3 times per day for a total of 3,000mg per day  *Check with your primary care physician to see if these medications are safe to take and to make sure they do not interfere with your other medications and medical issues.

## 2024-04-15 NOTE — PROGRESS NOTES
Assessment/Plan:    Diagnoses and all orders for this visit:    Chronic pain of right knee  -     XR knee 4+ vw right injury; Future  -     Injection Procedure Prior Authorization; Future  -     Ambulatory Referral to Physical Therapy; Future  -     Durable Medical Equipment    Primary osteoarthritis of right knee  -     Injection Procedure Prior Authorization; Future  -     Ambulatory Referral to Physical Therapy; Future  -     Durable Medical Equipment    No significant benefit from corticosteroid injection performed in March.    VISCOSUPPLEMENTATION:  Patient has failed conservative treatment including:  NSAIDs   Tylenol   Home exercises   Physical therapy   Weight loss  Assistive devices.    Patient has failed corticosteroid injection  Patient is symptomatic and pain interferes with ADLs/sleep  Pain score 7/10      Return for Visco Right Knee.      Subjective:   Patient ID: Wanda Schaffer is a 69 y.o. female.    Patient presents for chronic right knee pain   PCP evaluation in March was provided a corticosteroid injection with no significant benefit      Review of Systems    The following portions of the patient's chart were reviewed and updated as appropriate:   Allergy:    Allergies   Allergen Reactions    Seasonal Ic [Octacosanol] Allergic Rhinitis       Medications:    Current Outpatient Medications:     albuterol (2.5 mg/3 mL) 0.083 % nebulizer solution, USE 1 AMPULE VIA NEBULIZER EVERY 6 HOURS AS NEEDED FOR WHEEZING OR SHORTNESS OF BREATH, Disp: 300 mL, Rfl: 0    calcium carbonate-vitamin D (OSCAL-D) 500 mg-200 units per tablet, Take 1 tablet by mouth daily with breakfast, Disp: 60 tablet, Rfl: 5    cetirizine (ZyrTEC) 10 mg tablet, Take 1 tablet (10 mg total) by mouth daily, Disp: 30 tablet, Rfl: 0    cholecalciferol (VITAMIN D3) 1,000 units tablet, Take 1 tablet (1,000 Units total) by mouth daily, Disp: 30 tablet, Rfl: 5    fluticasone (FLONASE) 50 mcg/act nasal spray, 2 sprays into each nostril daily  as needed for rhinitis, Disp: 16 g, Rfl: 1    Fluticasone-Salmeterol (Advair Diskus) 100-50 mcg/dose inhaler, Inhale 1 puff 2 (two) times a day Rinse mouth after use., Disp: 60 blister, Rfl: 1    glucose blood test strip, Checks blood glucose 3 times a day., Disp: 100 each, Rfl: 3    hydrochlorothiazide (HYDRODIURIL) 25 mg tablet, Take 1 tablet (25 mg total) by mouth daily, Disp: 90 tablet, Rfl: 0    Lancets (OneTouch Delica Plus [id number]) MISC, Test 1 time a day, Disp: 100 each, Rfl: 0    levothyroxine 50 mcg tablet, Take 1 tablet (50 mcg total) by mouth daily, Disp: 90 tablet, Rfl: 3    montelukast (SINGULAIR) 10 mg tablet, Take 1 tablet (10 mg total) by mouth daily at bedtime, Disp: 90 tablet, Rfl: 1    Multiple Vitamins-Minerals (MULTIVITAMIN ADULT PO), 1 tablet every 24 hours, Disp: , Rfl:     olmesartan (BENICAR) 40 mg tablet, Take 1 tablet (40 mg total) by mouth daily, Disp: 90 tablet, Rfl: 3    pantoprazole (PROTONIX) 40 mg tablet, Take 1 tablet (40 mg total) by mouth daily, Disp: 30 tablet, Rfl: 2    rosuvastatin (CRESTOR) 5 mg tablet, Take 1 tablet (5 mg total) by mouth daily, Disp: 90 tablet, Rfl: 3    tirzepatide (Mounjaro) 7.5 MG/0.5ML, Inject 0.5 mL (7.5 mg total) under the skin every 7 days, Disp: 2 mL, Rfl: 5    Ventolin  (90 Base) MCG/ACT inhaler, Inhale 2 puffs 4 (four) times a day, Disp: 18 g, Rfl: 5    lidocaine (XYLOCAINE) 5 % ointment, Apply topically as needed for mild pain (Patient not taking: Reported on 4/2/2024), Disp: 240 g, Rfl: 2    Patient Active Problem List   Diagnosis    Hypertension    Prediabetes    Morbid obesity with BMI of 40.0-44.9, adult (HCC)    Mixed hyperlipidemia    Seasonal allergic rhinitis    Hypothyroidism (acquired)    Asthma    Gastritis without bleeding    Encounter for general adult medical examination with abnormal findings    Atopic dermatitis    Interstitial lung disease ([facility])    Abnormal CT scan of lung    Restrictive lung disease    Enlarged pulmonary  "artery (HCC)    Kidney congenitally absent, left    Heel pain    Varicose veins of lower extremity    Ingrown toenail    Osteopenia of lumbar spine    Chronic bilateral low back pain without sciatica    Snoring    Type 2 diabetes mellitus with hyperglycemia, without long-term current use of insulin (HCC)    Latent tuberculosis treated     Right knee pain, unspecified chronicity    Primary osteoarthritis of right knee       Objective:  BP 91/60   Pulse 73   Ht 5' 2\" (1.575 m)   Wt 95.3 kg (210 lb)   BMI 38.41 kg/m²     Ortho Exam    Physical Exam      Neurologic Exam    Procedures    I have personally reviewed pertinent films in PACS and my interpretation is x-rays right knee show mild medial joint space narrowing no acute fracture or dislocation no osseous lesions.            Past Medical History:   Diagnosis Date    Asthma     Colon polyp     Diabetes mellitus (HCC)     Disease of thyroid gland     GERD (gastroesophageal reflux disease)     Hyperlipidemia     Hypertension     Obesity     Pre-diabetes     Seasonal allergies     Thyroid disease     Tinnitus        Past Surgical History:   Procedure Laterality Date     SECTION      Triplets    COLONOSCOPY      COLONOSCOPY W/ POLYPECTOMY  2020    repeat     HYSTERECTOMY      age 49    REDUCTION MAMMAPLASTY         Social History     Socioeconomic History    Marital status: /Civil Union     Spouse name: Not on file    Number of children: Not on file    Years of education: Not on file    Highest education level: Not on file   Occupational History    Not on file   Tobacco Use    Smoking status: Former     Current packs/day: 0.00     Average packs/day: 0.5 packs/day for 38.2 years (19.1 ttl pk-yrs)     Types: Cigarettes     Start date:      Quit date: 3/18/2010     Years since quittin.0     Passive exposure: Past    Smokeless tobacco: Never   Vaping Use    Vaping status: Never Used   Substance and Sexual Activity    Alcohol use: " Yes     Alcohol/week: 4.0 standard drinks of alcohol     Types: 4 Glasses of wine per week     Comment: social    Drug use: No    Sexual activity: Not Currently     Birth control/protection: None   Other Topics Concern    Not on file   Social History Narrative    Not on file     Social Determinants of Health     Financial Resource Strain: Low Risk  (1/3/2024)    Overall Financial Resource Strain (CARDIA)     Difficulty of Paying Living Expenses: Not hard at all   Food Insecurity: No Food Insecurity (1/3/2024)    Hunger Vital Sign     Worried About Running Out of Food in the Last Year: Never true     Ran Out of Food in the Last Year: Never true   Transportation Needs: No Transportation Needs (1/3/2024)    PRAPARE - Transportation     Lack of Transportation (Medical): No     Lack of Transportation (Non-Medical): No   Physical Activity: Not on file   Stress: Not on file   Social Connections: Not on file   Intimate Partner Violence: Not on file   Housing Stability: Not on file       Family History   Problem Relation Age of Onset    Coronary artery disease Maternal Grandfather     Breast cancer Maternal Aunt 58    Stroke Mother     No Known Problems Father     No Known Problems Sister     No Known Problems Daughter     No Known Problems Maternal Grandmother     No Known Problems Paternal Grandmother     No Known Problems Paternal Grandfather     No Known Problems Daughter     No Known Problems Daughter     No Known Problems Sister     No Known Problems Sister     No Known Problems Sister     No Known Problems Sister     No Known Problems Sister     No Known Problems Sister     No Known Problems Maternal Aunt     No Known Problems Maternal Aunt     No Known Problems Maternal Aunt     No Known Problems Maternal Aunt     No Known Problems Maternal Aunt     No Known Problems Paternal Aunt     No Known Problems Paternal Aunt

## 2024-04-29 ENCOUNTER — PROCEDURE VISIT (OUTPATIENT)
Dept: OBGYN CLINIC | Facility: MEDICAL CENTER | Age: 69
End: 2024-04-29
Payer: COMMERCIAL

## 2024-04-29 VITALS
HEART RATE: 65 BPM | HEIGHT: 62 IN | DIASTOLIC BLOOD PRESSURE: 69 MMHG | BODY MASS INDEX: 38.41 KG/M2 | SYSTOLIC BLOOD PRESSURE: 109 MMHG

## 2024-04-29 DIAGNOSIS — G89.29 CHRONIC PAIN OF RIGHT KNEE: Primary | ICD-10-CM

## 2024-04-29 DIAGNOSIS — M25.561 CHRONIC PAIN OF RIGHT KNEE: Primary | ICD-10-CM

## 2024-04-29 DIAGNOSIS — M17.11 PRIMARY OSTEOARTHRITIS OF RIGHT KNEE: ICD-10-CM

## 2024-04-29 PROCEDURE — 20610 DRAIN/INJ JOINT/BURSA W/O US: CPT | Performed by: EMERGENCY MEDICINE

## 2024-04-29 NOTE — PROGRESS NOTES
Assessment/Plan:    Diagnoses and all orders for this visit:    Chronic pain of right knee  -     Large joint arthrocentesis: R knee    Primary osteoarthritis of right knee  -     Large joint arthrocentesis: R knee    Patient presents for Visco injection.  No previous benefit from CSI.    Return if symptoms worsen or fail to improve.      Subjective:   Patient ID: Wanda Schaffer is a 69 y.o. female.    HPI    Review of Systems    The following portions of the patient's chart were reviewed and updated as appropriate:   Allergy:    Allergies   Allergen Reactions    Seasonal Ic [Octacosanol] Allergic Rhinitis       Medications:    Current Outpatient Medications:     albuterol (2.5 mg/3 mL) 0.083 % nebulizer solution, USE 1 AMPULE VIA NEBULIZER EVERY 6 HOURS AS NEEDED FOR WHEEZING OR SHORTNESS OF BREATH, Disp: 300 mL, Rfl: 0    calcium carbonate-vitamin D (OSCAL-D) 500 mg-200 units per tablet, Take 1 tablet by mouth daily with breakfast, Disp: 60 tablet, Rfl: 5    cetirizine (ZyrTEC) 10 mg tablet, Take 1 tablet (10 mg total) by mouth daily, Disp: 30 tablet, Rfl: 0    cholecalciferol (VITAMIN D3) 1,000 units tablet, Take 1 tablet (1,000 Units total) by mouth daily, Disp: 30 tablet, Rfl: 5    fluticasone (FLONASE) 50 mcg/act nasal spray, 2 sprays into each nostril daily as needed for rhinitis, Disp: 16 g, Rfl: 1    Fluticasone-Salmeterol (Advair Diskus) 100-50 mcg/dose inhaler, Inhale 1 puff 2 (two) times a day Rinse mouth after use., Disp: 60 blister, Rfl: 1    glucose blood test strip, Checks blood glucose 3 times a day., Disp: 100 each, Rfl: 3    hydrochlorothiazide (HYDRODIURIL) 25 mg tablet, Take 1 tablet (25 mg total) by mouth daily, Disp: 90 tablet, Rfl: 0    Lancets (OneTouch Delica Plus [id number]) MISC, Test 1 time a day, Disp: 100 each, Rfl: 0    levothyroxine 50 mcg tablet, Take 1 tablet (50 mcg total) by mouth daily, Disp: 90 tablet, Rfl: 3    montelukast (SINGULAIR) 10 mg tablet, Take 1 tablet (10 mg  "total) by mouth daily at bedtime, Disp: 90 tablet, Rfl: 1    Multiple Vitamins-Minerals (MULTIVITAMIN ADULT PO), 1 tablet every 24 hours, Disp: , Rfl:     olmesartan (BENICAR) 40 mg tablet, Take 1 tablet (40 mg total) by mouth daily, Disp: 90 tablet, Rfl: 3    pantoprazole (PROTONIX) 40 mg tablet, Take 1 tablet (40 mg total) by mouth daily, Disp: 30 tablet, Rfl: 2    rosuvastatin (CRESTOR) 5 mg tablet, Take 1 tablet (5 mg total) by mouth daily, Disp: 90 tablet, Rfl: 3    tirzepatide (Mounjaro) 7.5 MG/0.5ML, Inject 0.5 mL (7.5 mg total) under the skin every 7 days, Disp: 2 mL, Rfl: 5    Ventolin  (90 Base) MCG/ACT inhaler, Inhale 2 puffs 4 (four) times a day, Disp: 18 g, Rfl: 5    lidocaine (XYLOCAINE) 5 % ointment, Apply topically as needed for mild pain (Patient not taking: Reported on 4/2/2024), Disp: 240 g, Rfl: 2    Patient Active Problem List   Diagnosis    Hypertension    Prediabetes    Morbid obesity with BMI of 40.0-44.9, adult (HCC)    Mixed hyperlipidemia    Seasonal allergic rhinitis    Hypothyroidism (acquired)    Asthma    Gastritis without bleeding    Encounter for general adult medical examination with abnormal findings    Atopic dermatitis    Interstitial lung disease (HCC)    Abnormal CT scan of lung    Restrictive lung disease    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HCC)    Kidney congenitally absent, left    Heel pain    Varicose veins of lower extremity    Ingrown toenail    Osteopenia of lumbar spine    Chronic bilateral low back pain without sciatica    Snoring    Type 2 diabetes mellitus with hyperglycemia, without long-term current use of insulin (HCC)    Latent tuberculosis treated 2005    Right knee pain, unspecified chronicity    Primary osteoarthritis of right knee       Objective:  /69   Pulse 65   Ht 5' 2\" (1.575 m)   BMI 38.41 kg/m²     Ortho Exam    Physical Exam      Neurologic Exam    Large joint arthrocentesis: R knee  Universal Protocol:  Consent: Verbal consent " "obtained.  Risks and benefits: risks, benefits and alternatives were discussed  Consent given by: patient  Time out: Immediately prior to procedure a \"time out\" was called to verify the correct patient, procedure, equipment, support staff and site/side marked as required.  Timeout called at: 2024 11:53 AM.  Patient understanding: patient states understanding of the procedure being performed  Test results: test results available and properly labeled  Site marked: the operative site was marked  Patient identity confirmed: verbally with patient  Supporting Documentation  Indications: pain   Procedure Details  Location: knee - R knee  Preparation: Patient was prepped and draped in the usual sterile fashion  Needle size: 20 G  Ultrasound guidance: no  Approach: anterolateral  Medications administered: 3 mL sodium hyaluronate 60 MG/3ML    Patient tolerance: patient tolerated the procedure well with no immediate complications  Dressing:  Sterile dressing applied    No erythema of knees          I have personally reviewed the written report of the pertinent studies.             Past Medical History:   Diagnosis Date    Asthma     Colon polyp     Diabetes mellitus (HCC)     Disease of thyroid gland     GERD (gastroesophageal reflux disease)     Hyperlipidemia     Hypertension     Obesity     Pre-diabetes     Seasonal allergies     Thyroid disease     Tinnitus        Past Surgical History:   Procedure Laterality Date     SECTION      Triplets    COLONOSCOPY      COLONOSCOPY W/ POLYPECTOMY  2020    repeat     HYSTERECTOMY      age 49    REDUCTION MAMMAPLASTY         Social History     Socioeconomic History    Marital status: /Civil Union     Spouse name: Not on file    Number of children: Not on file    Years of education: Not on file    Highest education level: Not on file   Occupational History    Not on file   Tobacco Use    Smoking status: Former     Current packs/day: 0.00     Average " packs/day: 0.5 packs/day for 38.2 years (19.1 ttl pk-yrs)     Types: Cigarettes     Start date:      Quit date: 3/18/2010     Years since quittin.1     Passive exposure: Past    Smokeless tobacco: Never   Vaping Use    Vaping status: Never Used   Substance and Sexual Activity    Alcohol use: Yes     Alcohol/week: 4.0 standard drinks of alcohol     Types: 4 Glasses of wine per week     Comment: social    Drug use: No    Sexual activity: Not Currently     Birth control/protection: None   Other Topics Concern    Not on file   Social History Narrative    Not on file     Social Determinants of Health     Financial Resource Strain: Low Risk  (1/3/2024)    Overall Financial Resource Strain (CARDIA)     Difficulty of Paying Living Expenses: Not hard at all   Food Insecurity: No Food Insecurity (1/3/2024)    Hunger Vital Sign     Worried About Running Out of Food in the Last Year: Never true     Ran Out of Food in the Last Year: Never true   Transportation Needs: No Transportation Needs (1/3/2024)    PRAPARE - Transportation     Lack of Transportation (Medical): No     Lack of Transportation (Non-Medical): No   Physical Activity: Not on file   Stress: Not on file   Social Connections: Not on file   Intimate Partner Violence: Not on file   Housing Stability: Not on file       Family History   Problem Relation Age of Onset    Coronary artery disease Maternal Grandfather     Breast cancer Maternal Aunt 58    Stroke Mother     No Known Problems Father     No Known Problems Sister     No Known Problems Daughter     No Known Problems Maternal Grandmother     No Known Problems Paternal Grandmother     No Known Problems Paternal Grandfather     No Known Problems Daughter     No Known Problems Daughter     No Known Problems Sister     No Known Problems Sister     No Known Problems Sister     No Known Problems Sister     No Known Problems Sister     No Known Problems Sister     No Known Problems Maternal Aunt     No Known  Problems Maternal Aunt     No Known Problems Maternal Aunt     No Known Problems Maternal Aunt     No Known Problems Maternal Aunt     No Known Problems Paternal Aunt     No Known Problems Paternal Aunt

## 2024-04-29 NOTE — LETTER
April 29, 2024     Patient: Wanda Schaffer  YOB: 1955  Date of Visit: 4/29/2024      To Whom it May Concern:    Wanda Schaffer is under my professional care. Wanda was seen in my office on 4/29/2024.  Work excuse for today.    If you have any questions or concerns, please don't hesitate to call.         Sincerely,          Claude Gilbert MD        CC: No Recipients

## 2024-05-01 ENCOUNTER — OFFICE VISIT (OUTPATIENT)
Dept: PULMONOLOGY | Facility: CLINIC | Age: 69
End: 2024-05-01
Payer: COMMERCIAL

## 2024-05-01 VITALS
OXYGEN SATURATION: 95 % | TEMPERATURE: 97.2 F | WEIGHT: 210 LBS | HEIGHT: 62 IN | HEART RATE: 71 BPM | BODY MASS INDEX: 38.64 KG/M2 | DIASTOLIC BLOOD PRESSURE: 64 MMHG | SYSTOLIC BLOOD PRESSURE: 112 MMHG

## 2024-05-01 DIAGNOSIS — E66.01 MORBID OBESITY WITH BMI OF 40.0-44.9, ADULT (HCC): ICD-10-CM

## 2024-05-01 DIAGNOSIS — J30.1 SEASONAL ALLERGIC RHINITIS DUE TO POLLEN: ICD-10-CM

## 2024-05-01 DIAGNOSIS — J84.9 INTERSTITIAL LUNG DISEASE (HCC): ICD-10-CM

## 2024-05-01 DIAGNOSIS — J30.2 SEASONAL ALLERGIC RHINITIS, UNSPECIFIED TRIGGER: ICD-10-CM

## 2024-05-01 DIAGNOSIS — J45.20 MILD INTERMITTENT ASTHMA WITHOUT COMPLICATION: Primary | ICD-10-CM

## 2024-05-01 PROCEDURE — 99214 OFFICE O/P EST MOD 30 MIN: CPT | Performed by: NURSE PRACTITIONER

## 2024-05-01 RX ORDER — CETIRIZINE HYDROCHLORIDE 10 MG/1
10 TABLET ORAL DAILY
Qty: 30 TABLET | Refills: 0 | Status: SHIPPED | OUTPATIENT
Start: 2024-05-01

## 2024-05-01 RX ORDER — FLUTICASONE PROPIONATE 50 MCG
2 SPRAY, SUSPENSION (ML) NASAL DAILY PRN
Qty: 16 G | Refills: 1 | Status: SHIPPED | OUTPATIENT
Start: 2024-05-01

## 2024-05-01 NOTE — PROGRESS NOTES
Pulmonary Follow-Up Note   Wanda Schaffer 69 y.o. female MRN: [id number]  5/1/2024      Assessment/Plan:    Problem List Items Addressed This Visit       Morbid obesity with BMI of 40.0-44.9, adult (Formerly [facility])     BMI today is 38; she is on Mounjaro and enjoying some success.    Augment this therapy with lifestyle changes like healthy eating and increased physical activity.         Seasonal allergic rhinitis     Active symptoms noted and this is the time of year when she is easily triggered by pollen counts. Just started Flonase this week.    She is taking Flonase, can add OTC Cetirizine             Relevant Medications    fluticasone (FLONASE) 50 mcg/act nasal spray    cetirizine (ZyrTEC) 10 mg tablet    Asthma - Primary     She is doing well and asthma is well controlled  Continue Advair 100/50 - taking as needed  Albuterol HFA or nebulizer up to every 4 hours if needed  No current need for prednisone or antibiotic         Interstitial lung disease (Formerly [facility])     Reviewed PFT - no clear change as compared with prior PFT's dating back to 2021  Reviewed chest CT - mild progression of fibrosis at lateral lung bases    She has no active symptoms and feels she is doing well overall  Will repeat imaging and PFT in 12 months, and follow up to review those results afterwards.           Relevant Medications    fluticasone (FLONASE) 50 mcg/act nasal spray    cetirizine (ZyrTEC) 10 mg tablet    Other Relevant Orders    CT chest high resolution    Complete PFT with post bronchodilator     Vaccines: up to date    Return in about 1 year (around 5/1/2025) for after PFT and HRCT.    All of Wanda's questions were answered prior to leaving the office today.  She is aware to call our office with any further questions or concerns.    History of Present Illness   Reason for Visit: follow up  Chief Complaint: none  HPI: Wanda Schaffer is a 69 y.o. female who presents to the office today feeling well.    She has no tight chest, cough, wheezing or  shortness of breath. Some runny nose and sneezing associated with seasonal allergies and she did start Flonase yesterday but is running low on her supply; has not started Cetirizine.    She has lost some weight with Mounjaro and notes that her shortness of breath is improved. She is starting to pursue exercise, walking, etc.    Review of Systems   Constitutional:  Negative for chills, fatigue and fever.   HENT:  Negative for congestion and postnasal drip.    Respiratory:  Negative for cough, chest tightness, shortness of breath and wheezing.    Cardiovascular:  Negative for chest pain, palpitations and leg swelling.   Gastrointestinal:  Negative for abdominal pain.   Allergic/Immunologic: Negative for environmental allergies.   All other systems reviewed and are negative.      Historical Information   Past Medical History:   Diagnosis Date    Asthma     Colon polyp     Diabetes mellitus (HCC)     Disease of thyroid gland     GERD (gastroesophageal reflux disease)     Hyperlipidemia     Hypertension     Obesity     Pre-diabetes     Seasonal allergies     Thyroid disease     Tinnitus      Past Surgical History:   Procedure Laterality Date     SECTION      Triplets    COLONOSCOPY      COLONOSCOPY W/ POLYPECTOMY  2020    repeat     HYSTERECTOMY      age 49    REDUCTION MAMMAPLASTY       Family History   Problem Relation Age of Onset    Coronary artery disease Maternal Grandfather     Breast cancer Maternal Aunt 58    Stroke Mother     No Known Problems Father     No Known Problems Sister     No Known Problems Daughter     No Known Problems Maternal Grandmother     No Known Problems Paternal Grandmother     No Known Problems Paternal Grandfather     No Known Problems Daughter     No Known Problems Daughter     No Known Problems Sister     No Known Problems Sister     No Known Problems Sister     No Known Problems Sister     No Known Problems Sister     No Known Problems Sister     No Known Problems  Maternal Aunt     No Known Problems Maternal Aunt     No Known Problems Maternal Aunt     No Known Problems Maternal Aunt     No Known Problems Maternal Aunt     No Known Problems Paternal Aunt     No Known Problems Paternal Aunt      Social History   Social History     Substance and Sexual Activity   Alcohol Use Yes    Alcohol/week: 4.0 standard drinks of alcohol    Types: 4 Glasses of wine per week    Comment: social     Social History     Substance and Sexual Activity   Drug Use No     Social History     Tobacco Use   Smoking Status Former    Current packs/day: 0.00    Average packs/day: 0.5 packs/day for 38.2 years (19.1 ttl pk-yrs)    Types: Cigarettes    Start date:     Quit date: 3/18/2010    Years since quittin.1    Passive exposure: Past   Smokeless Tobacco Never     E-Cigarette/Vaping    E-Cigarette Use Never User      E-Cigarette/Vaping Substances    Nicotine No     THC No     CBD No     Flavoring No     Other No     Unknown No        Meds/Allergies     Current Outpatient Medications:     albuterol (2.5 mg/3 mL) 0.083 % nebulizer solution, USE 1 AMPULE VIA NEBULIZER EVERY 6 HOURS AS NEEDED FOR WHEEZING OR SHORTNESS OF BREATH, Disp: 300 mL, Rfl: 0    calcium carbonate-vitamin D (OSCAL-D) 500 mg-200 units per tablet, Take 1 tablet by mouth daily with breakfast, Disp: 60 tablet, Rfl: 5    cetirizine (ZyrTEC) 10 mg tablet, Take 1 tablet (10 mg total) by mouth daily, Disp: 30 tablet, Rfl: 0    cholecalciferol (VITAMIN D3) 1,000 units tablet, Take 1 tablet (1,000 Units total) by mouth daily, Disp: 30 tablet, Rfl: 5    fluticasone (FLONASE) 50 mcg/act nasal spray, 2 sprays into each nostril daily as needed for rhinitis, Disp: 16 g, Rfl: 1    Fluticasone-Salmeterol (Advair Diskus) 100-50 mcg/dose inhaler, Inhale 1 puff 2 (two) times a day Rinse mouth after use., Disp: 60 blister, Rfl: 1    glucose blood test strip, Checks blood glucose 3 times a day., Disp: 100 each, Rfl: 3    hydrochlorothiazide  "(HYDRODIURIL) 25 mg tablet, Take 1 tablet (25 mg total) by mouth daily, Disp: 90 tablet, Rfl: 0    Lancets (OneTouch Delica Plus [id number]) MISC, Test 1 time a day, Disp: 100 each, Rfl: 0    levothyroxine 50 mcg tablet, Take 1 tablet (50 mcg total) by mouth daily, Disp: 90 tablet, Rfl: 3    montelukast (SINGULAIR) 10 mg tablet, Take 1 tablet (10 mg total) by mouth daily at bedtime, Disp: 90 tablet, Rfl: 1    Multiple Vitamins-Minerals (MULTIVITAMIN ADULT PO), 1 tablet every 24 hours, Disp: , Rfl:     olmesartan (BENICAR) 40 mg tablet, Take 1 tablet (40 mg total) by mouth daily, Disp: 90 tablet, Rfl: 3    pantoprazole (PROTONIX) 40 mg tablet, Take 1 tablet (40 mg total) by mouth daily, Disp: 30 tablet, Rfl: 2    rosuvastatin (CRESTOR) 5 mg tablet, Take 1 tablet (5 mg total) by mouth daily, Disp: 90 tablet, Rfl: 3    tirzepatide (Mounjaro) 7.5 MG/0.5ML, Inject 0.5 mL (7.5 mg total) under the skin every 7 days, Disp: 2 mL, Rfl: 5    Ventolin  (90 Base) MCG/ACT inhaler, Inhale 2 puffs 4 (four) times a day, Disp: 18 g, Rfl: 5    lidocaine (XYLOCAINE) 5 % ointment, Apply topically as needed for mild pain (Patient not taking: Reported on 4/2/2024), Disp: 240 g, Rfl: 2  Allergies   Allergen Reactions    Seasonal Ic [Octacosanol] Allergic Rhinitis       Vitals: Blood pressure 112/64, pulse 71, temperature (!) 97.2 °F (36.2 °C), temperature source Tympanic, height 5' 2\" (1.575 m), weight 95.3 kg (210 lb), SpO2 95%, not currently breastfeeding. Body mass index is 38.41 kg/m². Oxygen Therapy  SpO2: 95 %  Oxygen Therapy: None (Room air)      Physical Exam  Vitals reviewed.   Constitutional:       Appearance: Normal appearance.   HENT:      Head: Normocephalic.      Nose: Nose normal.      Mouth/Throat:      Mouth: Mucous membranes are moist.      Pharynx: Oropharynx is clear.   Cardiovascular:      Rate and Rhythm: Normal rate and regular rhythm.      Pulses: Normal pulses.      Heart sounds: Normal heart sounds. "   Pulmonary:      Effort: Pulmonary effort is normal.      Breath sounds: Normal breath sounds.   Musculoskeletal:         General: Normal range of motion.   Skin:     General: Skin is warm and dry.      Capillary Refill: Capillary refill takes less than 2 seconds.   Neurological:      General: No focal deficit present.      Mental Status: She is alert and oriented to person, place, and time. Mental status is at baseline.   Psychiatric:         Mood and Affect: Mood normal.         Behavior: Behavior normal.         Thought Content: Thought content normal.         Judgment: Judgment normal.         Labs: .no recent labs    Imaging and other studies: I have personally reviewed pertinent reports.   and I have personally reviewed pertinent films in PACS  4/1/24 HRCT  Mild progression of juxtapleural fibrosis at the lateral lung bases.     Pulmonary Results (PFTs, PSG): I have personally reviewed pertinent reports.   and I have personally reviewed pertinent films in PACS  PFT 4/4/24  Results:     FEV1/FVC Ratio: 77.77 %  Forced Vital Capacity: 1.69 L           72 % predicted  FEV1: 1.31 L   71 % predicted        After administration of bronchodilator      FVC: 1.77 L, 76 % predicted, +4 % change  FEV1: 1.38 L, 75 % predicted, +5 % change     Lung volumes by body plethysmography:      Total Lung Capacity 74 % predicted   Residual volume 75 % predicted  RV/TLC Ratio: 43.88 %, 102 % predicted     DLCO corrected for patients hemoglobin level: 85 %     Interpretation:     Spirometry is indicative of restriction      No significant improvement in airflow or forced vital capacity in response to the administration to bronchodilator per ATS standards.      Mild restriction as indicated by the lung volumes     Normal diffusion capacity     Flow-volume loop consistent with restriction     Patient ambulated 384.3m in total on room air with lowest O2 sat being 92%, max heart rate was 104bpm. Dyspnea scale was 1/10 upon  "completion.      FAISAL Aquino  [facility]        Portions of the record may have been created with voice recognition software.  Occasional wrong word or \"sound a like\" substitutions may have occurred due to the inherent limitations of voice recognition software.  Read the chart carefully and recognize, using context, where substitutions have occurred or contact the dictating provider.  "

## 2024-05-01 NOTE — ASSESSMENT & PLAN NOTE
She is doing well and asthma is well controlled  Continue Advair 100/50 - taking as needed  Albuterol HFA or nebulizer up to every 4 hours if needed  No current need for prednisone or antibiotic

## 2024-05-01 NOTE — ASSESSMENT & PLAN NOTE
BMI today is 38; she is on Mounjaro and enjoying some success.    Augment this therapy with lifestyle changes like healthy eating and increased physical activity.

## 2024-05-01 NOTE — ASSESSMENT & PLAN NOTE
Active symptoms noted and this is the time of year when she is easily triggered by pollen counts. Just started Flonase this week.    She is taking Flonase, can add OTC Cetirizine

## 2024-05-01 NOTE — ASSESSMENT & PLAN NOTE
Reviewed PFT - no clear change as compared with prior PFT's dating back to 2021  Reviewed chest CT - mild progression of fibrosis at lateral lung bases    She has no active symptoms and feels she is doing well overall  Will repeat imaging and PFT in 12 months, and follow up to review those results afterwards.

## 2024-06-11 ENCOUNTER — OFFICE VISIT (OUTPATIENT)
Dept: FAMILY MEDICINE CLINIC | Facility: CLINIC | Age: 69
End: 2024-06-11
Payer: COMMERCIAL

## 2024-06-11 VITALS
HEART RATE: 74 BPM | TEMPERATURE: 97.8 F | SYSTOLIC BLOOD PRESSURE: 126 MMHG | OXYGEN SATURATION: 98 % | RESPIRATION RATE: 16 BRPM | WEIGHT: 211 LBS | DIASTOLIC BLOOD PRESSURE: 70 MMHG | BODY MASS INDEX: 38.83 KG/M2 | HEIGHT: 62 IN

## 2024-06-11 DIAGNOSIS — I10 HYPERTENSION, UNSPECIFIED TYPE: ICD-10-CM

## 2024-06-11 DIAGNOSIS — E03.9 HYPOTHYROIDISM (ACQUIRED): ICD-10-CM

## 2024-06-11 DIAGNOSIS — E11.65 TYPE 2 DIABETES MELLITUS WITH HYPERGLYCEMIA, WITHOUT LONG-TERM CURRENT USE OF INSULIN (HCC): Primary | ICD-10-CM

## 2024-06-11 DIAGNOSIS — Z29.11 NEED FOR RSV IMMUNIZATION: ICD-10-CM

## 2024-06-11 DIAGNOSIS — Z23 NEED FOR SHINGLES VACCINE: ICD-10-CM

## 2024-06-11 LAB
LEFT EYE DIABETIC RETINOPATHY: NORMAL
LEFT EYE IMAGE QUALITY: NORMAL
LEFT EYE MACULAR EDEMA: NORMAL
LEFT EYE OTHER RETINOPATHY: NORMAL
RIGHT EYE DIABETIC RETINOPATHY: NORMAL
RIGHT EYE IMAGE QUALITY: NORMAL
RIGHT EYE MACULAR EDEMA: NORMAL
RIGHT EYE OTHER RETINOPATHY: NORMAL
SEVERITY (EYE EXAM): NORMAL
SL AMB POCT HEMOGLOBIN AIC: 6 (ref ?–6.5)

## 2024-06-11 PROCEDURE — 99214 OFFICE O/P EST MOD 30 MIN: CPT | Performed by: FAMILY MEDICINE

## 2024-06-11 PROCEDURE — 83036 HEMOGLOBIN GLYCOSYLATED A1C: CPT | Performed by: FAMILY MEDICINE

## 2024-06-11 RX ORDER — ROSUVASTATIN CALCIUM 5 MG/1
5 TABLET, COATED ORAL DAILY
Qty: 90 TABLET | Refills: 3 | Status: SHIPPED | OUTPATIENT
Start: 2024-06-11

## 2024-06-11 RX ORDER — HYDROCHLOROTHIAZIDE 25 MG/1
25 TABLET ORAL DAILY
Qty: 90 TABLET | Refills: 0 | Status: SHIPPED | OUTPATIENT
Start: 2024-06-11

## 2024-06-11 RX ORDER — OLMESARTAN MEDOXOMIL 40 MG/1
40 TABLET ORAL DAILY
Qty: 90 TABLET | Refills: 3 | Status: SHIPPED | OUTPATIENT
Start: 2024-06-11

## 2024-06-11 RX ORDER — LEVOTHYROXINE SODIUM 0.05 MG/1
50 TABLET ORAL DAILY
Qty: 90 TABLET | Refills: 3 | Status: SHIPPED | OUTPATIENT
Start: 2024-06-11

## 2024-06-11 RX ORDER — ZOSTER VACCINE RECOMBINANT, ADJUVANTED 50 MCG/0.5
0.5 KIT INTRAMUSCULAR ONCE
Qty: 1 EACH | Refills: 1 | Status: SHIPPED | OUTPATIENT
Start: 2024-06-11 | End: 2024-06-11

## 2024-06-11 NOTE — ASSESSMENT & PLAN NOTE
Blood pressure is well control, patient will continue same treatment.  Patient understands the risks associated with HTN and the need for adequate control and adherence to therapy. Explained to patient that therapeutic measure is lifelong lifestyle modification including:  Sodium reduction (<2 g/day)  Dietary Approaches to Stop Hypertension (DASH) diet (3 servings of fruit and vegetables daily, whole grains, low sodium, low-fat proteins)   Weight loss to BMI under 30 kg/m^2. Physical activity: 3 to 5 times/week of daily aerobic exercise for 30- to 50-minute sessions as tolerated   Avoid alcohol consumption.

## 2024-06-11 NOTE — ASSESSMENT & PLAN NOTE
Lab Results   Component Value Date    HGBA1C 6.0 06/11/2024   Wanda is doing better with control of hyperglycemia by the percentage HbA1C in general  She complaints with statin therapy and kidney protection therapy.  The goal in treatment is a shared decition that she accepted and it is to decrease risk of vascular disease and it complications.     Retina, Iris test performed today.    Discussed complication from uncontrolled Diabetes and hypoglycemia symptoms.  The importance of following with diabetes educator and life style modification also was stressed.    Compliance with treatment encouraged.  Call if side effect with the treatment.  Bring blood sugars log book at the next appointment.

## 2024-06-11 NOTE — PROGRESS NOTES
Name: Wanda Schaffer      : 1955      MRN: [id number]  Encounter Provider: Ba Jason MD  Encounter Date: 2024   Encounter department: [facility] CARE [facility]     6-Month Follow-Up for Diabetes Mellitus and Hypertension    Chief Complaint:  Diabetes  Hypertension  Hypothyroidism    History of Present Illness:  The patient is a 67-year-old female presenting for a 6-month follow-up for diabetes mellitus (DM), hypertension (HTN), and hypothyroidism. She reports feeling well overall and has been compliant with her medications. Her hemoglobin A1C has improved to 6.0 from 6.4. Her weight has remained stable around 211 lbs. She is currently on tirzepatide (Mounjaro) 7.5 mg weekly and is considering increasing the dose to aid in further weight loss. She denies any chest pain, shortness of breath, or other cardiovascular symptoms. She follows up regularly with her cardiologist and pulmonologist for diastolic dysfunction and asthma, respectively. She also has chronic knee pain managed by an orthopedic specialist.    Medications:  hydroCHLOROthiazide 25 mg tablet, Take 1 tablet (25 mg total) by mouth daily  levothyroxine 50 mcg tablet, Take 1 tablet (50 mcg total) by mouth daily  olmesartan (BENICAR) 40 mg tablet, Take 1 tablet (40 mg total) by mouth daily  rosuvastatin (CRESTOR) 5 mg tablet, Take 1 tablet (5 mg total) by mouth daily  RSVPreF3 Vac Recomb Adjuvanted 120 MCG/0.5ML SUSR, Inject 0.5 mL into a muscle 1 (one) time for 1 dose  tirzepatide (Mounjaro) 7.5 MG/0.5ML, Inject 0.5 mL (7.5 mg total) under the skin every 7 days  Zoster Vac Recomb Adjuvanted (Shingrix) 50 MCG/0.5ML SUSR, Inject 0.5 mL into a muscle once for 1 dose Repeat dose in 2 to 6 months  albuterol (2.5 mg/3 mL) 0.083 % nebulizer solution, USE 1 AMPULE VIA NEBULIZER EVERY 6 HOURS AS NEEDED FOR WHEEZING OR SHORTNESS OF BREATH  calcium carbonate-vitamin D (OSCAL-D) 500 mg-200 units per tablet, Take  1 tablet by mouth daily with breakfast  cetirizine (ZyrTEC) 10 mg tablet, Take 1 tablet (10 mg total) by mouth daily  cholecalciferol (VITAMIN D3) 1, 000 units tablet, Take 1 tablet (1, 000 Units total) by mouth daily  fluticasone (FLONASE) 50 mcg/act nasal spray, 2 sprays into each nostril daily as needed for rhinitis  Fluticasone-Salmeterol (Advair Diskus) 100-50 mcg/dose inhaler, Inhale 1 puff 2 (two) times a day Rinse mouth after use.  glucose blood test strip, Checks blood glucose 3 times a day.  Lancets (OneTouch Delica Plus [id number]) MISC, Test 1 time a day  montelukast (SINGULAIR) 10 mg tablet, Take 1 tablet (10 mg total) by mouth daily at bedtime  Multiple Vitamins-Minerals (MULTIVITAMIN ADULT PO), 1 tablet every 24 hours  pantoprazole (PROTONIX) 40 mg tablet, Take 1 tablet (40 mg total) by mouth daily  Ventolin  (90 Base) MCG/ACT inhaler, Inhale 2 puffs 4 (four) times a day    Allergies:  Seasonal Ic [Octacosanol]: Allergic Rhinitis    Past Medical History:  Asthma  Colon polyp  Diabetes mellitus (HCC)  Disease of thyroid gland  GERD (gastroesophageal reflux disease)  Hyperlipidemia  Hypertension  Obesity  Pre-diabetes  Seasonal allergies  Thyroid disease  Tinnitus    Past Surgical History:   SECTION (Triplets)  COLONOSCOPY  COLONOSCOPY W/ POLYPECTOMY (2020, repeat )  HYSTERECTOMY (age 49)  REDUCTION MAMMAPLASTY ()    Social History:  Patient is retired but occasionally works part-time. She has no history of tobacco or alcohol use.    Family History:  Patient has 4 sisters on her mother's side and 3 sisters on her father's side. No brothers. Family history of diabetes and hypertension.    Review of Systems:  Respiratory: Negative. Negative for apnea, cough, shortness of breath, and wheezing.  Cardiovascular: Negative. Negative for chest pain and palpitations.  Gastrointestinal: Negative. Negative for bowel incontinence.  Genitourinary: Negative. Negative for bladder incontinence,  "dysuria, and pelvic pain.  Musculoskeletal: Positive for back pain (midline lower back).  Skin: Negative.  Neurological: Negative. Negative for tingling, weakness, and headaches.  Psychiatric/Behavioral: Negative.    Vital Signs:  /70 (BP Location: Left arm, Patient Position: Sitting, Cuff Size: Standard)  Pulse 74  Temp 97.8 °F (36.6 °C) (Tympanic)  Resp 16  Ht 5' 2\" (1.575 m)  Wt 95.7 kg (211 lb)  SpO2 98%  BMI 38.59 kg/m²    Physical Exam:  Non-distress, normal respiratory effort. Pulse is regular. Heart without murmurs.    Lab Results:  HGBA1C: 6.0 (06/11/2024)    Imaging and Other Relevant Results:  Retina, Iris test performed today.    Assessment and Plan:  1. Type 2 diabetes mellitus with hyperglycemia, without long-term current use of insulin (HCC)  Assessment & Plan:  Lab Results: HGBA1C: 6.0 (06/11/2024)  Patient is doing better with control of hyperglycemia as indicated by the HbA1C level. She is compliant with statin therapy and kidney protection therapy. The goal in treatment is a shared decision to decrease the risk of vascular disease and its complications.  Discussed complications from uncontrolled diabetes and hypoglycemia symptoms. Emphasized the importance of following up with a diabetes educator and lifestyle modifications.  Compliance with treatment encouraged. Call if side effects occur. Bring blood sugar log book at the next appointment.  Orders:  - Comprehensive metabolic panel; Future  - IRIS Diabetic eye exam  - POCT hemoglobin A1c  - tirzepatide (Mounjaro) 7.5 MG/0.5ML; Inject 0.5 mL (7.5 mg total) under the skin every 7 days  2. Hypertension, unspecified type  Assessment & Plan:  Blood pressure is well controlled, patient will continue the same treatment. Patient understands the risks associated with HTN and the need for adequate control and adherence to therapy. Explained to patient that therapeutic measures include lifelong lifestyle modifications such as:  Sodium reduction (<2 " g/day)  Dietary Approaches to Stop Hypertension (DASH) diet (3 servings of fruit and vegetables daily, whole grains, low sodium, low-fat proteins)  Weight loss to BMI under 30 kg/m². Physical activity: 3 to 5 times/week of daily aerobic exercise for 30- to 50-minute sessions as tolerated  Avoid alcohol consumption.  Orders:  - hydroCHLOROthiazide 25 mg tablet; Take 1 tablet (25 mg total) by mouth daily  - olmesartan (BENICAR) 40 mg tablet; Take 1 tablet (40 mg total) by mouth daily  - rosuvastatin (CRESTOR) 5 mg tablet; Take 1 tablet (5 mg total) by mouth daily  3. Hypothyroidism (acquired)  Assessment & Plan:  Continue on levothyroxine, checking TSH for reassurance of well control.  Orders:  - levothyroxine 50 mcg tablet; Take 1 tablet (50 mcg total) by mouth daily  4. Need for RSV immunization  - RSVPreF3 Vac Recomb Adjuvanted 120 MCG/0.5ML SUSR; Inject 0.5 mL into a muscle 1 (one) time for 1 dose  5. Need for shingles vaccine  - Zoster Vac Recomb Adjuvanted (Shingrix) 50 MCG/0.5ML SUSR; Inject 0.5 mL into a muscle once for 1 dose Repeat dose in 2 to 6 months        Ba Jason MD   [facility]IMARY [facility]

## 2024-06-12 DIAGNOSIS — K29.60 OTHER GASTRITIS WITHOUT HEMORRHAGE, UNSPECIFIED CHRONICITY: ICD-10-CM

## 2024-06-12 RX ORDER — PANTOPRAZOLE SODIUM 40 MG/1
40 TABLET, DELAYED RELEASE ORAL DAILY
Qty: 90 TABLET | Refills: 1 | Status: SHIPPED | OUTPATIENT
Start: 2024-06-12

## 2024-07-01 ENCOUNTER — HOSPITAL ENCOUNTER (OUTPATIENT)
Dept: BONE DENSITY | Facility: CLINIC | Age: 69
Discharge: HOME | End: 2024-07-01
Payer: COMMERCIAL

## 2024-07-01 DIAGNOSIS — M85.88 OSTEOPENIA OF LUMBAR SPINE: ICD-10-CM

## 2024-07-01 PROCEDURE — 77080 DXA BONE DENSITY AXIAL: CPT

## 2024-08-08 ENCOUNTER — OFFICE VISIT (OUTPATIENT)
Dept: DENTISTRY | Facility: CLINIC | Age: 69
End: 2024-08-08

## 2024-08-08 VITALS — SYSTOLIC BLOOD PRESSURE: 104 MMHG | DIASTOLIC BLOOD PRESSURE: 67 MMHG | HEART RATE: 63 BPM | TEMPERATURE: 98.4 F

## 2024-08-08 DIAGNOSIS — K03.6 ACCRETIONS ON TEETH: ICD-10-CM

## 2024-08-08 DIAGNOSIS — Z01.20 ENCOUNTER FOR DENTAL EXAMINATION: Primary | ICD-10-CM

## 2024-08-08 DIAGNOSIS — K02.9 DENTAL CARIES: ICD-10-CM

## 2024-08-08 PROCEDURE — D0220 INTRAORAL - PERIAPICAL FIRST RADIOGRAPHIC IMAGE: HCPCS

## 2024-08-08 PROCEDURE — D0274 BITEWINGS - 4 RADIOGRAPHIC IMAGES: HCPCS

## 2024-08-08 PROCEDURE — D0230 INTRAORAL - PERIAPICAL EACH ADDITIONAL RADIOGRAPHIC IMAGE: HCPCS

## 2024-08-08 PROCEDURE — D0120 PERIODIC ORAL EVALUATION - ESTABLISHED PATIENT: HCPCS

## 2024-08-08 PROCEDURE — D1110 PROPHYLAXIS - ADULT: HCPCS

## 2024-08-08 NOTE — DENTAL PROCEDURE DETAILS
PERIODIC EXAM, ADULT PROPHY , 4 BWX and PA   REVIEWED MED HX: meds, allergies, health changes reviewed in [facility]. All consents signed.  CHIEF CONCERN: sens # 29  has been told before she may need endo  PAIN SCALE:  3  ASA CLASS:  ASA 2 - Patient with mild systemic disease with no functional limitations  PLAQUE:  mild  CALCULUS: Light  BLEEDING:  none  STAIN : None     PERIO:     Hygiene Procedures:  Scaled, Polished, Flossed    Oral Hygiene Instruction: Brushing minimum 2x daily for 2 minutes, daily flossing, Interproximal brush, and Recommended soft toothbrush only    Dispensed: Toothbrush, Toothpaste, Floss, Flossers, and Proxabrush    Visual and Tactile Intraoral/ Extraoral evaluation: Oral and Oropharyngeal cancer evaluation. No findings     Dr. DAVINA DOWLING/ [facility]      Reviewed with patient clinical and radiographic findings and patient verbalized understanding. All questions and concerns addressed.     REFERRALS: Endodontist referral provided  gave copy of x ray from 8/23 and 8/ 24    CARIES FINDINGS:   none       TREATMENT  PLAN :   NV:     Next Recall: 6 month recall   rev probe    Last BWX:   8/8/24  Last Panorex/ FMX :  11/2022

## 2024-08-27 ENCOUNTER — HOSPITAL ENCOUNTER (OUTPATIENT)
Dept: MAMMOGRAPHY | Facility: CLINIC | Age: 69
Discharge: HOME | End: 2024-08-27
Payer: COMMERCIAL

## 2024-08-27 VITALS — HEIGHT: 62 IN | BODY MASS INDEX: 38.83 KG/M2 | WEIGHT: 211 LBS

## 2024-08-27 DIAGNOSIS — R92.8 ABNORMAL MAMMOGRAM: ICD-10-CM

## 2024-08-27 PROCEDURE — 77066 DX MAMMO INCL CAD BI: CPT

## 2024-08-27 PROCEDURE — 76642 ULTRASOUND BREAST LIMITED: CPT

## 2024-08-27 PROCEDURE — G0279 TOMOSYNTHESIS, MAMMO: HCPCS

## 2024-08-28 ENCOUNTER — TELEPHONE (OUTPATIENT)
Age: 69
End: 2024-08-28

## 2024-08-28 DIAGNOSIS — Z12.31 SCREENING MAMMOGRAM FOR BREAST CANCER: Primary | ICD-10-CM

## 2024-08-28 NOTE — TELEPHONE ENCOUNTER
A. Relayed results to (patient/patient representative as listed on communication consent form) as per provider message. Patient/Patient Representative expressed understanding and did not have any further questions.                                                              Route to OFFICE CLINICAL POOL with request to: Please complete Result Management task.     Pt informed that Dr. Kendrick but a mammo order in for her to schedule for next year. Josse thank you.

## 2024-08-30 ENCOUNTER — HOSPITAL ENCOUNTER (OUTPATIENT)
Dept: RADIOLOGY | Facility: HOSPITAL | Age: 69
Discharge: HOME | End: 2024-08-30

## 2024-08-30 DIAGNOSIS — Z11.1 SCREENING EXAMINATION FOR PULMONARY TUBERCULOSIS: ICD-10-CM

## 2024-08-30 PROCEDURE — 71046 X-RAY EXAM CHEST 2 VIEWS: CPT

## 2024-08-31 ENCOUNTER — LAB (OUTPATIENT)
Dept: LAB | Facility: HOSPITAL | Age: 69
End: 2024-08-31
Payer: COMMERCIAL

## 2024-08-31 DIAGNOSIS — E11.65 TYPE 2 DIABETES MELLITUS WITH HYPERGLYCEMIA, WITHOUT LONG-TERM CURRENT USE OF INSULIN (HCC): ICD-10-CM

## 2024-08-31 LAB
ALBUMIN SERPL BCG-MCNC: 3.9 G/DL (ref 3.5–5)
ALP SERPL-CCNC: 56 U/L (ref 34–104)
ALT SERPL W P-5'-P-CCNC: 11 U/L (ref 7–52)
ANION GAP SERPL CALCULATED.3IONS-SCNC: 8 MMOL/L (ref 4–13)
AST SERPL W P-5'-P-CCNC: 14 U/L (ref 13–39)
BILIRUB SERPL-MCNC: 0.61 MG/DL (ref 0.2–1)
BUN SERPL-MCNC: 14 MG/DL (ref 5–25)
CALCIUM SERPL-MCNC: 9.3 MG/DL (ref 8.4–10.2)
CHLORIDE SERPL-SCNC: 104 MMOL/L (ref 96–108)
CO2 SERPL-SCNC: 28 MMOL/L (ref 21–32)
CREAT SERPL-MCNC: 0.69 MG/DL (ref 0.6–1.3)
GFR SERPL CREATININE-BSD FRML MDRD: 89 ML/MIN/1.73SQ M
GLUCOSE SERPL-MCNC: 100 MG/DL (ref 65–140)
POTASSIUM SERPL-SCNC: 3.8 MMOL/L (ref 3.5–5.3)
PROT SERPL-MCNC: 6.8 G/DL (ref 6.4–8.4)
SODIUM SERPL-SCNC: 140 MMOL/L (ref 135–147)

## 2024-08-31 PROCEDURE — 80053 COMPREHEN METABOLIC PANEL: CPT

## 2024-08-31 PROCEDURE — 36415 COLL VENOUS BLD VENIPUNCTURE: CPT

## 2024-09-11 DIAGNOSIS — I10 HYPERTENSION, UNSPECIFIED TYPE: ICD-10-CM

## 2024-09-11 NOTE — TELEPHONE ENCOUNTER
Pt came to office she would like medication sent to pharmacy, pt could like cream sent to the pharmacy hydrocortisone 2.5 % cream she was giving before

## 2024-09-13 DIAGNOSIS — I10 HYPERTENSION, UNSPECIFIED TYPE: ICD-10-CM

## 2024-09-13 DIAGNOSIS — L20.9 ATOPIC DERMATITIS, UNSPECIFIED TYPE: Primary | ICD-10-CM

## 2024-09-13 RX ORDER — TRIAMCINOLONE ACETONIDE 1 MG/G
CREAM TOPICAL 2 TIMES DAILY PRN
Qty: 30 G | Refills: 0 | Status: SHIPPED | OUTPATIENT
Start: 2024-09-13

## 2024-09-13 RX ORDER — HYDROCHLOROTHIAZIDE 25 MG/1
25 TABLET ORAL DAILY
Qty: 90 TABLET | Refills: 1 | Status: SHIPPED | OUTPATIENT
Start: 2024-09-13

## 2024-09-13 NOTE — TELEPHONE ENCOUNTER
Patient following up on refill request. She only has enough medication for tonight and then she will be out.

## 2024-09-15 RX ORDER — HYDROCHLOROTHIAZIDE 25 MG/1
25 TABLET ORAL DAILY
Qty: 90 TABLET | Refills: 0 | Status: SHIPPED | OUTPATIENT
Start: 2024-09-15

## 2024-11-19 ENCOUNTER — TELEPHONE (OUTPATIENT)
Age: 69
End: 2024-11-19

## 2024-11-19 DIAGNOSIS — G89.29 CHRONIC PAIN OF RIGHT KNEE: Primary | ICD-10-CM

## 2024-11-19 DIAGNOSIS — M17.11 PRIMARY OSTEOARTHRITIS OF RIGHT KNEE: ICD-10-CM

## 2024-11-19 DIAGNOSIS — M25.561 CHRONIC PAIN OF RIGHT KNEE: Primary | ICD-10-CM

## 2024-11-19 NOTE — TELEPHONE ENCOUNTER
Caller: Wanda    Doctor: Vladimir Schulz    Reason for call: Would like to start prior auth for next Durolane inj of Right knee. Last injection 4/29/2024  [employer]     Call back#: [phone] or [phone] (can leave a message on wither line if unavailable

## 2024-12-03 ENCOUNTER — TELEPHONE (OUTPATIENT)
Age: 69
End: 2024-12-03

## 2024-12-03 NOTE — TELEPHONE ENCOUNTER
I received a Care Request from patient to schedule for:    First Name: Wanda  Last Name: Sarbjit  Email: [email or web address]  Phone: [phone]  Subject: Request an appointment  Where does it hurt? Right knee severe pain    I lvm to [facility] at [phone].

## 2024-12-11 ENCOUNTER — TELEPHONE (OUTPATIENT)
Dept: FAMILY MEDICINE CLINIC | Facility: CLINIC | Age: 69
End: 2024-12-11

## 2024-12-12 DIAGNOSIS — E11.65 TYPE 2 DIABETES MELLITUS WITH HYPERGLYCEMIA, WITHOUT LONG-TERM CURRENT USE OF INSULIN (HCC): ICD-10-CM

## 2024-12-13 ENCOUNTER — PROCEDURE VISIT (OUTPATIENT)
Dept: OBGYN CLINIC | Facility: MEDICAL CENTER | Age: 69
End: 2024-12-13
Payer: COMMERCIAL

## 2024-12-13 VITALS
BODY MASS INDEX: 38.83 KG/M2 | WEIGHT: 211 LBS | SYSTOLIC BLOOD PRESSURE: 132 MMHG | DIASTOLIC BLOOD PRESSURE: 84 MMHG | HEART RATE: 82 BPM | HEIGHT: 62 IN

## 2024-12-13 DIAGNOSIS — G89.29 CHRONIC PAIN OF RIGHT KNEE: Primary | ICD-10-CM

## 2024-12-13 DIAGNOSIS — M25.561 CHRONIC PAIN OF RIGHT KNEE: Primary | ICD-10-CM

## 2024-12-13 DIAGNOSIS — M17.11 PRIMARY OSTEOARTHRITIS OF RIGHT KNEE: ICD-10-CM

## 2024-12-13 PROCEDURE — 20610 DRAIN/INJ JOINT/BURSA W/O US: CPT | Performed by: EMERGENCY MEDICINE

## 2024-12-13 NOTE — PROGRESS NOTES
Assessment/Plan:    Diagnoses and all orders for this visit:    Chronic pain of right knee  -     Large joint arthrocentesis: R knee  -     Ambulatory Referral to Orthopedic Surgery; Future    Primary osteoarthritis of right knee  -     Large joint arthrocentesis: R knee  -     Ambulatory Referral to Orthopedic Surgery; Future    Repeat Visco injection right knee provided today  Previous Visco injection 4/2024  No benefit from prior CSI  Referral to orthopedic surgeon to discuss TKA    Return if symptoms worsen or fail to improve.      Subjective:   Patient ID: Wanda Schaffer is a 69 y.o. female.    Patient presents for repeat Visco injection right knee   No previous benefit from CSI        Review of Systems    The following portions of the patient's chart were reviewed and updated as appropriate:   Allergy:    Allergies   Allergen Reactions    Seasonal Ic [Octacosanol] Allergic Rhinitis       Medications:    Current Outpatient Medications:     albuterol (2.5 mg/3 mL) 0.083 % nebulizer solution, USE 1 AMPULE VIA NEBULIZER EVERY 6 HOURS AS NEEDED FOR WHEEZING OR SHORTNESS OF BREATH, Disp: 300 mL, Rfl: 0    calcium carbonate-vitamin D (OSCAL-D) 500 mg-200 units per tablet, Take 1 tablet by mouth daily with breakfast, Disp: 60 tablet, Rfl: 5    cetirizine (ZyrTEC) 10 mg tablet, Take 1 tablet (10 mg total) by mouth daily, Disp: 30 tablet, Rfl: 0    cholecalciferol (VITAMIN D3) 1,000 units tablet, Take 1 tablet (1,000 Units total) by mouth daily, Disp: 30 tablet, Rfl: 5    fluticasone (FLONASE) 50 mcg/act nasal spray, 2 sprays into each nostril daily as needed for rhinitis, Disp: 16 g, Rfl: 1    Fluticasone-Salmeterol (Advair Diskus) 100-50 mcg/dose inhaler, Inhale 1 puff 2 (two) times a day Rinse mouth after use., Disp: 60 blister, Rfl: 1    glucose blood test strip, Checks blood glucose 3 times a day., Disp: 100 each, Rfl: 3    hydroCHLOROthiazide 25 mg tablet, Take 1 tablet (25 mg total) by mouth daily, Disp: 90  tablet, Rfl: 0    hydroCHLOROthiazide 25 mg tablet, TAKE ONE TABLET BY MOUTH EVERY MORNING, Disp: 90 tablet, Rfl: 1    Lancets (OneTouch Delica Plus [id number]) MISC, Test 1 time a day, Disp: 100 each, Rfl: 0    levothyroxine 50 mcg tablet, Take 1 tablet (50 mcg total) by mouth daily, Disp: 90 tablet, Rfl: 3    montelukast (SINGULAIR) 10 mg tablet, Take 1 tablet (10 mg total) by mouth daily at bedtime, Disp: 90 tablet, Rfl: 1    Multiple Vitamins-Minerals (MULTIVITAMIN ADULT PO), 1 tablet every 24 hours, Disp: , Rfl:     olmesartan (BENICAR) 40 mg tablet, Take 1 tablet (40 mg total) by mouth daily, Disp: 90 tablet, Rfl: 3    pantoprazole (PROTONIX) 40 mg tablet, Take 1 tablet (40 mg total) by mouth daily, Disp: 90 tablet, Rfl: 1    rosuvastatin (CRESTOR) 5 mg tablet, Take 1 tablet (5 mg total) by mouth daily, Disp: 90 tablet, Rfl: 3    Tirzepatide 7.5 MG/0.5ML SOAJ, Inject 7.5 mg under the skin once a week, Disp: 2 mL, Rfl: 5    triamcinolone (KENALOG) 0.1 % cream, Apply topically 2 (two) times a day as needed (ezcema), Disp: 30 g, Rfl: 0    Ventolin  (90 Base) MCG/ACT inhaler, Inhale 2 puffs 4 (four) times a day, Disp: 18 g, Rfl: 5    Patient Active Problem List   Diagnosis    Hypertension    Prediabetes    Morbid obesity with BMI of 40.0-44.9, adult (HCC)    Mixed hyperlipidemia    Seasonal allergic rhinitis    Hypothyroidism (acquired)    Asthma    Gastritis without bleeding    Encounter for general adult medical examination with abnormal findings    Atopic dermatitis    Interstitial lung disease (HCC)    Abnormal CT scan of lung    Restrictive lung disease    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HCC)    Kidney congenitally absent, left    Heel pain    Varicose veins of lower extremity    Ingrown toenail    Osteopenia of lumbar spine    Chronic bilateral low back pain without sciatica    Snoring    Type 2 diabetes mellitus with hyperglycemia, without long-term current use of insulin (HCC)    Latent tuberculosis  "treated     Right knee pain, unspecified chronicity    Primary osteoarthritis of right knee       Objective:  /84   Pulse 82   Ht 5' 2\" (1.575 m)   Wt 95.7 kg (211 lb)   BMI 38.59 kg/m²     Ortho Exam    Physical Exam      Neurologic Exam    Large joint arthrocentesis: R knee  Universal Protocol:  Consent: Verbal consent obtained.  Risks and benefits: risks, benefits and alternatives were discussed  Consent given by: patient  Time out: Immediately prior to procedure a \"time out\" was called to verify the correct patient, procedure, equipment, support staff and site/side marked as required.  Timeout called at: 2024 10:48 AM.  Patient understanding: patient states understanding of the procedure being performed  Test results: test results available and properly labeled  Site marked: the operative site was marked  Patient identity confirmed: verbally with patient  Supporting Documentation  Indications: pain   Procedure Details  Location: knee - R knee  Preparation: Patient was prepped and draped in the usual sterile fashion  Needle size: 20 G  Ultrasound guidance: no  Approach: anterolateral  Medications administered: 3 mL sodium hyaluronate 60 MG/3ML    Patient tolerance: patient tolerated the procedure well with no immediate complications  Dressing:  Sterile dressing applied    No erythema of knees        I have personally reviewed the written report of the pertinent studies.             Past Medical History:   Diagnosis Date    Asthma     Colon polyp     Diabetes mellitus (HCC)     Disease of thyroid gland     GERD (gastroesophageal reflux disease)     Hyperlipidemia     Hypertension     Obesity     Pre-diabetes     Seasonal allergies     Thyroid disease     Tinnitus        Past Surgical History:   Procedure Laterality Date     SECTION      Triplets    COLONOSCOPY      COLONOSCOPY W/ POLYPECTOMY  2020    repeat     HYSTERECTOMY      age 49    REDUCTION MAMMAPLASTY  2009       Social " History     Socioeconomic History    Marital status: /Civil Union     Spouse name: Not on file    Number of children: Not on file    Years of education: Not on file    Highest education level: Not on file   Occupational History    Not on file   Tobacco Use    Smoking status: Former     Current packs/day: 0.00     Average packs/day: 0.5 packs/day for 38.2 years (19.1 ttl pk-yrs)     Types: Cigarettes     Start date:      Quit date: 3/18/2010     Years since quittin.7     Passive exposure: Past    Smokeless tobacco: Never   Vaping Use    Vaping status: Never Used   Substance and Sexual Activity    Alcohol use: Yes     Alcohol/week: 4.0 standard drinks of alcohol     Types: 4 Glasses of wine per week     Comment: social    Drug use: No    Sexual activity: Not Currently     Birth control/protection: None   Other Topics Concern    Not on file   Social History Narrative    Not on file     Social Drivers of Health     Financial Resource Strain: Low Risk  (1/3/2024)    Overall Financial Resource Strain (CARDIA)     Difficulty of Paying Living Expenses: Not hard at all   Food Insecurity: No Food Insecurity (1/3/2024)    Nursing - Inadequate Food Risk Classification     Worried About Running Out of Food in the Last Year: Never true     Ran Out of Food in the Last Year: Never true     Ran Out of Food in the Last Year: Not on file   Transportation Needs: No Transportation Needs (1/3/2024)    PRAPARE - Transportation     Lack of Transportation (Medical): No     Lack of Transportation (Non-Medical): No   Physical Activity: Not on file   Stress: Not on file   Social Connections: Not on file   Intimate Partner Violence: Not on file   Housing Stability: Not on file       Family History   Problem Relation Age of Onset    Coronary artery disease Maternal Grandfather     Breast cancer Maternal Aunt 58    Stroke Mother     No Known Problems Father     No Known Problems Sister     No Known Problems Daughter     No Known  Problems Maternal Grandmother     No Known Problems Paternal Grandmother     No Known Problems Paternal Grandfather     No Known Problems Daughter     No Known Problems Daughter     No Known Problems Sister     No Known Problems Sister     No Known Problems Sister     No Known Problems Sister     No Known Problems Sister     No Known Problems Sister     No Known Problems Maternal Aunt     No Known Problems Maternal Aunt     No Known Problems Maternal Aunt     No Known Problems Maternal Aunt     No Known Problems Maternal Aunt     No Known Problems Paternal Aunt     No Known Problems Paternal Aunt

## 2024-12-19 ENCOUNTER — OFFICE VISIT (OUTPATIENT)
Dept: FAMILY MEDICINE CLINIC | Facility: CLINIC | Age: 69
End: 2024-12-19
Payer: COMMERCIAL

## 2024-12-19 VITALS
SYSTOLIC BLOOD PRESSURE: 110 MMHG | DIASTOLIC BLOOD PRESSURE: 80 MMHG | WEIGHT: 209 LBS | RESPIRATION RATE: 16 BRPM | TEMPERATURE: 98 F | HEIGHT: 62 IN | BODY MASS INDEX: 38.46 KG/M2 | OXYGEN SATURATION: 98 % | HEART RATE: 86 BPM

## 2024-12-19 DIAGNOSIS — E11.65 TYPE 2 DIABETES MELLITUS WITH HYPERGLYCEMIA, WITHOUT LONG-TERM CURRENT USE OF INSULIN (HCC): ICD-10-CM

## 2024-12-19 DIAGNOSIS — Z00.01 ENCOUNTER FOR GENERAL ADULT MEDICAL EXAMINATION WITH ABNORMAL FINDINGS: Primary | ICD-10-CM

## 2024-12-19 DIAGNOSIS — E03.9 HYPOTHYROIDISM (ACQUIRED): ICD-10-CM

## 2024-12-19 DIAGNOSIS — M17.11 PRIMARY OSTEOARTHRITIS OF RIGHT KNEE: ICD-10-CM

## 2024-12-19 DIAGNOSIS — Z23 NEED FOR SHINGLES VACCINE: ICD-10-CM

## 2024-12-19 DIAGNOSIS — I10 HYPERTENSION, UNSPECIFIED TYPE: ICD-10-CM

## 2024-12-19 DIAGNOSIS — J45.909 MODERATE ASTHMA, UNSPECIFIED WHETHER COMPLICATED, UNSPECIFIED WHETHER PERSISTENT: ICD-10-CM

## 2024-12-19 PROCEDURE — G0439 PPPS, SUBSEQ VISIT: HCPCS | Performed by: FAMILY MEDICINE

## 2024-12-19 PROCEDURE — 99214 OFFICE O/P EST MOD 30 MIN: CPT | Performed by: FAMILY MEDICINE

## 2024-12-19 RX ORDER — OLMESARTAN MEDOXOMIL 40 MG/1
40 TABLET ORAL DAILY
Qty: 90 TABLET | Refills: 3 | Status: SHIPPED | OUTPATIENT
Start: 2024-12-19

## 2024-12-19 RX ORDER — ZOSTER VACCINE RECOMBINANT, ADJUVANTED 50 MCG/0.5
0.5 KIT INTRAMUSCULAR ONCE
Qty: 1 EACH | Refills: 1 | Status: SHIPPED | OUTPATIENT
Start: 2024-12-19 | End: 2024-12-19

## 2024-12-19 RX ORDER — ROSUVASTATIN CALCIUM 5 MG/1
5 TABLET, COATED ORAL DAILY
Qty: 90 TABLET | Refills: 3 | Status: SHIPPED | OUTPATIENT
Start: 2024-12-19

## 2024-12-19 RX ORDER — LEVOTHYROXINE SODIUM 50 UG/1
50 TABLET ORAL DAILY
Qty: 90 TABLET | Refills: 3 | Status: SHIPPED | OUTPATIENT
Start: 2024-12-19

## 2024-12-19 NOTE — ASSESSMENT & PLAN NOTE
Lab Results   Component Value Date    HGBA1C 6.0 06/11/2024   1. Type 2 Diabetes Mellitus (A1c 6.0% as of 6/11/24)  - Continue current management as glycemic control is excellent  - Emphasized importance of continuing statin therapy due to increased cardiovascular risk        Orders:    Albumin / creatinine urine ratio; Future    POCT hemoglobin A1c    Hemoglobin A1C; Future    Tirzepatide 7.5 MG/0.5ML SOAJ; Inject 7.5 mg under the skin once a week    Lipid Panel with Direct LDL reflex; Future

## 2024-12-19 NOTE — PROGRESS NOTES
Adult Annual Physical  Name: Wanda Schaffer      : 1955      MRN: [id number]  Encounter Provider: Ba Jason MD  Encounter Date: 2024   Encounter department: [facility]    Assessment & Plan  Encounter for general adult medical examination with abnormal findings  Wanda is here for a physical exam. I found her without any distress. The patient has the following chronic conditions   Patient Active Problem List   Diagnosis    Hypertension    Mixed hyperlipidemia    Hypothyroidism (acquired)    Asthma    Interstitial lung disease (HCC)    Abnormal CT scan of lung    Restrictive lung disease    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HCC)    Kidney congenitally absent, left    Osteopenia of lumbar spine    Type 2 diabetes mellitus with hyperglycemia, without long-term current use of insulin (HCC)    Latent tuberculosis treated     Primary osteoarthritis of right knee   .   I recommended exercising at least 3 1/2  hours per week and maintaining a healthy diet with low carbohydrates and saturated fat.    I gave her  information about lifestyle modification.    The patient understands the importance of an annual physical exam.        Orders:    CBC and differential; Future    Type 2 diabetes mellitus with hyperglycemia, without long-term current use of insulin (HCC)    Lab Results   Component Value Date    HGBA1C 6.0 2024   1. Type 2 Diabetes Mellitus (A1c 6.0% as of 24)  - Continue current management as glycemic control is excellent  - Emphasized importance of continuing statin therapy due to increased cardiovascular risk        Orders:    Albumin / creatinine urine ratio; Future    POCT hemoglobin A1c    Hemoglobin A1C; Future    Tirzepatide 7.5 MG/0.5ML SOAJ; Inject 7.5 mg under the skin once a week    Lipid Panel with Direct LDL reflex; Future    Hypothyroidism (acquired)    Orders:    TSH, 3rd generation; Future    levothyroxine 50 mcg tablet; Take 1 tablet (50  mcg total) by mouth daily    Hypertension, unspecified type    Orders:    olmesartan (BENICAR) 40 mg tablet; Take 1 tablet (40 mg total) by mouth daily    rosuvastatin (CRESTOR) 5 mg tablet; Take 1 tablet (5 mg total) by mouth daily    Moderate asthma, unspecified whether complicated, unspecified whether persistent  2. Asthma  - Discontinue albuterol per 2022 guidelines due to potential long-term lung damage  - Prescribed Advair 2-3 times daily as needed  - Counseled on proper inhaler technique and oral hygiene due to diabetes-related fungal risk             Primary osteoarthritis of right knee  5. Osteoarthritis - Right Knee  - Recent knee injection last Friday  - Follow-up with orthopedics scheduled  - Consider surgical evaluation if symptoms persist             Need for shingles vaccine  6. Preventive Care  - Due for shingles vaccine - referred to pharmacy  - Continue calcium and vitamin D supplementation  - Mammogram completed recently - normal results  - Regular gynecological follow-up maintained  Orders:    Zoster Vac Recomb Adjuvanted (Shingrix) 50 MCG/0.5ML SUSR; Inject 0.5 mL into a muscle once for 1 dose Repeat dose in 2 to 6 months    Immunizations and preventive care screenings were discussed with patient today. Appropriate education was printed on patient's after visit summary.    Counseling:  Dental Health: discussed importance of regular tooth brushing, flossing, and dental visits.  Exercise: the importance of regular exercise/physical activity was discussed. Recommend exercise 3-5 times per week for at least 30 minutes.          History of Present Illness     Adult Annual Physical:  Patient presents for annual physical.     69-year-old female is here for PE, not having any acute distress., resenting with history of diabetes. Patient reports overall feeling well with recent weight loss. Main concern is asthma symptoms, particularly during cold weather. Reports compliance with blood pressure  "medications due to family history of stroke. Taking thyroid medication properly on weekdays. Recently received knee injection for osteoarthritis with some post-procedure swelling. Maintains active lifestyle with regular walking. Reports taking B12, multivitamin supplements, but inconsistent with vitamin D due to pill size. Denies any new concerns. Recent mammogram reported as normal. Maintains regular preventive care including gynecological follow-up. Currently using nebulizer for breathing issues. Reports occasional wine consumption. Weight loss noted and stable at 220 lbs, down from 223 lbs in February..     Depression Screening:  - PHQ-2 Score: 0    Review of Systems  Pertinent Medical History   Patient Active Problem List   Diagnosis    Hypertension    Mixed hyperlipidemia    Hypothyroidism (acquired)    Asthma    Interstitial lung disease (HCC)    Abnormal CT scan of lung    Restrictive lung disease    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HCC)    Kidney congenitally absent, left    Osteopenia of lumbar spine    Type 2 diabetes mellitus with hyperglycemia, without long-term current use of insulin (HCC)    Latent tuberculosis treated 2005    Primary osteoarthritis of right knee       Objective   /80 (BP Location: Left arm, Patient Position: Sitting, Cuff Size: Standard)   Pulse 86   Temp 98 °F (36.7 °C) (Tympanic)   Resp 16   Ht 5' 2\" (1.575 m)   Wt 94.8 kg (209 lb)   SpO2 98%   BMI 38.23 kg/m²     Physical Exam  Cardiovascular:      Pulses: no weak pulses.           Dorsalis pedis pulses are 2+ on the right side and 2+ on the left side.        Posterior tibial pulses are 2+ on the right side and 2+ on the left side.   Feet:      Right foot:      Skin integrity: Dry skin present. No ulcer, skin breakdown, erythema, warmth or callus.      Left foot:      Skin integrity: Dry skin present. No ulcer, skin breakdown, erythema, warmth or callus.       Administrative Statements   I have spent a total time of 35 " minutes in caring for this patient on the day of the visit/encounter including Diagnostic results, Risks and benefits of tx options, and Risk factor reductions. Topics discussed with the patient / family include symptom assessment and management, medication review, and medication adjustment.Patient's shoes and socks removed.    Right Foot/Ankle   Right Foot Inspection  Skin Exam: skin normal, skin intact and dry skin. No warmth, no callus, no erythema, no maceration, no abnormal color, no pre-ulcer, no ulcer and no callus.     Toe Exam: ROM and strength within normal limits. No swelling, no tenderness, erythema and  no right toe deformity    Sensory   Vibration: intact  Proprioception: intact  Monofilament testing: intact    Vascular  Capillary refills: < 3 seconds  The right DP pulse is 2+. The right PT pulse is 2+.     Left Foot/Ankle  Left Foot Inspection  Skin Exam: skin normal, skin intact and dry skin. No warmth, no erythema, no maceration, normal color, no pre-ulcer, no ulcer and no callus.     Toe Exam: ROM and strength within normal limits. No swelling, no tenderness, no erythema and no left toe deformity.     Sensory   Vibration: intact  Proprioception: intact  Monofilament testing: intact    Vascular  Capillary refills: < 3 seconds  The left DP pulse is 2+. The left PT pulse is 2+.     Assign Risk Category  No deformity present  No loss of protective sensation  No weak pulses  Risk: 0

## 2024-12-20 PROBLEM — I83.90 VARICOSE VEINS OF LOWER EXTREMITY: Status: RESOLVED | Noted: 2022-10-14 | Resolved: 2024-12-20

## 2024-12-20 PROBLEM — M79.673 HEEL PAIN: Status: RESOLVED | Noted: 2022-05-09 | Resolved: 2024-12-20

## 2024-12-20 PROBLEM — M54.50 CHRONIC BILATERAL LOW BACK PAIN WITHOUT SCIATICA: Status: RESOLVED | Noted: 2022-12-14 | Resolved: 2024-12-20

## 2024-12-20 PROBLEM — L20.9 ATOPIC DERMATITIS: Status: RESOLVED | Noted: 2021-01-13 | Resolved: 2024-12-20

## 2024-12-20 PROBLEM — M25.561 RIGHT KNEE PAIN, UNSPECIFIED CHRONICITY: Status: RESOLVED | Noted: 2024-04-15 | Resolved: 2024-12-20

## 2024-12-20 PROBLEM — E66.01 MORBID OBESITY WITH BMI OF 40.0-44.9, ADULT (HCC): Status: RESOLVED | Noted: 2018-11-15 | Resolved: 2024-12-20

## 2024-12-20 PROBLEM — Z00.01 ENCOUNTER FOR GENERAL ADULT MEDICAL EXAMINATION WITH ABNORMAL FINDINGS: Status: RESOLVED | Noted: 2021-01-13 | Resolved: 2024-12-20

## 2024-12-20 PROBLEM — K29.70 GASTRITIS WITHOUT BLEEDING: Status: RESOLVED | Noted: 2020-10-16 | Resolved: 2024-12-20

## 2024-12-20 PROBLEM — R06.83 SNORING: Status: RESOLVED | Noted: 2023-05-11 | Resolved: 2024-12-20

## 2024-12-20 PROBLEM — J30.2 SEASONAL ALLERGIC RHINITIS: Status: RESOLVED | Noted: 2019-05-20 | Resolved: 2024-12-20

## 2024-12-20 PROBLEM — G89.29 CHRONIC BILATERAL LOW BACK PAIN WITHOUT SCIATICA: Status: RESOLVED | Noted: 2022-12-14 | Resolved: 2024-12-20

## 2024-12-20 PROBLEM — L60.0 INGROWN TOENAIL: Status: RESOLVED | Noted: 2022-10-14 | Resolved: 2024-12-20

## 2024-12-20 PROBLEM — R73.03 PREDIABETES: Status: RESOLVED | Noted: 2018-10-18 | Resolved: 2024-12-20

## 2024-12-20 NOTE — ASSESSMENT & PLAN NOTE
Wanda is here for a physical exam. I found her without any distress. The patient has the following chronic conditions   Patient Active Problem List   Diagnosis    Hypertension    Mixed hyperlipidemia    Hypothyroidism (acquired)    Asthma    Interstitial lung disease (HCC)    Abnormal CT scan of lung    Restrictive lung disease    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HCC)    Kidney congenitally absent, left    Osteopenia of lumbar spine    Type 2 diabetes mellitus with hyperglycemia, without long-term current use of insulin (HCC)    Latent tuberculosis treated 2005    Primary osteoarthritis of right knee   .   I recommended exercising at least 3 1/2  hours per week and maintaining a healthy diet with low carbohydrates and saturated fat.    I gave her  information about lifestyle modification.    The patient understands the importance of an annual physical exam.        Orders:    CBC and differential; Future

## 2024-12-20 NOTE — ASSESSMENT & PLAN NOTE
2. Asthma  - Discontinue albuterol per 2022 guidelines due to potential long-term lung damage  - Prescribed Advair 2-3 times daily as needed  - Counseled on proper inhaler technique and oral hygiene due to diabetes-related fungal risk

## 2024-12-20 NOTE — TELEPHONE ENCOUNTER
PA for Mounjaro 7.5 MG/0.5ML APPROVED     Date(s) approved: 12/18/2024-12/18/2025    Case #[id number]     Patient advised by          [x]MyChart Message-Communication consent incomplete  []Phone call   []LMOM  []L/M to call office as no active Communication consent on file  []Unable to leave detailed message as VM not approved on Communication consent       Pharmacy advised by    [x]Fax  []Phone call    Approval letter scanned into [employer] Yes

## 2024-12-20 NOTE — ASSESSMENT & PLAN NOTE
5. Osteoarthritis - Right Knee  - Recent knee injection last Friday  - Follow-up with orthopedics scheduled  - Consider surgical evaluation if symptoms persist

## 2024-12-20 NOTE — ASSESSMENT & PLAN NOTE
Orders:    TSH, 3rd generation; Future    levothyroxine 50 mcg tablet; Take 1 tablet (50 mcg total) by mouth daily

## 2024-12-20 NOTE — ASSESSMENT & PLAN NOTE
Orders:    olmesartan (BENICAR) 40 mg tablet; Take 1 tablet (40 mg total) by mouth daily    rosuvastatin (CRESTOR) 5 mg tablet; Take 1 tablet (5 mg total) by mouth daily

## 2025-01-08 ENCOUNTER — TELEPHONE (OUTPATIENT)
Dept: OBGYN CLINIC | Facility: CLINIC | Age: 70
End: 2025-01-08

## 2025-01-08 NOTE — TELEPHONE ENCOUNTER
Voicemail:My name is Tolu Coulter. I have an appointment for today for 11:00. I'm canceling my appointment for today. My YOB: 1955. My phone number is [phone] josh Soriano.    Patient was called and appt has been rescheduled.

## 2025-01-16 ENCOUNTER — OFFICE VISIT (OUTPATIENT)
Dept: DENTISTRY | Facility: CLINIC | Age: 70
End: 2025-01-16

## 2025-01-16 VITALS — HEART RATE: 104 BPM | SYSTOLIC BLOOD PRESSURE: 128 MMHG | DIASTOLIC BLOOD PRESSURE: 84 MMHG

## 2025-01-16 DIAGNOSIS — K08.89 LOSS OF RETENTION OF DENTAL CROWN: Primary | ICD-10-CM

## 2025-01-16 PROCEDURE — D0140 LIMITED ORAL EVALUATION - PROBLEM FOCUSED: HCPCS

## 2025-01-16 NOTE — PROGRESS NOTES
Procedure Details   - LIMITED ORAL EVALUATION - PROBLEM FOCUSED  Limited Exam    Wanda Schaffer 69 y.o. female presents with self to [place] for Limited exam  PMH reviewed, no changes, ASA III.     Chief complaint:  My implant is loose    Consent:  Discussed that limited exam focuses on problem area, and same day tx is not guaranteed.  Patient explained to if they wish to have anything else evaluated, they need to return to the practice at which they are a patient of record or schedule a comprehensive exam afterwards.  Patient understands and consent was given by self via verbal consent.    Subjective history:   Pt Wanda Schaffer was last seen in [place] on 8/8/2024 for a periodic exam. She reports that her implant was placed at [facility] more than 10 years ago. Pt explains that her implant started feeling loose last Friday 1/10/2025. There is no pain nor any sensitivity involved. She is able to move the implant with her tongue and it has been bothering her. #12 implant is of an unknown brand.       Objective clinical findings:   Oral cancer screening: normal.   Extraoral exam: no remarkable findings.  Intraoral exam: loose #12 implant crown     Assessment:  #12 has loose implant crown     Plan:   Access and re-torque #12 implant crown    Referral(s): None needed.  Rx: None.    Patient dismissed ambulatory and alert.    NV: access and re-torque #12 implant crown (90 mins as per Dr. SILVA).    Attending: Dr. Peoples examined pt.

## 2025-01-16 NOTE — DENTAL PROCEDURE DETAILS
Limited Exam    Wanda Schaffer 69 y.o. female presents with self to [facility] for Limited exam  PMH reviewed, no changes, ASA III.     Chief complaint:  My implant is loose    Consent:  Discussed that limited exam focuses on problem area, and same day tx is not guaranteed.  Patient explained to if they wish to have anything else evaluated, they need to return to the practice at which they are a patient of record or schedule a comprehensive exam afterwards.  Patient understands and consent was given by self via verbal consent.    Subjective history:   Pt Wanda Schaffer was last seen in [place] on 8/8/2024 for a periodic exam. She reports that her implant was placed at [facility] more than 10 years ago. Pt explains that her implant started feeling loose last Friday 1/10/2025. There is no pain nor any sensitivity involved. She is able to move the implant with her tongue and it has been bothering her. #12 implant is of an unknown brand.       Objective clinical findings:   Oral cancer screening: normal.   Extraoral exam: no remarkable findings.  Intraoral exam: loose #12 implant crown     Assessment:  #12 has loose implant crown     Plan:   Access and re-torque #12 implant crown    Referral(s): None needed.  Rx: None.    Patient dismissed ambulatory and alert.    NV: access and re-torque #12 implant crown (90 mins as per Dr. SILVA).    Attending: Dr. Peoples examined pt.

## 2025-01-23 ENCOUNTER — OFFICE VISIT (OUTPATIENT)
Dept: OBGYN CLINIC | Facility: MEDICAL CENTER | Age: 70
End: 2025-01-23
Payer: COMMERCIAL

## 2025-01-23 ENCOUNTER — APPOINTMENT (OUTPATIENT)
Dept: RADIOLOGY | Facility: MEDICAL CENTER | Age: 70
End: 2025-01-23
Payer: COMMERCIAL

## 2025-01-23 VITALS — WEIGHT: 200 LBS | BODY MASS INDEX: 36.8 KG/M2 | HEIGHT: 62 IN

## 2025-01-23 DIAGNOSIS — M17.11 PRIMARY OSTEOARTHRITIS OF RIGHT KNEE: Primary | ICD-10-CM

## 2025-01-23 DIAGNOSIS — M25.561 CHRONIC PAIN OF RIGHT KNEE: ICD-10-CM

## 2025-01-23 DIAGNOSIS — G89.29 CHRONIC PAIN OF RIGHT KNEE: ICD-10-CM

## 2025-01-23 DIAGNOSIS — M25.561 RIGHT KNEE PAIN, UNSPECIFIED CHRONICITY: ICD-10-CM

## 2025-01-23 DIAGNOSIS — Z01.89 ENCOUNTER FOR LOWER EXTREMITY COMPARISON IMAGING STUDY: ICD-10-CM

## 2025-01-23 PROCEDURE — 99244 OFF/OP CNSLTJ NEW/EST MOD 40: CPT | Performed by: ORTHOPAEDIC SURGERY

## 2025-01-23 PROCEDURE — 73564 X-RAY EXAM KNEE 4 OR MORE: CPT

## 2025-01-23 PROCEDURE — 20610 DRAIN/INJ JOINT/BURSA W/O US: CPT | Performed by: ORTHOPAEDIC SURGERY

## 2025-01-23 PROCEDURE — 73560 X-RAY EXAM OF KNEE 1 OR 2: CPT

## 2025-01-23 RX ORDER — BUPIVACAINE HYDROCHLORIDE 2.5 MG/ML
2 INJECTION, SOLUTION INFILTRATION; PERINEURAL
Status: COMPLETED | OUTPATIENT
Start: 2025-01-23 | End: 2025-01-23

## 2025-01-23 RX ORDER — TRIAMCINOLONE ACETONIDE 40 MG/ML
40 INJECTION, SUSPENSION INTRA-ARTICULAR; INTRAMUSCULAR
Status: COMPLETED | OUTPATIENT
Start: 2025-01-23 | End: 2025-01-23

## 2025-01-23 RX ADMIN — BUPIVACAINE HYDROCHLORIDE 2 ML: 2.5 INJECTION, SOLUTION INFILTRATION; PERINEURAL at 11:00

## 2025-01-23 RX ADMIN — TRIAMCINOLONE ACETONIDE 40 MG: 40 INJECTION, SUSPENSION INTRA-ARTICULAR; INTRAMUSCULAR at 11:00

## 2025-01-28 DIAGNOSIS — J45.909 MODERATE ASTHMA, UNSPECIFIED WHETHER COMPLICATED, UNSPECIFIED WHETHER PERSISTENT: ICD-10-CM

## 2025-01-29 RX ORDER — CEPHALEXIN 250 MG/1
1 CAPSULE ORAL 2 TIMES DAILY
Qty: 120 BLISTER | Refills: 0 | Status: SHIPPED | OUTPATIENT
Start: 2025-01-29

## 2025-01-31 ENCOUNTER — OFFICE VISIT (OUTPATIENT)
Dept: DENTISTRY | Facility: CLINIC | Age: 70
End: 2025-01-31

## 2025-01-31 DIAGNOSIS — Z97.2: Primary | ICD-10-CM

## 2025-01-31 PROCEDURE — D0270 BITEWING - SINGLE RADIOGRAPHIC IMAGE: HCPCS

## 2025-01-31 PROCEDURE — D0220 INTRAORAL - PERIAPICAL FIRST RADIOGRAPHIC IMAGE: HCPCS

## 2025-01-31 PROCEDURE — D9110 PALLIATIVE (EMERGENCY) TREATMENT OF DENTAL PAIN - MINOR PROCEDURE: HCPCS

## 2025-01-31 NOTE — PROGRESS NOTES
Evaluation of Loose #12 Implant [place]  Pt presents for evaluation of loose implant crown #12.   PMH reviewed, no changes.     Pt presents with implant crown/abutment/screw out of mouth, reporting it came out 1 day ago. Implant access is covered with gingiva.     Removed soft tissue from implant access with laser, safety glasses worn by all people in the room. Accessed #12 crown extraorally to find screw channel. #12 crown was stated to be cement retained with IRM, no presence of roderick tape between access and screw. Removed excess white material (possibly cement) with sonic .     Took Pa to check seating of implant.   Unable to torque implant to 30 Ncm it keeps spinning. Able to hand tighten implant. Suspect that there is still cement in the screw and unable to fully engage screw to hand torque. Explained to patient that best plan is to remake implant crown #12, pt will monitor how long implant will last until it is loose again.   Occlusion checked and ideal occlusal markings in CO and protrusive movements. Verified contacts are tight and snap floss.     Screw access covered with roderick, light cured, and access channel covered with A2 composite.     Pt satisfied with function and esthetics, left ambulatory and satisfied.     NV: Periodic/prophy  NV: Remake #12 implant crown if/when patient is ready.

## 2025-02-10 ENCOUNTER — OFFICE VISIT (OUTPATIENT)
Dept: CARDIOLOGY CLINIC | Facility: CLINIC | Age: 70
End: 2025-02-10
Payer: COMMERCIAL

## 2025-02-10 VITALS
WEIGHT: 198.4 LBS | DIASTOLIC BLOOD PRESSURE: 68 MMHG | SYSTOLIC BLOOD PRESSURE: 120 MMHG | BODY MASS INDEX: 36.51 KG/M2 | HEART RATE: 58 BPM | HEIGHT: 62 IN

## 2025-02-10 DIAGNOSIS — I28.8 ENLARGED PULMONARY ARTERY (HCC): ICD-10-CM

## 2025-02-10 DIAGNOSIS — E78.2 MIXED HYPERLIPIDEMIA: ICD-10-CM

## 2025-02-10 DIAGNOSIS — I10 PRIMARY HYPERTENSION: Primary | ICD-10-CM

## 2025-02-10 PROBLEM — I51.89 DIASTOLIC DYSFUNCTION WITHOUT HEART FAILURE: Status: RESOLVED | Noted: 2025-02-10 | Resolved: 2025-02-10

## 2025-02-10 PROBLEM — I51.89 DIASTOLIC DYSFUNCTION WITHOUT HEART FAILURE: Status: ACTIVE | Noted: 2025-02-10

## 2025-02-10 PROCEDURE — 99214 OFFICE O/P EST MOD 30 MIN: CPT | Performed by: INTERNAL MEDICINE

## 2025-02-10 NOTE — PATIENT INSTRUCTIONS
Assessment & Plan  Primary hypertension  Blood pressure is well-controlled on current therapy with olmesartan 40 mg and HCTZ 25 mg daily.  Clinically she has had no recent symptoms of heart failure.  Her weight has improved.  Physical examination is overall unremarkable.  Renal function is normal and stable.  Advising to continue current medications.       Mixed hyperlipidemia  Is on low-dose rosuvastatin therapy.  Lipids are reasonably well-controlled.  LFTs are normal.  Advising to continue current medications.       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HCC)  Was noted to have enlarged pulmonary artery on imaging study in the past.  Echocardiogram in 2022 did not demonstrate significant abnormality and there was no evidence of pulmonic valve regurgitation or pulmonary hypertension.  Advising to continue antihypertensive medications.

## 2025-02-10 NOTE — ASSESSMENT & PLAN NOTE
Was noted to have enlarged pulmonary artery on imaging study in the past.  Echocardiogram in 2022 did not demonstrate significant abnormality and there was no evidence of pulmonic valve regurgitation or pulmonary hypertension.  Advising to continue antihypertensive medications.

## 2025-02-10 NOTE — ASSESSMENT & PLAN NOTE
Blood pressure is well-controlled on current therapy with olmesartan 40 mg and HCTZ 25 mg daily.  Clinically she has had no recent symptoms of heart failure.  Her weight has improved.  Physical examination is overall unremarkable.  Renal function is normal and stable.  Advising to continue current medications.

## 2025-02-10 NOTE — ASSESSMENT & PLAN NOTE
Is on low-dose rosuvastatin therapy.  Lipids are reasonably well-controlled.  LFTs are normal.  Advising to continue current medications.

## 2025-02-10 NOTE — PROGRESS NOTES
Name: Wanda Schaffer      : 1955      MRN: [id number]  Encounter Provider: Ty Anderson MD  Encounter Date: 2/10/2025   Encounter department: [facility] [place]    DIAGNOSES:  1. Diastolic dysfunction without diastolic CHF.  2. Mixed dyslipidemia  3. Asthma  4. Hypothyroidism  5. Benign essential hypertension  6.  Class II obesity  7.  Suspected sleep apnea  8.  Mild interstitial lung disease    ECHOCARDIOGRAM 2022: Normal left ventricle size, mildly increased work stress, normal left ventricle systolic function, normal diastolic function, normal right ventricle size and function, normal left and right atrial size, normal aortic valve without stenosis or regurgitation, normal mitral valve, trace mitral valve regurgitation, trace tricuspid valve regurgitation, no obvious pulmonary hypertension, no pericardial effusion, mildly dilated ascending aorta measuring 3.4 cm, normal for vena cava and appropriate respirophasic changes in diameter    ECHOCARDIOGRAM May 15, 2019:  Normal left ventricular size, mild concentric LVH, normal left ventricular systolic function, EF of around 63%, normal diastolic function, normal left atrial pressure, aortic valve sclerosis, no stenosis or regurgitation, no mitral valve regurgitation, trace tricuspid valve regurgitation, no obvious pulmonary hypertension and no pericardial effusion.    ECHOCARDIOGRAM 2016 showed mildly prominent basal interventricular septum, normal left ventricular systolic function, EF around 60-65%, mild aortic valve sclerosis without stenosis, trace to mild mitral regurgitation, no pulmonary hypertension.    Assessment & Plan  Primary hypertension  Blood pressure is well-controlled on current therapy with olmesartan 40 mg and HCTZ 25 mg daily.  Clinically she has had no recent symptoms of heart failure.  Her weight has improved.  Physical examination is overall unremarkable.  Renal function is normal and stable.  Advising to  continue current medications.       Mixed hyperlipidemia  Is on low-dose rosuvastatin therapy.  Lipids are reasonably well-controlled.  LFTs are normal.  Advising to continue current medications.       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HCC)  Was noted to have enlarged pulmonary artery on imaging study in the past.  Echocardiogram in 2022 did not demonstrate significant abnormality and there was no evidence of pulmonic valve regurgitation or pulmonary hypertension.  Advising to continue antihypertensive medications.           History of Present Illness   HPI  Wanda Schaffer is a 70 y.o. female who presents hypertension, history of enlarged pulmonary artery, diabetes mellitus, dyslipidemia, obesity and other comorbidities.  She was last seen by me in April 2024.    History obtained from: patient    She will assess.  From a symptom perspective she has been feeling.  Denies any unusual chest pain or shortness of breath with activity or palpitation or presyncope/syncope.  Reports that she was dealing with some abnormal findings in the right breast but it was benign.  Reports work-related stress as she works with [facility] the [facility].  Reports that she is retiring due to concerns for her health.  Continues to occasionally experience a sharp pain in the left upper chest region.    Current cardiac medications: HCTZ 25 mg daily, olmesartan 40 mg daily, rosuvastatin 5 mg daily     Blood work 8/13/2024: Sodium 140 potassium 3.8 chloride 104 bicarb 28 BUN 14 creatinine 0.69 GFR 89  Normal liver function test  TSH 1.797 on 2/19/2024  Lipid profile: Cholesterol 151 triglyceride 99 HDL 47 calculated LDL 84 on 2/19/2024  Hemoglobin A1c 6.0 on 6/7/2024    The 10-year ASCVD risk score (Fay OROZCO, et al., 2019) is: 17.6%    Values used to calculate the score:      Age: 70 years      Sex: Female      Is Non- : Yes      Diabetic: Yes      Tobacco smoker: No      Systolic Blood Pressure: 120 mmHg      Is BP  treated: Yes      HDL Cholesterol: 47 mg/dL      Total Cholesterol: 151 mg/dL  (Low risk: Less than <5%; borderline risk: >=5% to <7.5%; intermediate risk: >=7.5% to <20%; high risk:>=20%)  Patient's ASCVD risk category: Intermediate    Major cardiac risk factors: None (Tobacco use, Hypertension, Family history of CHD, Primary severe hypercholesterolemia, DM, multiple major and risk enhancing factors)     Any cardiac clinical risk enhancers from available data: No family history of premature CAD or sudden cardiac death.  (Family history of premature CAD, patient's history of chronic kidney disease, primary hypercholesterolemia, metabolic syndrome, abnormal CASSIE, inflammatory condition such as RA-HIV-psoriasis, RA-HIV-Psoriasis,, pregnancy related complications such as preeclampsia or premature delivery, early menopause, high risk ethnicity such as [place] , social deprivation, physical inactivity, nonalcoholic fatty liver disease psychosocial stress, major psychiatric illness, atrial fibrillation, left ventricular hypertrophy, obstructive sleep apnea, nonalcoholic fatty liver disease).    ECG: No results found for this visit on 02/10/25.    REVIEW OF SYSTEMS   Positive for: As noted above in HPI  Negative for: All remaining as reviewed below and in HPI.    SYSTEM SYMPTOMS REVIEWED:  General--weight change, fever, night sweats  Respiratory--cough, wheezing, shortness of breath, sputum production  Cardiovascular--chest pain, syncope, dyspnea on exertion, edema, decline in exercise tolerance, claudication   Gastrointestinal--persistent vomiting, diarrhea, abdominal distention, blood in stool   Muscular or skeletal--joint pain or swelling   Neurologic--headaches, syncope, abnormal movement  Hematologic--history of easy bruising and bleeding   Endocrine--thyroid enlargement, heat or cold intolerance, polyuria   Psychiatric--anxiety, depression     CURRENT MEDICATIONS LIST     Current Outpatient Medications:      "Advair Diskus 100-50 MCG/ACT inhaler, Inhale 1 puff 2 (two) times a day Rinse mouth after use., Disp: 120 blister, Rfl: 0    hydroCHLOROthiazide 25 mg tablet, Take 1 tablet (25 mg total) by mouth daily, Disp: 90 tablet, Rfl: 0    levothyroxine 50 mcg tablet, Take 1 tablet (50 mcg total) by mouth daily, Disp: 90 tablet, Rfl: 3    Multiple Vitamins-Minerals (MULTIVITAMIN ADULT PO), 1 tablet every 24 hours, Disp: , Rfl:     olmesartan (BENICAR) 40 mg tablet, Take 1 tablet (40 mg total) by mouth daily, Disp: 90 tablet, Rfl: 3    rosuvastatin (CRESTOR) 5 mg tablet, Take 1 tablet (5 mg total) by mouth daily, Disp: 90 tablet, Rfl: 3    Tirzepatide 7.5 MG/0.5ML SOAJ, Inject 7.5 mg under the skin once a week, Disp: 2 mL, Rfl: 5    glucose blood test strip, Checks blood glucose 3 times a day., Disp: 100 each, Rfl: 3    Lancets (OneTouch Delica Plus Orlxen60P) MISC, Test 1 time a day, Disp: 100 each, Rfl: 0       ALLERGIES     Allergies   Allergen Reactions    Seasonal Ic [Octacosanol] Allergic Rhinitis       *-*-*-*-*-*-*-*-*-*-*-*-*-*-*-*-*-*-*-*-*-*-*-*-*-*-*-*-*-*-*-*-*-*-*-*-*-*-*-*-*-*-*-*-*-*-*-*-*-*-*-*-*-*-    Review of Systems   Constitutional:  Positive for fatigue.   HENT: Negative.     Respiratory: Negative.     Cardiovascular:         Sharp chest pain.   Gastrointestinal: Negative.    Genitourinary: Negative.    Skin: Negative.    Hematological: Negative.    Psychiatric/Behavioral: Negative.            Objective   /68 (BP Location: Right arm, Patient Position: Sitting, Cuff Size: Large)   Pulse 58   Ht 5' 2\" (1.575 m)   Wt 90 kg (198 lb 6.4 oz)   BMI 36.29 kg/m²      Physical Exam  Vitals reviewed.   Constitutional:       Appearance: Normal appearance. She is normal weight.   HENT:      Mouth/Throat:      Mouth: Mucous membranes are moist.   Cardiovascular:      Rate and Rhythm: Normal rate and regular rhythm.      Pulses: Normal pulses.      Heart sounds: No murmur heard.  Pulmonary:      Breath " sounds: Normal breath sounds.   Abdominal:      Palpations: Abdomen is soft.   Musculoskeletal:      Right lower leg: No edema.      Left lower leg: No edema.   Skin:     General: Skin is dry.   Neurological:      Mental Status: She is oriented to person, place, and time.   Psychiatric:         Mood and Affect: Mood normal.         Behavior: Behavior normal.

## 2025-02-12 ENCOUNTER — TELEPHONE (OUTPATIENT)
Dept: DENTISTRY | Facility: CLINIC | Age: 70
End: 2025-02-12

## 2025-02-12 ENCOUNTER — OFFICE VISIT (OUTPATIENT)
Dept: DENTISTRY | Facility: CLINIC | Age: 70
End: 2025-02-12

## 2025-02-12 VITALS — SYSTOLIC BLOOD PRESSURE: 112 MMHG | DIASTOLIC BLOOD PRESSURE: 73 MMHG | HEART RATE: 84 BPM

## 2025-02-12 DIAGNOSIS — K03.6 DENTAL CALCULUS: ICD-10-CM

## 2025-02-12 DIAGNOSIS — K03.6 ACCRETIONS ON TEETH: ICD-10-CM

## 2025-02-12 DIAGNOSIS — Z01.20 ENCOUNTER FOR DENTAL EXAMINATION: Primary | ICD-10-CM

## 2025-02-12 DIAGNOSIS — K02.9 DENTAL CARIES: ICD-10-CM

## 2025-02-12 PROCEDURE — D0120 PERIODIC ORAL EVALUATION - ESTABLISHED PATIENT: HCPCS

## 2025-02-12 PROCEDURE — D1110 PROPHYLAXIS - ADULT: HCPCS

## 2025-02-12 NOTE — PROGRESS NOTES
PERIODIC EXAM, ADULT PROPHY , (no xrays due )   REVIEWED MED HX: meds, allergies, health changes reviewed in [facility]. All consents signed.  CHIEF CONCERN: *states she was told she needs new implant CR # 12  ( see notes from [facility] 1/2025) and will likely need to have # 29 extracted and  implant placed  # 29    she had endo performed outside endo office a few months ago and was told she likely has a fracture in the tooth    She gets discomfort when she bites on tooth a certain way  Patient not ready to proceed yet as she wants to have # 12 issues resolved first    Implant # 12 was made a [place] approx 2012 per patient    She was at [place] last month to have CR re cemented    there was an issue with the older implant CR and she was advised to consider new implant crown    Patient leaning toward this advice, is interested in the cost.  We will pre auth for new implant crown    PAIN SCALE:  3  ASA CLASS:  II  PLAQUE:  mild  CALCULUS:  light  BLEEDING:   light  STAIN :   none      PERIO:  stage 2 grade B    Hygiene Procedures:  Scaled, Polished, Flossed    Oral Hygiene Instruction: Brushing Minimum 2x daily for 2 minutes, daily flossing, Recommended soft toothbrush only, and Reviewed dietary precautions    Dispensed: Toothbrush, Toothpaste, Floss and Flossers    Visual and Tactile Intraoral/ Extraoral evaluation: Oral and Oropharyngeal cancer evaluation. No findings     Dr. Monroy   Reviewed with patient clinical and radiographic findings and patient verbalized understanding. All questions and concerns addressed.     REFERRALS:  will need to return to [place] for implant work     Advised her to establish SFS info since she is planning on retiring    CARIES FINDINGS: no decay noted       TREATMENT  PLAN :   NV:   discuss new implant crown # 12 [place]  pre auth sent    Next Recall: 6 month recall   up date bwx and pax # 29    Last BWX:   4/2024    1 bwx 1/2025  Last Panorex/ FMX : 1/2022

## 2025-02-12 NOTE — TELEPHONE ENCOUNTER
Pt called to inquire about a text reminder she got about a balance she has due. Upon checking her chart she was seen in [place] and it seemed to have been for implant appt.  The charges were a bit confusing for me so I told pt I would speak to [facility] valadez and we would reach out once bill was reviewed. Called Batsheva and spoke to her. Batsheva reviewed stated she will follow up with provider and contact pt. -ROMIE

## 2025-02-12 NOTE — TELEPHONE ENCOUNTER
A voice message was left regarding her bill. An email was sent to Dr. SILVA and Dr. Boswell for review.

## 2025-02-13 ENCOUNTER — APPOINTMENT (OUTPATIENT)
Dept: LAB | Facility: HOSPITAL | Age: 70
End: 2025-02-13
Payer: COMMERCIAL

## 2025-02-13 ENCOUNTER — RESULTS FOLLOW-UP (OUTPATIENT)
Dept: FAMILY MEDICINE CLINIC | Facility: CLINIC | Age: 70
End: 2025-02-13

## 2025-02-13 DIAGNOSIS — E11.65 TYPE 2 DIABETES MELLITUS WITH HYPERGLYCEMIA, WITHOUT LONG-TERM CURRENT USE OF INSULIN (HCC): ICD-10-CM

## 2025-02-13 DIAGNOSIS — Z00.01 ENCOUNTER FOR GENERAL ADULT MEDICAL EXAMINATION WITH ABNORMAL FINDINGS: ICD-10-CM

## 2025-02-13 DIAGNOSIS — Z87.898 HISTORY OF PREDIABETES: ICD-10-CM

## 2025-02-13 DIAGNOSIS — E03.9 HYPOTHYROIDISM (ACQUIRED): ICD-10-CM

## 2025-02-13 LAB
BASOPHILS # BLD AUTO: 0.04 THOUSANDS/ÂΜL (ref 0–0.1)
BASOPHILS NFR BLD AUTO: 1 % (ref 0–1)
CHOLEST SERPL-MCNC: 180 MG/DL (ref ?–200)
CREAT UR-MCNC: 209.1 MG/DL
EOSINOPHIL # BLD AUTO: 0.17 THOUSAND/ÂΜL (ref 0–0.61)
EOSINOPHIL NFR BLD AUTO: 3 % (ref 0–6)
ERYTHROCYTE [DISTWIDTH] IN BLOOD BY AUTOMATED COUNT: 14.6 % (ref 11.6–15.1)
HCT VFR BLD AUTO: 43.1 % (ref 34.8–46.1)
HDLC SERPL-MCNC: 65 MG/DL
HGB BLD-MCNC: 14.6 G/DL (ref 11.5–15.4)
IMM GRANULOCYTES # BLD AUTO: 0.02 THOUSAND/UL (ref 0–0.2)
IMM GRANULOCYTES NFR BLD AUTO: 0 % (ref 0–2)
LDLC SERPL CALC-MCNC: 98 MG/DL (ref 0–100)
LYMPHOCYTES # BLD AUTO: 2.84 THOUSANDS/ÂΜL (ref 0.6–4.47)
LYMPHOCYTES NFR BLD AUTO: 41 % (ref 14–44)
MCH RBC QN AUTO: 27.1 PG (ref 26.8–34.3)
MCHC RBC AUTO-ENTMCNC: 33.9 G/DL (ref 31.4–37.4)
MCV RBC AUTO: 80 FL (ref 82–98)
MICROALBUMIN UR-MCNC: <7 MG/L
MONOCYTES # BLD AUTO: 0.69 THOUSAND/ÂΜL (ref 0.17–1.22)
MONOCYTES NFR BLD AUTO: 10 % (ref 4–12)
NEUTROPHILS # BLD AUTO: 3.13 THOUSANDS/ÂΜL (ref 1.85–7.62)
NEUTS SEG NFR BLD AUTO: 45 % (ref 43–75)
NRBC BLD AUTO-RTO: 0 /100 WBCS
PLATELET # BLD AUTO: 228 THOUSANDS/UL (ref 149–390)
PMV BLD AUTO: 11.3 FL (ref 8.9–12.7)
RBC # BLD AUTO: 5.39 MILLION/UL (ref 3.81–5.12)
TRIGL SERPL-MCNC: 83 MG/DL (ref ?–150)
TSH SERPL DL<=0.05 MIU/L-ACNC: 2.2 UIU/ML (ref 0.45–4.5)
WBC # BLD AUTO: 6.89 THOUSAND/UL (ref 4.31–10.16)

## 2025-02-13 PROCEDURE — 82043 UR ALBUMIN QUANTITATIVE: CPT

## 2025-02-13 PROCEDURE — 84443 ASSAY THYROID STIM HORMONE: CPT

## 2025-02-13 PROCEDURE — 85025 COMPLETE CBC W/AUTO DIFF WBC: CPT

## 2025-02-13 PROCEDURE — 82570 ASSAY OF URINE CREATININE: CPT

## 2025-02-13 PROCEDURE — 83036 HEMOGLOBIN GLYCOSYLATED A1C: CPT

## 2025-02-13 PROCEDURE — 80061 LIPID PANEL: CPT

## 2025-02-13 PROCEDURE — 36415 COLL VENOUS BLD VENIPUNCTURE: CPT

## 2025-02-13 NOTE — TELEPHONE ENCOUNTER
Pt came to the office request refills.  Pt states the last day working is today. Pt is retiring.    Pantoprazole 40 mg  Albuterol (nebulizer solution)  Glucose blood test strip  Lancets    Please advise.

## 2025-02-14 ENCOUNTER — TELEPHONE (OUTPATIENT)
Dept: FAMILY MEDICINE CLINIC | Facility: CLINIC | Age: 70
End: 2025-02-14

## 2025-02-14 DIAGNOSIS — I10 HYPERTENSION, UNSPECIFIED TYPE: ICD-10-CM

## 2025-02-14 LAB
EST. AVERAGE GLUCOSE BLD GHB EST-MCNC: 128 MG/DL
HBA1C MFR BLD: 6.1 %

## 2025-02-14 RX ORDER — HYDROCHLOROTHIAZIDE 25 MG/1
25 TABLET ORAL DAILY
Qty: 90 TABLET | Refills: 1 | Status: SHIPPED | OUTPATIENT
Start: 2025-02-14

## 2025-02-14 RX ORDER — LANCETS 30 GAUGE
EACH MISCELLANEOUS
Qty: 100 EACH | Refills: 0 | Status: SHIPPED | OUTPATIENT
Start: 2025-02-14

## 2025-02-14 NOTE — TELEPHONE ENCOUNTER
Pt came to the office request meds.     Pantoprazole 40 mg  albuterol (2.5 mg/3 mL) 0.083 % nebulizer solution     Please

## 2025-02-16 DIAGNOSIS — R10.13 EPIGASTRIC PAIN: ICD-10-CM

## 2025-02-16 DIAGNOSIS — J45.909 MODERATE ASTHMA, UNSPECIFIED WHETHER COMPLICATED, UNSPECIFIED WHETHER PERSISTENT: Primary | ICD-10-CM

## 2025-02-16 RX ORDER — PANTOPRAZOLE SODIUM 20 MG/1
20 TABLET, DELAYED RELEASE ORAL
Qty: 30 TABLET | Refills: 5 | Status: SHIPPED | OUTPATIENT
Start: 2025-02-16 | End: 2025-08-15

## 2025-02-16 RX ORDER — ALBUTEROL SULFATE 0.83 MG/ML
2.5 SOLUTION RESPIRATORY (INHALATION) EVERY 6 HOURS PRN
Qty: 100 ML | Refills: 5 | Status: SHIPPED | OUTPATIENT
Start: 2025-02-16

## 2025-02-20 ENCOUNTER — ANNUAL EXAM (OUTPATIENT)
Dept: OBGYN CLINIC | Facility: CLINIC | Age: 70
End: 2025-02-20

## 2025-02-20 VITALS — BODY MASS INDEX: 36.95 KG/M2 | DIASTOLIC BLOOD PRESSURE: 84 MMHG | WEIGHT: 202 LBS | SYSTOLIC BLOOD PRESSURE: 120 MMHG

## 2025-02-20 DIAGNOSIS — Z01.419 ROUTINE GYNECOLOGICAL EXAMINATION: Primary | ICD-10-CM

## 2025-02-20 DIAGNOSIS — Z12.11 COLON CANCER SCREENING: ICD-10-CM

## 2025-02-20 DIAGNOSIS — N32.81 OVERACTIVE BLADDER: ICD-10-CM

## 2025-02-20 DIAGNOSIS — Z12.31 ENCOUNTER FOR SCREENING MAMMOGRAM FOR MALIGNANT NEOPLASM OF BREAST: ICD-10-CM

## 2025-02-20 PROCEDURE — S0612 ANNUAL GYNECOLOGICAL EXAMINA: HCPCS | Performed by: OBSTETRICS & GYNECOLOGY

## 2025-02-20 RX ORDER — TOLTERODINE 4 MG/1
4 CAPSULE, EXTENDED RELEASE ORAL DAILY
Qty: 30 CAPSULE | Refills: 11 | Status: SHIPPED | OUTPATIENT
Start: 2025-02-20

## 2025-02-20 NOTE — PROGRESS NOTES
ANNUAL GYNECOLOGICAL EXAMINATION    Wanda Schaffer is a 70 y.o. female who presents today for annual GYN exam.   She reports no history of abnormal pap smears in her past.  Her last mammogram was performed 24 and result was negative.  She had colon cancer screening performed 2020.  She had HIV screening performed 20 and it was negative.  No LMP recorded. Patient has had a hysterectomy.  Her general medical history has been reviewed and she reports it as follows:    Past Medical History:   Diagnosis Date    Asthma     Colon polyp     Diabetes mellitus (HCC)     Disease of thyroid gland     GERD (gastroesophageal reflux disease)     Hyperlipidemia     Hypertension     Obesity     Pre-diabetes     Seasonal allergies     Thyroid disease     Tinnitus      Past Surgical History:   Procedure Laterality Date     SECTION      Triplets    COLONOSCOPY      COLONOSCOPY W/ POLYPECTOMY  2020    repeat     HYSTERECTOMY      age 49    REDUCTION MAMMAPLASTY       OB History          3    Para   3    Term   3            AB        Living             SAB        IAB        Ectopic        Multiple        Live Births   4               Social History     Tobacco Use    Smoking status: Former     Current packs/day: 0.00     Average packs/day: 0.5 packs/day for 38.2 years (19.1 ttl pk-yrs)     Types: Cigarettes     Start date:      Quit date: 3/18/2010     Years since quittin.9     Passive exposure: Past    Smokeless tobacco: Never   Vaping Use    Vaping status: Never Used   Substance Use Topics    Alcohol use: Yes     Alcohol/week: 4.0 standard drinks of alcohol     Types: 4 Glasses of wine per week     Comment: social    Drug use: No     Social History     Substance and Sexual Activity   Sexual Activity Not Currently    Partners: Male    Birth control/protection: None, Female Sterilization     Cancer-related family history includes Breast cancer (age of onset: 58) in her maternal  aunt.    Current Outpatient Medications   Medication Instructions    Advair Diskus 100-50 MCG/ACT inhaler 1 puff, Inhalation, 2 times daily, Rinse mouth after use    albuterol 2.5 mg, Nebulization, Every 6 hours PRN    glucose blood test strip Checks blood glucose 3 times a day.    hydroCHLOROthiazide 25 mg, Oral, Daily    Lancets (OneTouch Delica Plus [id number]) MISC Test 1 time a day    levothyroxine 50 mcg, Oral, Daily    Multiple Vitamins-Minerals (MULTIVITAMIN ADULT PO) 1 tablet, Every 24 hours    olmesartan (BENICAR) 40 mg, Oral, Daily    pantoprazole (PROTONIX) 20 mg, Oral, Daily before breakfast    rosuvastatin (CRESTOR) 5 mg, Oral, Daily    Tirzepatide 7.5 mg, Subcutaneous, Weekly       Review of Systems:  Review of Systems   Genitourinary:  Negative for pelvic pain, vaginal bleeding and vaginal discharge.        Urinary incontinence   All other systems reviewed and are negative.      Physical Exam:  /84 (BP Location: Left arm, Patient Position: Sitting, Cuff Size: Standard)   Wt 91.6 kg (202 lb)   BMI 36.95 kg/m²   Physical Exam  Constitutional:       Appearance: Normal appearance.   Genitourinary:      Urethral meatus normal.      No lesions in the vagina.      Right Labia: No rash, tenderness, lesions or skin changes.     Left Labia: No tenderness, lesions, skin changes or rash.     No inguinal adenopathy present in the right or left side.     Vaginal cuff intact.     No vaginal discharge.      Anterior vaginal prolapse present.     Moderate vaginal atrophy present.       Right Adnexa: not tender, not full and no mass present.     Left Adnexa: not tender, not full and no mass present.     Cervix is absent.      Uterus is absent.      No urethral tenderness or mass present.      Bladder is not tender and urgency on palpation not present.       Bladder exam comments: cytstocele.   Breasts:     Breasts are soft.     Right: No swelling, inverted nipple, mass, nipple discharge, skin change or  tenderness.      Left: No swelling, inverted nipple, mass, nipple discharge, skin change or tenderness.   HENT:      Head: Normocephalic and atraumatic.   Cardiovascular:      Rate and Rhythm: Normal rate and regular rhythm.   Pulmonary:      Effort: Pulmonary effort is normal.      Breath sounds: Normal breath sounds.   Abdominal:      General: Bowel sounds are normal.      Palpations: Abdomen is soft.      Hernia: There is no hernia in the left inguinal area or right inguinal area.   Musculoskeletal:         General: Normal range of motion.      Cervical back: Normal range of motion and neck supple.   Lymphadenopathy:      Upper Body:      Right upper body: No supraclavicular or axillary adenopathy.      Left upper body: No supraclavicular or axillary adenopathy.      Lower Body: No right inguinal adenopathy. No left inguinal adenopathy.   Neurological:      Mental Status: She is alert and oriented to person, place, and time.   Skin:     General: Skin is warm and dry.   Psychiatric:         Mood and Affect: Mood normal.   Vitals and nursing note reviewed.           Assessment/Plan:   1. Normal well-woman GYN exam.    2. STD screening:  Patient declines.   3. Breast cancer screening:  Normal breast exam.  Order placed for bilateral screening mammogram.  Reviewed breast self-awareness.   4. Colon cancer screening:  Order placed for consultation with Gastroenterology for consultation to discuss screening.   5. BMI Counseling: Body mass index is 36.95 kg/m². Discussed the patient's BMI with her. The BMI is above normal. Nutrition recommendations include decreasing overall calorie intake, decreasing soda and/or juice intake, moderation in carbohydrate intake, increasing intake of lean protein, reducing intake of saturated fat and trans fat, and reducing intake of cholesterol.   6. Return to office 1yr/prn.   7. Overactive bladder: Detrol La escribed    Reviewed with patient that test results are available in [facility]  immediately, but that they will not necessarily be reviewed by me immediately.  Explained that I will review results at my earliest opportunity and contact patient appropriately.

## 2025-02-21 NOTE — RESULT ENCOUNTER NOTE
Dear Wanda, hemoglobin A1c is stable that means good control of diabetes.  Hemoglobin A1c is 6.1%.  8 months ago was 6.0%.  Please continue same treatment.

## 2025-03-19 ENCOUNTER — TELEPHONE (OUTPATIENT)
Facility: HOSPITAL | Age: 70
End: 2025-03-19

## 2025-03-19 NOTE — TELEPHONE ENCOUNTER
Telephone Call    [] Called Patient regarding [facility] [facility]; Patient enrolled to [facility] [facility].    [] Called Patient regarding [facility] [facility]; Patient answered call and wants to enroll; Asked for a call back.    [] Called Patient regarding [employer] [facility]; Patient answered call; Declined to enroll in program.    [] Called Patient regarding [employer] [facility]; Patient did not ; Left voicemail with my name and direct phone number.     [] Called Patient regarding [employer] [facility]; Patient did not ; Unable to leave voicemail.    [] Called Patient regarding [employer] [facility]; Phone number is invalid    Attempts (Max 3): [x]1   []2   []3        Additional Notes:  Reached out to patient, at this time she is waiting for Medicare approval. I was not able to enroll her in [employer], will [place]  to mammo date to verify if eligible.

## 2025-05-14 ENCOUNTER — OFFICE VISIT (OUTPATIENT)
Dept: FAMILY MEDICINE CLINIC | Facility: CLINIC | Age: 70
End: 2025-05-14

## 2025-05-14 VITALS
DIASTOLIC BLOOD PRESSURE: 70 MMHG | RESPIRATION RATE: 16 BRPM | TEMPERATURE: 97.9 F | WEIGHT: 205 LBS | HEART RATE: 78 BPM | OXYGEN SATURATION: 98 % | SYSTOLIC BLOOD PRESSURE: 136 MMHG | BODY MASS INDEX: 37.73 KG/M2 | HEIGHT: 62 IN

## 2025-05-14 DIAGNOSIS — I10 PRIMARY HYPERTENSION: ICD-10-CM

## 2025-05-14 DIAGNOSIS — M25.561 ACUTE PAIN OF RIGHT KNEE: ICD-10-CM

## 2025-05-14 DIAGNOSIS — E11.65 TYPE 2 DIABETES MELLITUS WITH HYPERGLYCEMIA, WITHOUT LONG-TERM CURRENT USE OF INSULIN (HCC): Primary | ICD-10-CM

## 2025-05-14 PROCEDURE — 99214 OFFICE O/P EST MOD 30 MIN: CPT | Performed by: FAMILY MEDICINE

## 2025-05-14 NOTE — ASSESSMENT & PLAN NOTE
Lab Results   Component Value Date    HGBA1C 6.1 (H) 02/13/2025   Diabetes mellitus.  Last hemoglobin A1c on 02/13/2025 was 6.1%.  Treatment plan: Currently taking Mounjaro for diabetes management. Sample of Mounjaro 2.5 mg provided for 2 weeks before resuming regular dose to avoid nausea.  Clinical decision making: Monitor for adverse effects or changes in blood glucose levels.        Orders:    Comprehensive metabolic panel; Future

## 2025-05-14 NOTE — PROGRESS NOTES
Name: Wanda Schaffer      : 1955      MRN: [id number]  Encounter Provider: Ba Jason MD  Encounter Date: 2025   Encounter department: [facility]    Assessment & Plan  Type 2 diabetes mellitus with hyperglycemia, without long-term current use of insulin (HCC)    Lab Results   Component Value Date    HGBA1C 6.1 (H) 2025   Diabetes mellitus.  Last hemoglobin A1c on 2025 was 6.1%.  Treatment plan: Currently taking Mounjaro for diabetes management. Sample of Mounjaro 2.5 mg provided for 2 weeks before resuming regular dose to avoid nausea.  Clinical decision making: Monitor for adverse effects or changes in blood glucose levels.        Orders:    Comprehensive metabolic panel; Future    Acute pain of right knee  Right knee pain.  Significant pain and swelling in the right knee.  Treatment plan: Injected triamcinolone 40 mg and lidocaine 1 cm³.  Clinical decision making: Follow-up to monitor response.           Primary hypertension  Hypertension.  Blood pressure today is 136/70.  Treatment plan: Currently taking olmesartan 40 mg.  Clinical decision making: Monitor blood pressure regularly due to family history of stroke. No chest pain or shortness of breath.    Hyperlipidemia.  Treatment plan: Currently taking rosuvastatin.  Clinical decision making: Monitor lipid levels and adherence to medication. No side effects reported.           Assessment & Plan        Technique  All questions answered and agreement to proceed given after reviewing:  - Risks and Benefits: Discussed potential risks such as infection, bleeding, allergic reactions, and benefits including pain relief and reduced swelling.  - Alternative Options: Discussed oral medications, physical therapy, and surgical intervention.  - Side effects: Discussed possible side effects including temporary pain at the injection site, dizziness, and numbness.  - Consent: Verbal consent  "obtained.    Intra-Procedure:  - Time-Out: Confirmed patient's identity, procedure, and site.  - Site Preparation: Cleaned the right knee with antiseptic solution.  - Medication: Triamcinolone 40 mg administered.  - Dressing: Applied sterile bandage.    Post-Procedure:  - Tolerance Level: Patient tolerated the procedure well.  - Home Care Instructions: Monitor for signs of infection, avoid strenuous activities for 24 hours, apply ice if needed.           Subjective   History of Present Illness  The patient is a 70-year-old female who presents for follow-up of hypertension and diabetes. She reports feeling well overall.    She reports severe pain and swelling in her right knee. No chest pain or shortness of breath.    She has been managing her diabetes with Mounjaro 7.5 mg but has not taken it since 2025. Her last hemoglobin A1c was 6.1% on 2025.    Her current medication regimen includes rosuvastatin for hyperlipidemia and olmesartan 40 mg for hypertension.    FAMILY HISTORY  Mother  of stroke at age 56.  Father  at age 95.    Results  Labs   - Hemoglobin A1c: 2025, 6.1%   Allergies   Allergen Reactions    Seasonal Ic [Octacosanol] Allergic Rhinitis     Problem List[1]  Current Medications[2]    /70 (BP Location: Left arm, Patient Position: Sitting, Cuff Size: Standard)   Pulse 78   Temp 97.9 °F (36.6 °C) (Tympanic)   Resp 16   Ht 5' 2\" (1.575 m)   Wt 93 kg (205 lb)   SpO2 98%   BMI 37.49 kg/m²     Physical Exam  General Appearance: Normal.  HEENT: Within normal limits.  Respiratory: Lungs clear through the auscultation.  Cardiovascular: Heart has Normal rhythm without murmurs.  Gastrointestinal:   Genitourinary:   Lymphatic: No abnormal lymph nodes found.  Back, Musculoskeletal:   Extremities: Right knee swollen.  Skin: Warm and dry, no rash.  Neurological: Normal.                             [1]   Patient Active Problem List  Diagnosis    Hypertension    Mixed hyperlipidemia    " Hypothyroidism (acquired)    Asthma    Interstitial lung disease (HCC)    Abnormal CT scan of lung    Restrictive lung disease    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HCC)    Kidney congenitally absent, left    Osteopenia of lumbar spine    Type 2 diabetes mellitus with hyperglycemia, without long-term current use of insulin (HCC)    Latent tuberculosis treated 2005    Primary osteoarthritis of right knee    Overactive bladder   [2]   Current Outpatient Medications:     Advair Diskus 100-50 MCG/ACT inhaler, Inhale 1 puff 2 (two) times a day Rinse mouth after use., Disp: 120 blister, Rfl: 0    albuterol (2.5 mg/3 mL) 0.083 % nebulizer solution, Take 3 mL (2.5 mg total) by nebulization every 6 (six) hours as needed for wheezing or shortness of breath, Disp: 100 mL, Rfl: 5    glucose blood test strip, Checks blood glucose 3 times a day., Disp: 100 each, Rfl: 3    hydroCHLOROthiazide 25 mg tablet, Take 1 tablet (25 mg total) by mouth daily, Disp: 90 tablet, Rfl: 1    Lancets (OneTouch Delica Plus [id number]) MISC, Test 1 time a day, Disp: 100 each, Rfl: 0    levothyroxine 50 mcg tablet, Take 1 tablet (50 mcg total) by mouth daily, Disp: 90 tablet, Rfl: 3    Multiple Vitamins-Minerals (MULTIVITAMIN ADULT PO), 1 tablet every 24 hours, Disp: , Rfl:     olmesartan (BENICAR) 40 mg tablet, Take 1 tablet (40 mg total) by mouth daily, Disp: 90 tablet, Rfl: 3    pantoprazole (PROTONIX) 20 mg tablet, Take 1 tablet (20 mg total) by mouth daily before breakfast, Disp: 30 tablet, Rfl: 5    rosuvastatin (CRESTOR) 5 mg tablet, Take 1 tablet (5 mg total) by mouth daily, Disp: 90 tablet, Rfl: 3    Tirzepatide 7.5 MG/0.5ML SOAJ, Inject 7.5 mg under the skin once a week, Disp: 2 mL, Rfl: 5    tolterodine (DETROL LA) 4 mg 24 hr capsule, Take 1 capsule (4 mg total) by mouth daily, Disp: 30 capsule, Rfl: 11

## 2025-05-14 NOTE — ASSESSMENT & PLAN NOTE
Hypertension.  Blood pressure today is 136/70.  Treatment plan: Currently taking olmesartan 40 mg.  Clinical decision making: Monitor blood pressure regularly due to family history of stroke. No chest pain or shortness of breath.    Hyperlipidemia.  Treatment plan: Currently taking rosuvastatin.  Clinical decision making: Monitor lipid levels and adherence to medication. No side effects reported.